# Patient Record
Sex: FEMALE | Race: BLACK OR AFRICAN AMERICAN | NOT HISPANIC OR LATINO | Employment: FULL TIME | URBAN - METROPOLITAN AREA
[De-identification: names, ages, dates, MRNs, and addresses within clinical notes are randomized per-mention and may not be internally consistent; named-entity substitution may affect disease eponyms.]

---

## 2017-02-12 DIAGNOSIS — I10 ESSENTIAL HYPERTENSION: ICD-10-CM

## 2017-02-13 RX ORDER — OLMESARTAN MEDOXOMIL-HYDROCHLOROTHIAZIDE 40; 12.5 MG/1; MG/1
TABLET, FILM COATED ORAL
Qty: 30 TABLET | Refills: 2 | Status: SHIPPED | OUTPATIENT
Start: 2017-02-13 | End: 2017-03-05

## 2017-03-03 ENCOUNTER — PATIENT MESSAGE (OUTPATIENT)
Dept: FAMILY MEDICINE | Facility: CLINIC | Age: 67
End: 2017-03-03

## 2017-03-03 DIAGNOSIS — F32.2 MAJOR DEPRESSIVE DISORDER, SINGLE EPISODE, SEVERE WITHOUT PSYCHOTIC FEATURES: ICD-10-CM

## 2017-03-03 DIAGNOSIS — I10 ESSENTIAL HYPERTENSION: ICD-10-CM

## 2017-03-03 DIAGNOSIS — R42 VERTIGO: ICD-10-CM

## 2017-03-03 DIAGNOSIS — E78.5 HYPERLIPIDEMIA, UNSPECIFIED HYPERLIPIDEMIA TYPE: Primary | ICD-10-CM

## 2017-03-03 DIAGNOSIS — I10 ESSENTIAL HYPERTENSION: Primary | ICD-10-CM

## 2017-03-05 RX ORDER — ENALAPRIL MALEATE AND HYDROCHLOROTHIAZIDE 5; 12.5 MG/1; MG/1
1 TABLET ORAL DAILY
Qty: 90 TABLET | Refills: 3 | Status: SHIPPED | OUTPATIENT
Start: 2017-03-05 | End: 2017-03-06 | Stop reason: SDUPTHER

## 2017-03-06 RX ORDER — ESCITALOPRAM OXALATE 10 MG/1
10 TABLET ORAL DAILY
Qty: 90 TABLET | Refills: 0 | Status: SHIPPED | OUTPATIENT
Start: 2017-03-06 | End: 2017-03-07 | Stop reason: SDUPTHER

## 2017-03-06 RX ORDER — METOPROLOL SUCCINATE 25 MG/1
25 TABLET, EXTENDED RELEASE ORAL DAILY
Qty: 90 TABLET | Refills: 0 | Status: SHIPPED | OUTPATIENT
Start: 2017-03-06 | End: 2017-03-07 | Stop reason: SDUPTHER

## 2017-03-06 RX ORDER — ENALAPRIL MALEATE AND HYDROCHLOROTHIAZIDE 5; 12.5 MG/1; MG/1
1 TABLET ORAL DAILY
Qty: 90 TABLET | Refills: 0 | Status: SHIPPED | OUTPATIENT
Start: 2017-03-06 | End: 2017-06-01 | Stop reason: SDUPTHER

## 2017-03-06 RX ORDER — SIMVASTATIN 20 MG/1
20 TABLET, FILM COATED ORAL NIGHTLY
Qty: 90 TABLET | Refills: 0 | Status: SHIPPED | OUTPATIENT
Start: 2017-03-06 | End: 2017-03-07 | Stop reason: SDUPTHER

## 2017-03-06 RX ORDER — MECLIZINE HYDROCHLORIDE 25 MG/1
25 TABLET ORAL 3 TIMES DAILY PRN
Qty: 30 TABLET | Refills: 0 | Status: SHIPPED | OUTPATIENT
Start: 2017-03-06 | End: 2021-03-15 | Stop reason: SDUPTHER

## 2017-03-06 RX ORDER — AMLODIPINE BESYLATE 5 MG/1
5 TABLET ORAL DAILY
Qty: 90 TABLET | Refills: 0 | Status: SHIPPED | OUTPATIENT
Start: 2017-03-06 | End: 2017-06-01 | Stop reason: SDUPTHER

## 2017-03-07 ENCOUNTER — PATIENT MESSAGE (OUTPATIENT)
Dept: FAMILY MEDICINE | Facility: CLINIC | Age: 67
End: 2017-03-07

## 2017-03-07 DIAGNOSIS — I10 ESSENTIAL HYPERTENSION: ICD-10-CM

## 2017-03-07 DIAGNOSIS — E78.5 HYPERLIPIDEMIA, UNSPECIFIED HYPERLIPIDEMIA TYPE: ICD-10-CM

## 2017-03-07 DIAGNOSIS — F32.2 MAJOR DEPRESSIVE DISORDER, SINGLE EPISODE, SEVERE WITHOUT PSYCHOTIC FEATURES: ICD-10-CM

## 2017-03-07 RX ORDER — METOPROLOL SUCCINATE 25 MG/1
25 TABLET, EXTENDED RELEASE ORAL DAILY
Qty: 90 TABLET | Refills: 3 | Status: SHIPPED | OUTPATIENT
Start: 2017-03-07 | End: 2018-06-01 | Stop reason: SDUPTHER

## 2017-03-07 RX ORDER — SIMVASTATIN 20 MG/1
20 TABLET, FILM COATED ORAL NIGHTLY
Qty: 90 TABLET | Refills: 3 | Status: SHIPPED | OUTPATIENT
Start: 2017-03-07 | End: 2018-06-01 | Stop reason: SDUPTHER

## 2017-03-07 RX ORDER — ESCITALOPRAM OXALATE 10 MG/1
10 TABLET ORAL DAILY
Qty: 90 TABLET | Refills: 3 | Status: SHIPPED | OUTPATIENT
Start: 2017-03-07 | End: 2018-06-01 | Stop reason: SDUPTHER

## 2017-03-16 ENCOUNTER — OFFICE VISIT (OUTPATIENT)
Dept: FAMILY MEDICINE | Facility: CLINIC | Age: 67
End: 2017-03-16
Payer: MEDICARE

## 2017-03-16 VITALS
HEART RATE: 59 BPM | HEIGHT: 62 IN | DIASTOLIC BLOOD PRESSURE: 70 MMHG | WEIGHT: 178.81 LBS | TEMPERATURE: 98 F | BODY MASS INDEX: 32.91 KG/M2 | OXYGEN SATURATION: 99 % | SYSTOLIC BLOOD PRESSURE: 124 MMHG

## 2017-03-16 DIAGNOSIS — Z00.00 ANNUAL PHYSICAL EXAM: Primary | ICD-10-CM

## 2017-03-16 DIAGNOSIS — I10 ESSENTIAL HYPERTENSION: ICD-10-CM

## 2017-03-16 DIAGNOSIS — Z12.31 ENCOUNTER FOR SCREENING MAMMOGRAM FOR BREAST CANCER: ICD-10-CM

## 2017-03-16 DIAGNOSIS — Z12.11 COLON CANCER SCREENING: ICD-10-CM

## 2017-03-16 DIAGNOSIS — E78.5 HYPERLIPIDEMIA, UNSPECIFIED HYPERLIPIDEMIA TYPE: ICD-10-CM

## 2017-03-16 PROCEDURE — 3074F SYST BP LT 130 MM HG: CPT | Mod: S$GLB,,, | Performed by: FAMILY MEDICINE

## 2017-03-16 PROCEDURE — 99499 UNLISTED E&M SERVICE: CPT | Mod: S$GLB,,, | Performed by: FAMILY MEDICINE

## 2017-03-16 PROCEDURE — 99397 PER PM REEVAL EST PAT 65+ YR: CPT | Mod: S$GLB,,, | Performed by: FAMILY MEDICINE

## 2017-03-16 PROCEDURE — 99999 PR PBB SHADOW E&M-EST. PATIENT-LVL III: CPT | Mod: PBBFAC,,, | Performed by: FAMILY MEDICINE

## 2017-03-16 PROCEDURE — 3078F DIAST BP <80 MM HG: CPT | Mod: S$GLB,,, | Performed by: FAMILY MEDICINE

## 2017-03-16 NOTE — MR AVS SNAPSHOT
[facility]  [address]  [place] [address]  Phone: [phone]  Fax: [phone]                  Rosa Isela Pate   3/16/2017 8:40 AM   Office Visit    Description:  Female : 1950   Provider:  Clarissa Hodgson MD   Department:  [facility]           Reason for Visit     Annual Exam     Headache           Diagnoses this Visit        Comments    Annual physical exam    -  Primary     Essential hypertension         Hyperlipidemia, unspecified hyperlipidemia type         Colon cancer screening         Encounter for screening mammogram for breast cancer                To Do List           Goals (5 Years of Data)     None      [facility]     [facility] Care [facility] -  Assistance  Registered nurses in the [facility] provide clinical advisement, health education, appointment booking, and other advisory services.  Call for this free service at [phone].             Medications           Message regarding Medications     Verify the changes and/or additions to your medication regime listed below are the same as discussed with your clinician today.  If any of these changes or additions are incorrect, please notify your healthcare provider.             Verify that the below list of medications is an accurate representation of the medications you are currently taking.  If none reported, the list may be blank. If incorrect, please contact your healthcare provider. Carry this list with you in case of emergency.           Current Medications     amlodipine (NORVASC) 5 MG tablet Take 1 tablet (5 mg total) by mouth once daily.    escitalopram oxalate (LEXAPRO) 10 MG tablet Take 1 tablet (10 mg total) by mouth once daily.    meclizine (ANTIVERT) 25 mg tablet Take 1 tablet (25 mg total) by mouth 3 (three) times daily as needed for Dizziness.    metoprolol succinate (TOPROL-XL) 25 MG 24 hr tablet Take 1 tablet (25 mg total) by mouth once daily.  "   multivitamin with minerals tablet     simvastatin (ZOCOR) 20 MG tablet Take 1 tablet (20 mg total) by mouth every evening.    enalapril-hydrochlorothiazide 5-12.5 mg per tablet Take 1 tablet by mouth once daily.           Clinical Reference Information           Your Vitals Were     BP Pulse Temp Height Weight Last Period    124/70 59 97.8 °F (36.6 °C) (Oral) 5' 2" (1.575 m) 81.1 kg (178 lb 12.7 oz) (LMP Unknown)    SpO2 BMI             99% 32.7 kg/m2         Blood Pressure          Most Recent Value    BP  124/70      Allergies as of 3/16/2017     No Known Allergies      Immunizations Administered on Date of Encounter - 3/16/2017     None      Orders Placed During Today's Visit      Normal Orders This Visit    Case request GI: COLONOSCOPY     Future Labs/Procedures Expected by Expires    CBC auto differential  3/16/2017 3/16/2018    Comprehensive metabolic panel  3/16/2017 3/16/2018    Lipid panel  3/16/2017 3/16/2018    Mammo Digital Screening Bilat with CAD  3/16/2017 5/16/2018    TSH  3/16/2017 3/16/2018      Smoking Cessation     If you would like to quit smoking:   You may be eligible for free services if you are a [place] resident and started smoking cigarettes before September 1, 1988.  Call the Smoking Cessation [employer] ([facility]) toll free at (066) 478-8188 or (538) 888-5972.   Call 5-800-QUIT-NOW if you do not meet the above criteria.            Language Assistance Services     ATTENTION: Language assistance services are available, free of charge. Please call [phone].      ATENCIÓN: Si habla nellyañol, tiene a stephen disposición servicios gratuitos de asistencia lingüística. Llame al [phone].     [facility] Ý: N?u b?n nói Ti?ng Vi?t, có các d?ch v? h? tr? ngôn ng? mi?n phí dành cho b?n. G?i s? [phone].         [facility] complies with applicable Federal civil rights laws and does not discriminate on the basis of race, color, national origin, age, disability, or sex.        "

## 2017-03-16 NOTE — PROGRESS NOTES
Office Visit    Patient Name: Rosa Isela Pate    : 1950  MRN: [id number]    Subjective:  Rosa Isela is a 66 y.o. female who presents today for:    Annual Exam and Headache      This patient has multiple medical diagnoses as noted below.  This patient is known to me and to this clinic.  Patient continues to have increased stress at work as well as at home.  We spent a significant amount of time talking about this increased stress.  We did address her health maintenance during this office evaluation.      Active Problem List  Patient Active Problem List   Diagnosis    Essential hypertension    Hyperlipidemia    Vasovagal syncope    HLD (hyperlipidemia)    Major depressive disorder, single episode, severe without psychotic features       Past Surgical History  Past Surgical History:   Procedure Laterality Date    CHOLECYSTECTOMY      removal    HYSTERECTOMY      MYOMECTOMY      removal    TUBAL LIGATION         Family History  Family History   Problem Relation Age of Onset    Hypertension Mother     Heart disease Father        Social History  Social History     Social History    Marital status:      Spouse name: N/A    Number of children: N/A    Years of education: N/A     Occupational History    Not on file.     Social History Main Topics    Smoking status: Current Every Day Smoker    Smokeless tobacco: Not on file    Alcohol use 0.0 oz/week     0 Standard drinks or equivalent per week    Drug use: No    Sexual activity: Yes     Partners: Male     Other Topics Concern    Not on file     Social History Narrative       Current Medications  Medications reviewed and updated.     Allergies   Review of patient's allergies indicates:  No Known Allergies    Review of Systems (Pertinent positives)  Review of Systems   Constitutional: Negative for activity change, appetite change, fatigue, fever and unexpected weight change.   HENT: Negative.  Negative for ear discharge, ear pain, rhinorrhea  "and sore throat.    Eyes: Negative.    Respiratory: Negative for apnea, cough, chest tightness, shortness of breath and wheezing.    Cardiovascular: Negative for chest pain, palpitations and leg swelling.   Gastrointestinal: Negative for abdominal distention, abdominal pain, constipation, diarrhea and vomiting.   Endocrine: Negative for cold intolerance, heat intolerance, polydipsia and polyuria.   Genitourinary: Negative for decreased urine volume, menstrual problem, urgency, vaginal bleeding, vaginal discharge and vaginal pain.   Musculoskeletal: Negative.    Skin: Negative for rash.   Neurological: Negative for dizziness and headaches.   Hematological: Does not bruise/bleed easily.   Psychiatric/Behavioral: Positive for dysphoric mood and sleep disturbance. Negative for agitation and suicidal ideas.       /70  Pulse (!) 59  Temp 97.8 °F (36.6 °C) (Oral)   Ht 5' 2" (1.575 m)  Wt 81.1 kg (178 lb 12.7 oz)  LMP  (LMP Unknown)  SpO2 99%  BMI 32.7 kg/m2    Physical Exam   Constitutional: She is oriented to person, place, and time. She appears well-developed and well-nourished.   HENT:   Head: Normocephalic and atraumatic.   Right Ear: External ear normal.   Left Ear: External ear normal.   Nose: Nose normal.   Mouth/Throat: Oropharynx is clear and moist.   Eyes: Conjunctivae and EOM are normal. Pupils are equal, round, and reactive to light.   Neck: Normal range of motion. No JVD present. No thyromegaly present.   Cardiovascular: Normal rate, regular rhythm and normal heart sounds.    Pulmonary/Chest: Effort normal and breath sounds normal. She has no wheezes.   Abdominal: Soft. Bowel sounds are normal. She exhibits no distension. There is no tenderness.   Musculoskeletal: Normal range of motion.   Lymphadenopathy:     She has no cervical adenopathy.   Neurological: She is alert and oriented to person, place, and time. She has normal reflexes.   Skin: Skin is warm and dry.   Psychiatric: Her behavior is " normal. Judgment and thought content normal. She exhibits a depressed mood.   Vitals reviewed.      Health Maintenance  Health Maintenance Topics with due status: Not Due       Topic Last Completion Date    Lipid Panel 03/17/2017       Assessment/Plan:  Rosa Isela Pate is a 66 y.o. female who presents today for :    Rosa Isela was seen today for annual exam and headache.    Diagnoses and all orders for this visit:    Annual physical exam  -     CBC auto differential; Future  -     Comprehensive metabolic panel; Future  -     Lipid panel; Future  -     TSH; Future    Essential hypertension  -     CBC auto differential; Future  -     Comprehensive metabolic panel; Future  -     Lipid panel; Future  -     TSH; Future  -  The patient is asked to make an attempt to improve diet and exercise patterns to aid in medical management of this problem.    Hyperlipidemia, unspecified hyperlipidemia type  -     CBC auto differential; Future  -     Comprehensive metabolic panel; Future  -     Lipid panel; Future  -     TSH; Future  -  Pt is currently stable on medication regimen.  Continue current therapy as scheduled.  Contact office with any questions about adjustments on medications.     Colon cancer screening  -     Case request GI: COLONOSCOPY    Encounter for screening mammogram for breast cancer  -     Mammo Digital Screening Bilat with CAD; Future      No Follow-up on file.

## 2017-03-17 ENCOUNTER — LAB VISIT (OUTPATIENT)
Dept: LAB | Facility: HOSPITAL | Age: 67
End: 2017-03-17
Attending: FAMILY MEDICINE
Payer: MEDICARE

## 2017-03-17 DIAGNOSIS — I10 ESSENTIAL HYPERTENSION: ICD-10-CM

## 2017-03-17 DIAGNOSIS — Z00.00 ANNUAL PHYSICAL EXAM: ICD-10-CM

## 2017-03-17 DIAGNOSIS — Z11.59 NEED FOR HEPATITIS C SCREENING TEST: ICD-10-CM

## 2017-03-17 DIAGNOSIS — E78.5 HYPERLIPIDEMIA, UNSPECIFIED HYPERLIPIDEMIA TYPE: ICD-10-CM

## 2017-03-17 LAB
ALBUMIN SERPL BCP-MCNC: 4 G/DL
ALP SERPL-CCNC: 56 U/L
ALT SERPL W/O P-5'-P-CCNC: 28 U/L
ANION GAP SERPL CALC-SCNC: 11 MMOL/L
AST SERPL-CCNC: 22 U/L
BASOPHILS # BLD AUTO: 0.02 K/UL
BASOPHILS NFR BLD: 0.4 %
BILIRUB SERPL-MCNC: 1.1 MG/DL
BUN SERPL-MCNC: 19 MG/DL
CALCIUM SERPL-MCNC: 9.7 MG/DL
CHLORIDE SERPL-SCNC: 103 MMOL/L
CHOLEST/HDLC SERPL: 3.5 {RATIO}
CO2 SERPL-SCNC: 28 MMOL/L
CREAT SERPL-MCNC: 0.8 MG/DL
DIFFERENTIAL METHOD: NORMAL
EOSINOPHIL # BLD AUTO: 0.1 K/UL
EOSINOPHIL NFR BLD: 2.3 %
ERYTHROCYTE [DISTWIDTH] IN BLOOD BY AUTOMATED COUNT: 13.9 %
EST. GFR  (AFRICAN AMERICAN): >60 ML/MIN/1.73 M^2
EST. GFR  (NON AFRICAN AMERICAN): >60 ML/MIN/1.73 M^2
GLUCOSE SERPL-MCNC: 106 MG/DL
HCT VFR BLD AUTO: 40.8 %
HDL/CHOLESTEROL RATIO: 28.4 %
HDLC SERPL-MCNC: 169 MG/DL
HDLC SERPL-MCNC: 48 MG/DL
HGB BLD-MCNC: 13.5 G/DL
LDLC SERPL CALC-MCNC: 101.8 MG/DL
LYMPHOCYTES # BLD AUTO: 2.5 K/UL
LYMPHOCYTES NFR BLD: 43.7 %
MCH RBC QN AUTO: 30.7 PG
MCHC RBC AUTO-ENTMCNC: 33.1 %
MCV RBC AUTO: 93 FL
MONOCYTES # BLD AUTO: 0.6 K/UL
MONOCYTES NFR BLD: 10.6 %
NEUTROPHILS # BLD AUTO: 2.4 K/UL
NEUTROPHILS NFR BLD: 42.8 %
NONHDLC SERPL-MCNC: 121 MG/DL
PLATELET # BLD AUTO: 254 K/UL
PMV BLD AUTO: 10.3 FL
POTASSIUM SERPL-SCNC: 3.6 MMOL/L
PROT SERPL-MCNC: 7.4 G/DL
RBC # BLD AUTO: 4.4 M/UL
SODIUM SERPL-SCNC: 142 MMOL/L
TRIGL SERPL-MCNC: 96 MG/DL
TSH SERPL DL<=0.005 MIU/L-ACNC: 1.25 UIU/ML
WBC # BLD AUTO: 5.68 K/UL

## 2017-03-17 PROCEDURE — 85025 COMPLETE CBC W/AUTO DIFF WBC: CPT

## 2017-03-17 PROCEDURE — 80061 LIPID PANEL: CPT

## 2017-03-17 PROCEDURE — 84443 ASSAY THYROID STIM HORMONE: CPT

## 2017-03-17 PROCEDURE — 86803 HEPATITIS C AB TEST: CPT

## 2017-03-17 PROCEDURE — 80053 COMPREHEN METABOLIC PANEL: CPT

## 2017-03-17 PROCEDURE — 36415 COLL VENOUS BLD VENIPUNCTURE: CPT | Mod: PO

## 2017-03-20 LAB — HCV AB SERPL QL IA: NEGATIVE

## 2017-05-31 ENCOUNTER — PATIENT MESSAGE (OUTPATIENT)
Dept: FAMILY MEDICINE | Facility: CLINIC | Age: 67
End: 2017-05-31

## 2017-05-31 DIAGNOSIS — F32.2 MAJOR DEPRESSIVE DISORDER, SINGLE EPISODE, SEVERE WITHOUT PSYCHOTIC FEATURES: ICD-10-CM

## 2017-05-31 DIAGNOSIS — I10 ESSENTIAL HYPERTENSION: ICD-10-CM

## 2017-06-01 RX ORDER — ENALAPRIL MALEATE AND HYDROCHLOROTHIAZIDE 5; 12.5 MG/1; MG/1
1 TABLET ORAL DAILY
Qty: 90 TABLET | Refills: 0 | Status: SHIPPED | OUTPATIENT
Start: 2017-06-01 | End: 2017-10-11 | Stop reason: SDUPTHER

## 2017-06-01 RX ORDER — AMLODIPINE BESYLATE 5 MG/1
5 TABLET ORAL DAILY
Qty: 90 TABLET | Refills: 0 | Status: SHIPPED | OUTPATIENT
Start: 2017-06-01 | End: 2017-10-11 | Stop reason: SDUPTHER

## 2017-10-02 ENCOUNTER — PATIENT MESSAGE (OUTPATIENT)
Dept: FAMILY MEDICINE | Facility: CLINIC | Age: 67
End: 2017-10-02

## 2017-10-02 DIAGNOSIS — Z12.11 COLON CANCER SCREENING: Primary | ICD-10-CM

## 2017-10-11 DIAGNOSIS — I10 ESSENTIAL HYPERTENSION: ICD-10-CM

## 2017-10-11 DIAGNOSIS — F32.2 MAJOR DEPRESSIVE DISORDER, SINGLE EPISODE, SEVERE WITHOUT PSYCHOTIC FEATURES: ICD-10-CM

## 2017-10-12 RX ORDER — AMLODIPINE BESYLATE 5 MG/1
TABLET ORAL
Qty: 90 TABLET | Refills: 0 | Status: SHIPPED | OUTPATIENT
Start: 2017-10-12 | End: 2018-06-01 | Stop reason: SDUPTHER

## 2017-10-12 RX ORDER — ENALAPRIL MALEATE AND HYDROCHLOROTHIAZIDE 5; 12.5 MG/1; MG/1
TABLET ORAL
Qty: 90 TABLET | Refills: 0 | Status: SHIPPED | OUTPATIENT
Start: 2017-10-12 | End: 2018-06-01 | Stop reason: SDUPTHER

## 2017-10-26 DIAGNOSIS — Z12.11 SCREEN FOR COLON CANCER: Primary | ICD-10-CM

## 2017-10-31 ENCOUNTER — ANESTHESIA EVENT (OUTPATIENT)
Dept: ENDOSCOPY | Facility: HOSPITAL | Age: 67
End: 2017-10-31
Payer: MEDICARE

## 2017-10-31 RX ORDER — LIDOCAINE HYDROCHLORIDE 10 MG/ML
1 INJECTION, SOLUTION EPIDURAL; INFILTRATION; INTRACAUDAL; PERINEURAL ONCE
Status: CANCELLED | OUTPATIENT
Start: 2017-10-31 | End: 2017-10-31

## 2017-11-01 ENCOUNTER — ANESTHESIA (OUTPATIENT)
Dept: ENDOSCOPY | Facility: HOSPITAL | Age: 67
End: 2017-11-01
Payer: MEDICARE

## 2017-11-01 ENCOUNTER — HOSPITAL ENCOUNTER (OUTPATIENT)
Facility: HOSPITAL | Age: 67
Discharge: HOME | End: 2017-11-01
Attending: INTERNAL MEDICINE | Admitting: INTERNAL MEDICINE
Payer: MEDICARE

## 2017-11-01 VITALS
BODY MASS INDEX: 32.2 KG/M2 | RESPIRATION RATE: 18 BRPM | HEIGHT: 62 IN | WEIGHT: 175 LBS | DIASTOLIC BLOOD PRESSURE: 82 MMHG | TEMPERATURE: 98 F | SYSTOLIC BLOOD PRESSURE: 159 MMHG | HEART RATE: 62 BPM | OXYGEN SATURATION: 96 %

## 2017-11-01 DIAGNOSIS — Z12.11 COLON CANCER SCREENING: ICD-10-CM

## 2017-11-01 PROCEDURE — 88305 TISSUE EXAM BY PATHOLOGIST: CPT | Performed by: PATHOLOGY

## 2017-11-01 PROCEDURE — 63600175 PHARM REV CODE 636 W HCPCS: Performed by: NURSE ANESTHETIST, CERTIFIED REGISTERED

## 2017-11-01 PROCEDURE — D9220A PRA ANESTHESIA: Mod: PT,ANES,, | Performed by: ANESTHESIOLOGY

## 2017-11-01 PROCEDURE — 27201089 HC SNARE, DISP (ANY): Performed by: INTERNAL MEDICINE

## 2017-11-01 PROCEDURE — 37000009 HC ANESTHESIA EA ADD 15 MINS: Performed by: INTERNAL MEDICINE

## 2017-11-01 PROCEDURE — D9220A PRA ANESTHESIA: Mod: PT,CRNA,, | Performed by: NURSE ANESTHETIST, CERTIFIED REGISTERED

## 2017-11-01 PROCEDURE — 88305 TISSUE EXAM BY PATHOLOGIST: CPT | Mod: 26,,, | Performed by: PATHOLOGY

## 2017-11-01 PROCEDURE — 25000003 PHARM REV CODE 250: Performed by: ANESTHESIOLOGY

## 2017-11-01 PROCEDURE — 45385 COLONOSCOPY W/LESION REMOVAL: CPT | Performed by: INTERNAL MEDICINE

## 2017-11-01 PROCEDURE — 37000008 HC ANESTHESIA 1ST 15 MINUTES: Performed by: INTERNAL MEDICINE

## 2017-11-01 RX ORDER — PROPOFOL 10 MG/ML
VIAL (ML) INTRAVENOUS
Status: DISCONTINUED | OUTPATIENT
Start: 2017-11-01 | End: 2017-11-01

## 2017-11-01 RX ORDER — PROPOFOL 10 MG/ML
VIAL (ML) INTRAVENOUS
Status: COMPLETED
Start: 2017-11-01 | End: 2017-11-01

## 2017-11-01 RX ORDER — LIDOCAINE HCL/PF 100 MG/5ML
SYRINGE (ML) INTRAVENOUS
Status: DISCONTINUED | OUTPATIENT
Start: 2017-11-01 | End: 2017-11-01

## 2017-11-01 RX ORDER — LIDOCAINE HYDROCHLORIDE 20 MG/ML
INJECTION, SOLUTION EPIDURAL; INFILTRATION; INTRACAUDAL; PERINEURAL
Status: DISCONTINUED
Start: 2017-11-01 | End: 2017-11-01 | Stop reason: HOSPADM

## 2017-11-01 RX ORDER — SODIUM CHLORIDE 9 MG/ML
INJECTION, SOLUTION INTRAVENOUS CONTINUOUS
Status: DISCONTINUED | OUTPATIENT
Start: 2017-11-01 | End: 2017-11-01 | Stop reason: HOSPADM

## 2017-11-01 RX ADMIN — SODIUM CHLORIDE: 0.9 INJECTION, SOLUTION INTRAVENOUS at 07:11

## 2017-11-01 RX ADMIN — PROPOFOL 50 MG: 10 INJECTION, EMULSION INTRAVENOUS at 08:11

## 2017-11-01 RX ADMIN — LIDOCAINE HYDROCHLORIDE 100 MG: 20 INJECTION, SOLUTION INTRAVENOUS at 08:11

## 2017-11-01 RX ADMIN — PROPOFOL 150 MG: 10 INJECTION, EMULSION INTRAVENOUS at 08:11

## 2017-11-01 NOTE — ANESTHESIA POSTPROCEDURE EVALUATION
"Anesthesia Post Evaluation    Patient: Rosa Isela Pate    Procedure(s) Performed: Procedure(s) (LRB):  COLONOSCOPY (N/A)    Final Anesthesia Type: general  Patient location during evaluation: PACU  Patient participation: Yes- Able to Participate  Level of consciousness: awake and alert, oriented and awake  Post-procedure vital signs: reviewed and stable  Pain management: adequate  Airway patency: patent  PONV status at discharge: No PONV  Anesthetic complications: no      Cardiovascular status: blood pressure returned to baseline  Respiratory status: unassisted and spontaneous ventilation  Hydration status: euvolemic  Follow-up not needed.        Visit Vitals  BP (!) 159/82 (BP Location: Left arm, Patient Position: Lying)   Pulse 62   Temp 36.8 °C (98.2 °F) (Oral)   Resp 18   Ht 5' 2" (1.575 m)   Wt 79.4 kg (175 lb)   LMP  (LMP Unknown)   SpO2 96%   Breastfeeding? No   BMI 32.01 kg/m²       Pain/Leon Score: Pain Assessment Performed: Yes (11/1/2017  9:00 AM)  Presence of Pain: denies (11/1/2017  9:00 AM)  Leon Score: 10 (11/1/2017  8:45 AM)      "

## 2017-11-01 NOTE — ANESTHESIA PREPROCEDURE EVALUATION
11/01/2017  Rosa Isela Pate is a 67 y.o., female.    Anesthesia Evaluation    I have reviewed the Patient Summary Reports.     I have reviewed the Medications.     Review of Systems  Anesthesia Hx:  No problems with previous Anesthesia   Denies Personal Hx of Anesthesia complications.   Hematology/Oncology:  Hematology Normal   Oncology Normal     EENT/Dental:EENT/Dental Normal   Cardiovascular:   Hypertension    Pulmonary:  Pulmonary Normal    Renal/:  Renal/ Normal     Hepatic/GI:  Hepatic/GI Normal    Musculoskeletal:  Musculoskeletal Normal    Neurological:  Neurology Normal    Endocrine:  Endocrine Normal    Dermatological:  Skin Normal    Psych:   Psychiatric History          Physical Exam  General:  Well nourished    Airway/Jaw/Neck:  Airway Findings: Mouth Opening: Normal Tongue: Normal  Mallampati: II      Dental:  Dental Findings: In tact   Chest/Lungs:  Chest/Lungs Clear    Heart/Vascular:  Heart Findings: Normal Heart murmur: negative       Mental Status:  Mental Status Findings:  Cooperative, Alert and Oriented         Anesthesia Plan  Type of Anesthesia, risks & benefits discussed:  Anesthesia Type:  general, MAC, regional  Patient's Preference:   Intra-op Monitoring Plan: standard ASA monitors  Intra-op Monitoring Plan Comments:   Post Op Pain Control Plan: multimodal analgesia  Post Op Pain Control Plan Comments:   Induction:   IV  Beta Blocker:  Patient is on a Beta-Blocker and has received one dose within the past 24 hours (No further documentation required).       Informed Consent: Patient understands risks and agrees with Anesthesia plan.  Questions answered. Anesthesia consent signed with patient.  ASA Score: 2     Day of Surgery Review of History & Physical:            Ready For Surgery From Anesthesia Perspective.

## 2017-11-01 NOTE — TRANSFER OF CARE
"Anesthesia Transfer of Care Note    Patient: Rosa Isela Pate    Procedure(s) Performed: Procedure(s) (LRB):  COLONOSCOPY (N/A)    Patient location: PACU    Anesthesia Type: general    Transport from OR: Transported from OR on room air with adequate spontaneous ventilation    Post pain: adequate analgesia    Post assessment: no apparent anesthetic complications and tolerated procedure well    Post vital signs: stable    Level of consciousness: alert, oriented and awake    Nausea/Vomiting: no nausea/vomiting    Complications: none    Transfer of care protocol was followed      Last vitals:   Visit Vitals  BP (!) 150/96   Pulse 86   Temp 36.4 °C (97.5 °F) (Oral)   Resp 14   Ht 5' 2" (1.575 m)   Wt 79.4 kg (175 lb)   LMP  (LMP Unknown)   SpO2 96%   Breastfeeding? No   BMI 32.01 kg/m²     "

## 2017-11-01 NOTE — PRE-PROCEDURE INSTRUCTIONS
Pre-Procedure H&P:  Reason for Procedure: screen    HPI:  Pt is a 67 y.o. female screen    Past Medical History:   Diagnosis Date    Hyperlipidemia     Hypertension        Past Surgical History:   Procedure Laterality Date    CHOLECYSTECTOMY      removal    HYSTERECTOMY      MYOMECTOMY      removal    TUBAL LIGATION         Family History   Problem Relation Age of Onset    Hypertension Mother     Heart disease Father        Social History     Social History    Marital status:      Spouse name: N/A    Number of children: N/A    Years of education: N/A     Occupational History    Not on file.     Social History Main Topics    Smoking status: Current Every Day Smoker    Smokeless tobacco: Never Used    Alcohol use 0.0 oz/week    Drug use: No    Sexual activity: Yes     Partners: Male     Other Topics Concern    Not on file     Social History Narrative    No narrative on file       Endoscopic History:      Review of patient's allergies indicates:  No Known Allergies    No current facility-administered medications on file prior to encounter.      Current Outpatient Prescriptions on File Prior to Encounter   Medication Sig Dispense Refill    escitalopram oxalate (LEXAPRO) 10 MG tablet Take 1 tablet (10 mg total) by mouth once daily. 90 tablet 3    metoprolol succinate (TOPROL-XL) 25 MG 24 hr tablet Take 1 tablet (25 mg total) by mouth once daily. 90 tablet 3    multivitamin with minerals tablet       simvastatin (ZOCOR) 20 MG tablet Take 1 tablet (20 mg total) by mouth every evening. 90 tablet 3    meclizine (ANTIVERT) 25 mg tablet Take 1 tablet (25 mg total) by mouth 3 (three) times daily as needed for Dizziness. 30 tablet 0       Current Facility-Administered Medications:     0.9%  NaCl infusion, , Intravenous, Continuous, Juan Diego Lentz MD, Last Rate: 10 mL/hr at 11/01/17 0757    lidocaine  20 mg/mL (2%) 20 mg/mL (2 %) injection, , , ,     propofol (DIPRIVAN) 10 mg/mL IVP  "injection, , , ,     ROS: Negative x 10    Patient Vitals for the past 24 hrs:   BP Temp Temp src Pulse Resp SpO2 Height Weight   11/01/17 0750 (!) 154/80 98.7 °F (37.1 °C) Oral 68 18 98 % 5' 2" (1.575 m) 79.4 kg (175 lb)       Gen: Well developed, well nourished, no apparent distress  HEENT: Anicteric, PERRLA  CV: S1, S2, no murmers/rubs, non-displaced PMI  Lungs: CTA-B, normal excursion  Abd: Soft, NT, ND, normal BS's, no HSM  Ext: No c/c/e, 1+ DP pulses to BLE's  Neuro: CN II-XII grossly intact, no asterixis.  Skin: No rashes/lesions.  Psych: AA&O x 4    Assessment:  Pt. Is a 67 y.o. female with:  1. screen    Recommendations:  1. Colonoscopy  2. F/U with PCP      I would like to take this opportunity to thank you for this consult.  If you have any questions or concerns, please do not hesitate to contact me.       "

## 2018-03-06 ENCOUNTER — PES CALL (OUTPATIENT)
Dept: ADMINISTRATIVE | Facility: CLINIC | Age: 68
End: 2018-03-06

## 2018-04-18 ENCOUNTER — OFFICE VISIT (OUTPATIENT)
Dept: FAMILY MEDICINE | Facility: CLINIC | Age: 68
End: 2018-04-18
Payer: MEDICARE

## 2018-04-18 VITALS
WEIGHT: 171.75 LBS | TEMPERATURE: 99 F | SYSTOLIC BLOOD PRESSURE: 110 MMHG | OXYGEN SATURATION: 97 % | DIASTOLIC BLOOD PRESSURE: 80 MMHG | HEART RATE: 67 BPM | HEIGHT: 62 IN | BODY MASS INDEX: 31.6 KG/M2

## 2018-04-18 DIAGNOSIS — F32.2 MAJOR DEPRESSIVE DISORDER, SINGLE EPISODE, SEVERE WITHOUT PSYCHOTIC FEATURES: ICD-10-CM

## 2018-04-18 DIAGNOSIS — E78.00 PURE HYPERCHOLESTEROLEMIA: ICD-10-CM

## 2018-04-18 DIAGNOSIS — Z12.31 ENCOUNTER FOR SCREENING MAMMOGRAM FOR BREAST CANCER: ICD-10-CM

## 2018-04-18 DIAGNOSIS — Z00.00 ANNUAL PHYSICAL EXAM: Primary | ICD-10-CM

## 2018-04-18 DIAGNOSIS — I10 ESSENTIAL HYPERTENSION: ICD-10-CM

## 2018-04-18 PROCEDURE — 99499 UNLISTED E&M SERVICE: CPT | Mod: S$GLB,,, | Performed by: FAMILY MEDICINE

## 2018-04-18 PROCEDURE — 99999 PR PBB SHADOW E&M-EST. PATIENT-LVL III: CPT | Mod: PBBFAC,,, | Performed by: FAMILY MEDICINE

## 2018-04-18 PROCEDURE — 3079F DIAST BP 80-89 MM HG: CPT | Mod: CPTII,S$GLB,, | Performed by: FAMILY MEDICINE

## 2018-04-18 PROCEDURE — 99397 PER PM REEVAL EST PAT 65+ YR: CPT | Mod: S$GLB,,, | Performed by: FAMILY MEDICINE

## 2018-04-18 PROCEDURE — 3074F SYST BP LT 130 MM HG: CPT | Mod: CPTII,S$GLB,, | Performed by: FAMILY MEDICINE

## 2018-04-18 NOTE — PROGRESS NOTES
Office Visit    Patient Name: Rosa Isela Pate    : 1950  MRN: [id number]    Subjective:  Rosa Isela is a 67 y.o. female who presents today for:    Annual Exam      This patient has multiple medical diagnoses as noted below.  This patient is known to me and to this clinic. She recently changes jobs.  She has noted lightheadedness, but this was secondary to stress. She did have issues with diarrhea and cough.  Her cough has improved.  She is sleeping better, but the cough can at times keep her from sleeping.       Patient Active Problem List   Diagnosis    Essential hypertension    Hyperlipidemia    Vasovagal syncope    Major depressive disorder, single episode, severe without psychotic features    Colon cancer screening       Past Surgical History:   Procedure Laterality Date    CHOLECYSTECTOMY      removal    COLONOSCOPY N/A 2017    Procedure: COLONOSCOPY;  Surgeon: Francisco Javier Bai MD;  Location: [facility];  Service: Endoscopy;  Laterality: N/A;    HYSTERECTOMY      MYOMECTOMY      removal    TUBAL LIGATION         Family History   Problem Relation Age of Onset    Hypertension Mother     Heart disease Father        Social History     Social History    Marital status:      Spouse name: N/A    Number of children: N/A    Years of education: N/A     Occupational History    Not on file.     Social History Main Topics    Smoking status: Current Every Day Smoker    Smokeless tobacco: Never Used    Alcohol use 0.0 oz/week    Drug use: No    Sexual activity: Yes     Partners: Male     Other Topics Concern    Not on file     Social History Narrative    No narrative on file       Current Medications  Medications reviewed and updated.     Allergies   Review of patient's allergies indicates:  No Known Allergies      Labs  No results found for: LABA1C, HGBA1C  Lab Results   Component Value Date     2017    K 3.6 2017     2017    CO2 28 2017    BUN 19  "03/17/2017    CREATININE 0.8 03/17/2017    CALCIUM 9.7 03/17/2017    ANIONGAP 11 03/17/2017    ESTGFRAFRICA >60.0 03/17/2017    EGFRNONAA >60.0 03/17/2017     Lab Results   Component Value Date    CHOL 169 03/17/2017     Lab Results   Component Value Date    HDL 48 03/17/2017     Lab Results   Component Value Date    LDLCALC 101.8 03/17/2017     Lab Results   Component Value Date    TRIG 96 03/17/2017     Lab Results   Component Value Date    CHOLHDL 28.4 03/17/2017     Last set of blood work has been reviewed as noted above.    Review of Systems   Constitutional: Negative for activity change, appetite change, fatigue, fever and unexpected weight change.   HENT: Negative.  Negative for ear discharge, ear pain, rhinorrhea and sore throat.    Eyes: Negative.    Respiratory: Negative for apnea, cough, chest tightness, shortness of breath and wheezing.    Cardiovascular: Negative for chest pain, palpitations and leg swelling.   Gastrointestinal: Positive for diarrhea. Negative for abdominal distention, abdominal pain, constipation and vomiting.   Endocrine: Negative for cold intolerance, heat intolerance, polydipsia and polyuria.   Genitourinary: Negative for decreased urine volume, menstrual problem, urgency, vaginal bleeding, vaginal discharge and vaginal pain.   Musculoskeletal: Negative.    Skin: Negative for rash.   Neurological: Positive for light-headedness. Negative for dizziness and headaches.   Hematological: Does not bruise/bleed easily.   Psychiatric/Behavioral: Negative for agitation, sleep disturbance and suicidal ideas.       /80 (BP Location: Left arm, Patient Position: Sitting, BP Method: Large (Manual))   Pulse 67   Temp 98.6 °F (37 °C) (Oral)   Ht 5' 2" (1.575 m)   Wt 77.9 kg (171 lb 11.8 oz)   LMP  (LMP Unknown)   SpO2 97%   BMI 31.41 kg/m²      Physical Exam   Constitutional: She is oriented to person, place, and time. She appears well-developed and well-nourished.   HENT:   Head: " Normocephalic and atraumatic.   Right Ear: External ear normal.   Left Ear: External ear normal.   Nose: Nose normal.   Mouth/Throat: Oropharynx is clear and moist.   Eyes: Conjunctivae and EOM are normal. Pupils are equal, round, and reactive to light.   Neck: Normal range of motion. No JVD present. No thyromegaly present.   Cardiovascular: Normal rate, regular rhythm and normal heart sounds.    Pulmonary/Chest: Effort normal and breath sounds normal. She has no wheezes.   Abdominal: Soft. Bowel sounds are normal. She exhibits no distension. There is no tenderness.   Musculoskeletal: Normal range of motion.   Lymphadenopathy:     She has no cervical adenopathy.   Neurological: She is alert and oriented to person, place, and time. She has normal reflexes.   Skin: Skin is warm and dry.   Psychiatric: She has a normal mood and affect. Her behavior is normal. Judgment and thought content normal.   Vitals reviewed.      Health Maintenance  Health Maintenance       Date Due Completion Date    TETANUS VACCINE 07/12/1968 ---    Mammogram 07/12/1990 ---    DEXA SCAN 07/12/1990 ---    Zoster Vaccine 07/12/2010 ---    Pneumococcal (65+) (1 of 2 - PCV13) 07/12/2015 ---    Influenza Vaccine 08/01/2017 ---    Lipid Panel 03/17/2022 3/17/2017    Colonoscopy 11/01/2027 11/1/2017          Assessment/Plan:  Rosa Isela Pate is a 67 y.o. female who presents today for :    1. Annual physical exam    2. Encounter for screening mammogram for breast cancer    3. Essential hypertension    4. Pure hypercholesterolemia    5. Major depressive disorder, single episode, severe without psychotic features        Problem List Items Addressed This Visit        Unprioritized    Essential hypertension    Current Assessment & Plan     Pt is currently stable on medication regimen.  Continue current therapy as scheduled.  Contact office with any questions about adjustments on medications.            Relevant Orders    CBC auto differential     Comprehensive metabolic panel    Lipid panel    TSH    Hyperlipidemia    Current Assessment & Plan     Patient is stable.  Assess and addressed all modifiable risk factors.  Continue with appropriate management to prevent complications.           Relevant Orders    CBC auto differential    Comprehensive metabolic panel    Lipid panel    TSH    Major depressive disorder, single episode, severe without psychotic features    Current Assessment & Plan     No new symptoms.  Improved since last evaluation           Other Visit Diagnoses     Annual physical exam    -  Primary    Relevant Orders    CBC auto differential    Comprehensive metabolic panel    Lipid panel    TSH    Mammo Digital Screening Bilat with CAD    Encounter for screening mammogram for breast cancer        Relevant Orders    Mammo Digital Screening Bilat with CAD        I addressed all major concerns as it related to health maintenance.  All were ordered and scheduled based on the patients wishes.  Any additional health maintenance will be readdressed at the next physical if declined or deferred by the patient.    Follow-up in about 1 year (around 4/18/2019).     This note was created by combination of typed  and Dragon dictation.  Transcription errors may be present.  If there are any questions, please contact me.

## 2018-04-19 ENCOUNTER — HOSPITAL ENCOUNTER (OUTPATIENT)
Dept: RADIOLOGY | Facility: HOSPITAL | Age: 68
Discharge: HOME | End: 2018-04-19
Attending: FAMILY MEDICINE
Payer: MEDICARE

## 2018-04-19 DIAGNOSIS — Z12.31 ENCOUNTER FOR SCREENING MAMMOGRAM FOR BREAST CANCER: ICD-10-CM

## 2018-04-19 PROCEDURE — 77067 SCR MAMMO BI INCL CAD: CPT | Mod: TC,PO

## 2018-04-19 PROCEDURE — 77067 SCR MAMMO BI INCL CAD: CPT | Mod: 26,,, | Performed by: RADIOLOGY

## 2018-04-19 PROCEDURE — 77063 BREAST TOMOSYNTHESIS BI: CPT | Mod: 26,,, | Performed by: RADIOLOGY

## 2018-06-01 DIAGNOSIS — I10 ESSENTIAL HYPERTENSION: ICD-10-CM

## 2018-06-01 DIAGNOSIS — E78.5 HYPERLIPIDEMIA, UNSPECIFIED HYPERLIPIDEMIA TYPE: ICD-10-CM

## 2018-06-01 DIAGNOSIS — F32.2 MAJOR DEPRESSIVE DISORDER, SINGLE EPISODE, SEVERE WITHOUT PSYCHOTIC FEATURES: ICD-10-CM

## 2018-06-01 RX ORDER — METOPROLOL SUCCINATE 25 MG/1
25 TABLET, EXTENDED RELEASE ORAL DAILY
Qty: 90 TABLET | Refills: 0 | Status: SHIPPED | OUTPATIENT
Start: 2018-06-01 | End: 2018-10-02 | Stop reason: SDUPTHER

## 2018-06-01 RX ORDER — AMLODIPINE BESYLATE 5 MG/1
5 TABLET ORAL DAILY
Qty: 90 TABLET | Refills: 0 | Status: SHIPPED | OUTPATIENT
Start: 2018-06-01 | End: 2018-10-02 | Stop reason: SDUPTHER

## 2018-06-01 RX ORDER — SIMVASTATIN 20 MG/1
20 TABLET, FILM COATED ORAL NIGHTLY
Qty: 90 TABLET | Refills: 0 | Status: SHIPPED | OUTPATIENT
Start: 2018-06-01 | End: 2018-10-02 | Stop reason: SDUPTHER

## 2018-06-01 RX ORDER — ENALAPRIL MALEATE AND HYDROCHLOROTHIAZIDE 5; 12.5 MG/1; MG/1
TABLET ORAL
Qty: 90 TABLET | Refills: 0 | Status: SHIPPED | OUTPATIENT
Start: 2018-06-01 | End: 2018-09-12 | Stop reason: SINTOL

## 2018-06-01 RX ORDER — ESCITALOPRAM OXALATE 10 MG/1
10 TABLET ORAL DAILY
Qty: 90 TABLET | Refills: 0 | Status: SHIPPED | OUTPATIENT
Start: 2018-06-01 | End: 2018-10-02 | Stop reason: SDUPTHER

## 2018-07-02 ENCOUNTER — PES CALL (OUTPATIENT)
Dept: ADMINISTRATIVE | Facility: CLINIC | Age: 68
End: 2018-07-02

## 2018-09-11 ENCOUNTER — PATIENT MESSAGE (OUTPATIENT)
Dept: FAMILY MEDICINE | Facility: CLINIC | Age: 68
End: 2018-09-11

## 2018-09-12 ENCOUNTER — OFFICE VISIT (OUTPATIENT)
Dept: FAMILY MEDICINE | Facility: CLINIC | Age: 68
End: 2018-09-12
Payer: MEDICARE

## 2018-09-12 ENCOUNTER — LAB VISIT (OUTPATIENT)
Dept: LAB | Facility: HOSPITAL | Age: 68
End: 2018-09-12
Payer: MEDICARE

## 2018-09-12 VITALS
BODY MASS INDEX: 31.3 KG/M2 | HEART RATE: 58 BPM | OXYGEN SATURATION: 97 % | TEMPERATURE: 98 F | SYSTOLIC BLOOD PRESSURE: 140 MMHG | WEIGHT: 170.06 LBS | HEIGHT: 62 IN | DIASTOLIC BLOOD PRESSURE: 82 MMHG

## 2018-09-12 DIAGNOSIS — Z00.00 ROUTINE MEDICAL EXAM: ICD-10-CM

## 2018-09-12 DIAGNOSIS — F41.9 ANXIETY DISORDER: ICD-10-CM

## 2018-09-12 DIAGNOSIS — R79.9 ABNORMAL FINDING OF BLOOD CHEMISTRY: ICD-10-CM

## 2018-09-12 DIAGNOSIS — I10 ESSENTIAL HYPERTENSION: Primary | ICD-10-CM

## 2018-09-12 DIAGNOSIS — M54.9 BACK PAIN, UNSPECIFIED BACK LOCATION, UNSPECIFIED BACK PAIN LATERALITY, UNSPECIFIED CHRONICITY: ICD-10-CM

## 2018-09-12 DIAGNOSIS — R05.8 ACE-INHIBITOR COUGH: ICD-10-CM

## 2018-09-12 DIAGNOSIS — T46.4X5A ACE-INHIBITOR COUGH: ICD-10-CM

## 2018-09-12 LAB
ALBUMIN SERPL BCP-MCNC: 4 G/DL
ALP SERPL-CCNC: 60 U/L
ALT SERPL W/O P-5'-P-CCNC: 28 U/L
ANION GAP SERPL CALC-SCNC: 10 MMOL/L
AST SERPL-CCNC: 26 U/L
BASOPHILS # BLD AUTO: 0.05 K/UL
BASOPHILS NFR BLD: 0.8 %
BILIRUB SERPL-MCNC: 0.9 MG/DL
BUN SERPL-MCNC: 20 MG/DL
CALCIUM SERPL-MCNC: 9.8 MG/DL
CHLORIDE SERPL-SCNC: 102 MMOL/L
CHOLEST SERPL-MCNC: 135 MG/DL
CHOLEST/HDLC SERPL: 2.8 {RATIO}
CO2 SERPL-SCNC: 27 MMOL/L
CREAT SERPL-MCNC: 0.8 MG/DL
DIFFERENTIAL METHOD: ABNORMAL
EOSINOPHIL # BLD AUTO: 0.2 K/UL
EOSINOPHIL NFR BLD: 2.4 %
ERYTHROCYTE [DISTWIDTH] IN BLOOD BY AUTOMATED COUNT: 13.7 %
EST. GFR  (AFRICAN AMERICAN): >60 ML/MIN/1.73 M^2
EST. GFR  (NON AFRICAN AMERICAN): >60 ML/MIN/1.73 M^2
GLUCOSE SERPL-MCNC: 94 MG/DL
HCT VFR BLD AUTO: 39.9 %
HDLC SERPL-MCNC: 49 MG/DL
HDLC SERPL: 36.3 %
HGB BLD-MCNC: 12.3 G/DL
IMM GRANULOCYTES # BLD AUTO: 0.03 K/UL
IMM GRANULOCYTES NFR BLD AUTO: 0.5 %
LDLC SERPL CALC-MCNC: 72 MG/DL
LYMPHOCYTES # BLD AUTO: 2.7 K/UL
LYMPHOCYTES NFR BLD: 40.5 %
MCH RBC QN AUTO: 29.9 PG
MCHC RBC AUTO-ENTMCNC: 30.8 G/DL
MCV RBC AUTO: 97 FL
MONOCYTES # BLD AUTO: 0.6 K/UL
MONOCYTES NFR BLD: 9 %
NEUTROPHILS # BLD AUTO: 3.1 K/UL
NEUTROPHILS NFR BLD: 46.8 %
NONHDLC SERPL-MCNC: 86 MG/DL
NRBC BLD-RTO: 0 /100 WBC
PLATELET # BLD AUTO: 257 K/UL
PMV BLD AUTO: 10.2 FL
POTASSIUM SERPL-SCNC: 3.7 MMOL/L
PROT SERPL-MCNC: 7.7 G/DL
RBC # BLD AUTO: 4.11 M/UL
SODIUM SERPL-SCNC: 139 MMOL/L
TRIGL SERPL-MCNC: 70 MG/DL
TSH SERPL DL<=0.005 MIU/L-ACNC: 1.38 UIU/ML
WBC # BLD AUTO: 6.54 K/UL

## 2018-09-12 PROCEDURE — 3077F SYST BP >= 140 MM HG: CPT | Mod: CPTII,,, | Performed by: NURSE PRACTITIONER

## 2018-09-12 PROCEDURE — 3079F DIAST BP 80-89 MM HG: CPT | Mod: CPTII,,, | Performed by: NURSE PRACTITIONER

## 2018-09-12 PROCEDURE — 1101F PT FALLS ASSESS-DOCD LE1/YR: CPT | Mod: CPTII,,, | Performed by: NURSE PRACTITIONER

## 2018-09-12 PROCEDURE — 99213 OFFICE O/P EST LOW 20 MIN: CPT | Mod: PBBFAC,PO | Performed by: NURSE PRACTITIONER

## 2018-09-12 PROCEDURE — 80061 LIPID PANEL: CPT

## 2018-09-12 PROCEDURE — 84443 ASSAY THYROID STIM HORMONE: CPT

## 2018-09-12 PROCEDURE — 36415 COLL VENOUS BLD VENIPUNCTURE: CPT | Mod: PO

## 2018-09-12 PROCEDURE — 80053 COMPREHEN METABOLIC PANEL: CPT

## 2018-09-12 PROCEDURE — 85025 COMPLETE CBC W/AUTO DIFF WBC: CPT

## 2018-09-12 PROCEDURE — 99999 PR PBB SHADOW E&M-EST. PATIENT-LVL III: CPT | Mod: PBBFAC,,, | Performed by: NURSE PRACTITIONER

## 2018-09-12 PROCEDURE — 99214 OFFICE O/P EST MOD 30 MIN: CPT | Mod: S$PBB,,, | Performed by: NURSE PRACTITIONER

## 2018-09-12 RX ORDER — TIZANIDINE 4 MG/1
4 TABLET ORAL EVERY 6 HOURS PRN
Qty: 60 TABLET | Refills: 0 | Status: SHIPPED | OUTPATIENT
Start: 2018-09-12 | End: 2018-09-22

## 2018-09-12 RX ORDER — IRBESARTAN AND HYDROCHLOROTHIAZIDE 150; 12.5 MG/1; MG/1
1 TABLET, FILM COATED ORAL DAILY
Qty: 30 TABLET | Refills: 3 | Status: SHIPPED | OUTPATIENT
Start: 2018-09-12 | End: 2018-10-02 | Stop reason: SDUPTHER

## 2018-09-12 NOTE — PROGRESS NOTES
This dictation has been generated using Modal Fluency Dictation some phonetic errors may occur. Please contact author for clarification if needed.     Problem List Items Addressed This Visit     Essential hypertension - Primary    Relevant Medications    irbesartan-hydrochlorothiazide (AVALIDE) 150-12.5 mg per tablet      Other Visit Diagnoses     ACE-inhibitor cough        Relevant Medications    irbesartan-hydrochlorothiazide (AVALIDE) 150-12.5 mg per tablet    Back pain, unspecified back location, unspecified back pain laterality, unspecified chronicity        Routine medical exam        Relevant Orders    CBC auto differential    Comprehensive metabolic panel    Lipid panel    TSH    Abnormal finding of blood chemistry         Relevant Orders    Lipid panel    Anxiety disorder         Relevant Orders    TSH          Orders Placed This Encounter    CBC auto differential    Comprehensive metabolic panel    Lipid panel    TSH    irbesartan-hydrochlorothiazide (AVALIDE) 150-12.5 mg per tablet    tiZANidine (ZANAFLEX) 4 MG tablet       Hypertension she has an Ace cough with enalapril.  Discontinue and start on ARB.  Routine medical examination needs to update labs as ordered per primary care physician.  Labs as above.  Back spasm treat with tizanidine.  Discussed risk of somnolence.  Anxiety disorder controlled continue current therapies check TSH.    Patient Instructions   Delsym for the cough.      Follow-up if symptoms worsen or fail to improve.    ________________________________________________________________  ________________________________________________________________      Chief Complaint   Patient presents with    Cough    Back Pain     History of present illness  This 68 y.o. presents today for complaint of cough, back pain, needs to complete labs.  Patient established care with 1 of my partners but is new to me.  She reports cough for the past few months.  Patient notes cough throughout the day  and at night time.  It does interrupt her sleep.  She has new blood pressure in allopurinol and symptoms started after that.  Cough is nonproductive.  Activities in deep breath can exacerbate the cough.   No meds.   Patient does complain of back pain. She notes that she does a lot of standing on her job.  This does exacerbate the back pain. She denies history of lifting injury.  Denies other injury.  Patient states she needs to complete her labs.  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  The patient was unable to complete the labs she works at night time and had trouble with the fasting periods.  She is fasting at today's visit.  Review of systems  No fever or chills  No runny nose cold symptoms  No chest pain or shortness of breath  No nausea vomiting or diarrhea.  No reflux symptoms.  No urinary urgency frequency or dysuria  Past Medical History:   Diagnosis Date    Hyperlipidemia     Hypertension        Past Surgical History:   Procedure Laterality Date    CHOLECYSTECTOMY      removal    COLONOSCOPY N/A 11/1/2017    Procedure: COLONOSCOPY;  Surgeon: Francisco Javier Bai MD;  Location: [facility];  Service: Endoscopy;  Laterality: N/A;    COLONOSCOPY N/A 11/1/2017    Performed by Francisco J[facility]ai MD at [facility]    HYSTERECTOMY      MYOMECTOMY      removal    OOPHORECTOMY      TUBAL LIGATION         Family History   Problem Relation Age of Onset    Hypertension Mother     Heart disease Father        Social History     Socioeconomic History    Marital status:      Spouse name: None    Number of children: None    Years of education: None    Highest education level: None   Social Needs    Financial resource strain: None    Food insecurity - worry: None    Food insecurity - inability: None    Transportation needs - medical: None    Transportation needs - non-medical: None   Occupational History    None   Tobacco Use    Smoking status: Current Every Day  Smoker    Smokeless tobacco: Never Used   Substance and Sexual Activity    Alcohol use: Yes     Alcohol/week: 0.0 oz    Drug use: No    Sexual activity: Yes     Partners: Male   Other Topics Concern    None   Social History Narrative    None       Current Outpatient Medications   Medication Sig Dispense Refill    amLODIPine (NORVASC) 5 MG tablet Take 1 tablet (5 mg total) by mouth once daily. 90 tablet 0    escitalopram oxalate (LEXAPRO) 10 MG tablet Take 1 tablet (10 mg total) by mouth once daily. 90 tablet 0    meclizine (ANTIVERT) 25 mg tablet Take 1 tablet (25 mg total) by mouth 3 (three) times daily as needed for Dizziness. 30 tablet 0    metoprolol succinate (TOPROL-XL) 25 MG 24 hr tablet Take 1 tablet (25 mg total) by mouth once daily. 90 tablet 0    multivitamin with minerals tablet       simvastatin (ZOCOR) 20 MG tablet Take 1 tablet (20 mg total) by mouth every evening. 90 tablet 0    irbesartan-hydrochlorothiazide (AVALIDE) 150-12.5 mg per tablet Take 1 tablet by mouth once daily. 30 tablet 3    tiZANidine (ZANAFLEX) 4 MG tablet Take 1 tablet (4 mg total) by mouth every 6 (six) hours as needed (for back spasm). 60 tablet 0     No current facility-administered medications for this visit.        Review of patient's allergies indicates:   Allergen Reactions    Enalapril Other (See Comments)     Ace cough       Physical examination  Vitals Reviewed  Gen. Well-dressed well-nourished   Skin warm dry and intact.  No rashes noted.  HEENT.  TM intact bilateral with normal light reflex.  No mastoid tenderness during percussion.  Nares patent bilateral.  Pharynx is unremarkable.  No maxillary or frontal sinus tenderness when percussed.    Neck is supple without adenopathy  Chest.  Respirations are even unlabored.  Lungs are clear to auscultation.  Cardiac regular rate and rhythm.  No chest wall adenopathy noted.  Neuro. Awake alert oriented x4.  Normal judgment and cognition noted.  Extremities no  clubbing cyanosis or edema noted.     Call or return to clinic prn if these symptoms worsen or fail to improve as anticipated.

## 2018-09-20 ENCOUNTER — PES CALL (OUTPATIENT)
Dept: ADMINISTRATIVE | Facility: CLINIC | Age: 68
End: 2018-09-20

## 2018-09-30 ENCOUNTER — PATIENT MESSAGE (OUTPATIENT)
Dept: FAMILY MEDICINE | Facility: CLINIC | Age: 68
End: 2018-09-30

## 2018-10-02 ENCOUNTER — PATIENT MESSAGE (OUTPATIENT)
Dept: FAMILY MEDICINE | Facility: CLINIC | Age: 68
End: 2018-10-02

## 2018-10-02 DIAGNOSIS — T46.4X5A ACE-INHIBITOR COUGH: ICD-10-CM

## 2018-10-02 DIAGNOSIS — I10 ESSENTIAL HYPERTENSION: ICD-10-CM

## 2018-10-02 DIAGNOSIS — E78.5 HYPERLIPIDEMIA, UNSPECIFIED HYPERLIPIDEMIA TYPE: ICD-10-CM

## 2018-10-02 DIAGNOSIS — R05.8 ACE-INHIBITOR COUGH: ICD-10-CM

## 2018-10-02 DIAGNOSIS — F32.2 MAJOR DEPRESSIVE DISORDER, SINGLE EPISODE, SEVERE WITHOUT PSYCHOTIC FEATURES: ICD-10-CM

## 2018-10-02 RX ORDER — ESCITALOPRAM OXALATE 10 MG/1
TABLET ORAL
Qty: 90 TABLET | Refills: 0 | Status: SHIPPED | OUTPATIENT
Start: 2018-10-02 | End: 2019-02-18 | Stop reason: SDUPTHER

## 2018-10-02 RX ORDER — SIMVASTATIN 20 MG/1
TABLET, FILM COATED ORAL
Qty: 90 TABLET | Refills: 0 | Status: SHIPPED | OUTPATIENT
Start: 2018-10-02 | End: 2019-02-18 | Stop reason: SDUPTHER

## 2018-10-02 RX ORDER — METOPROLOL SUCCINATE 25 MG/1
TABLET, EXTENDED RELEASE ORAL
Qty: 90 TABLET | Refills: 0 | Status: SHIPPED | OUTPATIENT
Start: 2018-10-02 | End: 2019-02-18 | Stop reason: SDUPTHER

## 2018-10-02 RX ORDER — IRBESARTAN AND HYDROCHLOROTHIAZIDE 150; 12.5 MG/1; MG/1
1 TABLET, FILM COATED ORAL DAILY
Qty: 90 TABLET | Refills: 3 | Status: SHIPPED | OUTPATIENT
Start: 2018-10-02 | End: 2019-10-02 | Stop reason: SDUPTHER

## 2018-10-02 RX ORDER — AMLODIPINE BESYLATE 5 MG/1
TABLET ORAL
Qty: 90 TABLET | Refills: 0 | Status: SHIPPED | OUTPATIENT
Start: 2018-10-02 | End: 2019-02-18 | Stop reason: SDUPTHER

## 2019-02-16 ENCOUNTER — PATIENT MESSAGE (OUTPATIENT)
Dept: FAMILY MEDICINE | Facility: CLINIC | Age: 69
End: 2019-02-16

## 2019-02-16 DIAGNOSIS — E78.5 HYPERLIPIDEMIA, UNSPECIFIED HYPERLIPIDEMIA TYPE: ICD-10-CM

## 2019-02-16 DIAGNOSIS — I10 ESSENTIAL HYPERTENSION: ICD-10-CM

## 2019-02-16 DIAGNOSIS — F32.2 MAJOR DEPRESSIVE DISORDER, SINGLE EPISODE, SEVERE WITHOUT PSYCHOTIC FEATURES: ICD-10-CM

## 2019-02-18 RX ORDER — AMLODIPINE BESYLATE 5 MG/1
5 TABLET ORAL DAILY
Qty: 90 TABLET | Refills: 0 | Status: SHIPPED | OUTPATIENT
Start: 2019-02-18 | End: 2019-06-10 | Stop reason: SDUPTHER

## 2019-02-18 RX ORDER — ESCITALOPRAM OXALATE 10 MG/1
TABLET ORAL
Qty: 90 TABLET | Refills: 0 | Status: SHIPPED | OUTPATIENT
Start: 2019-02-18 | End: 2019-06-10 | Stop reason: SDUPTHER

## 2019-02-18 RX ORDER — METOPROLOL SUCCINATE 25 MG/1
TABLET, EXTENDED RELEASE ORAL
Qty: 90 TABLET | Refills: 0 | Status: SHIPPED | OUTPATIENT
Start: 2019-02-18 | End: 2019-06-10 | Stop reason: SDUPTHER

## 2019-02-18 RX ORDER — SIMVASTATIN 20 MG/1
20 TABLET, FILM COATED ORAL NIGHTLY
Qty: 90 TABLET | Refills: 0 | Status: SHIPPED | OUTPATIENT
Start: 2019-02-18 | End: 2019-06-10 | Stop reason: SDUPTHER

## 2019-02-23 ENCOUNTER — NURSE TRIAGE (OUTPATIENT)
Dept: ADMINISTRATIVE | Facility: CLINIC | Age: 69
End: 2019-02-23

## 2019-02-23 NOTE — TELEPHONE ENCOUNTER
"She has a bad cold, started last week, but got worse last night.  She is having chest pain, coughing, body aches, wheezing, and headaches.    Reason for Disposition   Cold with no complications  (all triage questions negative)    Answer Assessment - Initial Assessment Questions  1. ONSET: "When did the nasal discharge start?"       Monday  2. AMOUNT: "How much discharge is there?"       none  3. COUGH: "Do you have a cough?" If yes, ask: "Describe the color of your sputum" (clear, white, yellow, green)      Yes, mild cough with white mucous  4. RESPIRATORY DISTRESS: "Describe your breathing."       wheezing  5. FEVER: "Do you have a fever?" If so, ask: "What is your temperature, how was it measured, and when did it start?"      subjective  6. SEVERITY: "Overall, how bad are you feeling right now?" (e.g., doesn't interfere with normal activities, staying home from school/work, staying in bed)       Staying in bed, missed work last night  7. OTHER SYMPTOMS: "Do you have any other symptoms?" (e.g., sore throat, earache, wheezing, vomiting)      Chest and back pain when she coughs, headache, sore throat, body aches  8. PREGNANCY: "Is there any chance you are pregnant?" "When was your last menstrual period?"      na    Protocols used: ST COLDS-A-      "

## 2019-02-26 ENCOUNTER — HOSPITAL ENCOUNTER (OUTPATIENT)
Dept: RADIOLOGY | Facility: HOSPITAL | Age: 69
Discharge: HOME | End: 2019-02-26
Attending: NURSE PRACTITIONER
Payer: MEDICARE

## 2019-02-26 ENCOUNTER — OFFICE VISIT (OUTPATIENT)
Dept: FAMILY MEDICINE | Facility: CLINIC | Age: 69
End: 2019-02-26
Payer: MEDICARE

## 2019-02-26 ENCOUNTER — TELEPHONE (OUTPATIENT)
Dept: FAMILY MEDICINE | Facility: CLINIC | Age: 69
End: 2019-02-26

## 2019-02-26 VITALS
BODY MASS INDEX: 32.11 KG/M2 | SYSTOLIC BLOOD PRESSURE: 128 MMHG | HEART RATE: 65 BPM | OXYGEN SATURATION: 96 % | DIASTOLIC BLOOD PRESSURE: 76 MMHG | TEMPERATURE: 98 F | HEIGHT: 62 IN | WEIGHT: 174.5 LBS

## 2019-02-26 DIAGNOSIS — F17.200 TOBACCO USE DISORDER: ICD-10-CM

## 2019-02-26 DIAGNOSIS — Z78.0 POSTMENOPAUSAL: ICD-10-CM

## 2019-02-26 DIAGNOSIS — Z23 NEED FOR INFLUENZA VACCINATION: ICD-10-CM

## 2019-02-26 DIAGNOSIS — Z23 NEED FOR PNEUMOCOCCAL VACCINATION: ICD-10-CM

## 2019-02-26 DIAGNOSIS — R05.9 COUGH: ICD-10-CM

## 2019-02-26 DIAGNOSIS — F32.2 MAJOR DEPRESSIVE DISORDER, SINGLE EPISODE, SEVERE WITHOUT PSYCHOTIC FEATURES: ICD-10-CM

## 2019-02-26 DIAGNOSIS — J20.9 ACUTE BRONCHITIS, UNSPECIFIED ORGANISM: ICD-10-CM

## 2019-02-26 DIAGNOSIS — J20.9 ACUTE BRONCHITIS, UNSPECIFIED ORGANISM: Primary | ICD-10-CM

## 2019-02-26 PROCEDURE — 3078F PR MOST RECENT DIASTOLIC BLOOD PRESSURE < 80 MM HG: ICD-10-PCS | Mod: HCNC,CPTII,S$GLB, | Performed by: NURSE PRACTITIONER

## 2019-02-26 PROCEDURE — 71046 XR CHEST PA AND LATERAL: ICD-10-PCS | Mod: 26,HCNC,, | Performed by: RADIOLOGY

## 2019-02-26 PROCEDURE — 71046 X-RAY EXAM CHEST 2 VIEWS: CPT | Mod: TC,HCNC,FY,PO

## 2019-02-26 PROCEDURE — 3074F SYST BP LT 130 MM HG: CPT | Mod: HCNC,CPTII,S$GLB, | Performed by: NURSE PRACTITIONER

## 2019-02-26 PROCEDURE — 99999 PR PBB SHADOW E&M-EST. PATIENT-LVL IV: CPT | Mod: PBBFAC,HCNC,, | Performed by: NURSE PRACTITIONER

## 2019-02-26 PROCEDURE — 90670 PCV13 VACCINE IM: CPT | Mod: HCNC,S$GLB,, | Performed by: NURSE PRACTITIONER

## 2019-02-26 PROCEDURE — 90662 FLU VACCINE - HIGH DOSE (65+) PRESERVATIVE FREE IM: ICD-10-PCS | Mod: HCNC,S$GLB,, | Performed by: NURSE PRACTITIONER

## 2019-02-26 PROCEDURE — 3074F PR MOST RECENT SYSTOLIC BLOOD PRESSURE < 130 MM HG: ICD-10-PCS | Mod: HCNC,CPTII,S$GLB, | Performed by: NURSE PRACTITIONER

## 2019-02-26 PROCEDURE — 90662 IIV NO PRSV INCREASED AG IM: CPT | Mod: HCNC,S$GLB,, | Performed by: NURSE PRACTITIONER

## 2019-02-26 PROCEDURE — 71046 X-RAY EXAM CHEST 2 VIEWS: CPT | Mod: 26,HCNC,, | Performed by: RADIOLOGY

## 2019-02-26 PROCEDURE — 1101F PR PT FALLS ASSESS DOC 0-1 FALLS W/OUT INJ PAST YR: ICD-10-PCS | Mod: HCNC,CPTII,S$GLB, | Performed by: NURSE PRACTITIONER

## 2019-02-26 PROCEDURE — 99999 PR PBB SHADOW E&M-EST. PATIENT-LVL IV: ICD-10-PCS | Mod: PBBFAC,HCNC,, | Performed by: NURSE PRACTITIONER

## 2019-02-26 PROCEDURE — 90670 PNEUMOCOCCAL CONJUGATE VACCINE 13-VALENT LESS THAN 5YO & GREATER THAN: ICD-10-PCS | Mod: HCNC,S$GLB,, | Performed by: NURSE PRACTITIONER

## 2019-02-26 PROCEDURE — G0009 PNEUMOCOCCAL CONJUGATE VACCINE 13-VALENT LESS THAN 5YO & GREATER THAN: ICD-10-PCS | Mod: 59,HCNC,S$GLB, | Performed by: NURSE PRACTITIONER

## 2019-02-26 PROCEDURE — G0009 ADMIN PNEUMOCOCCAL VACCINE: HCPCS | Mod: 59,HCNC,S$GLB, | Performed by: NURSE PRACTITIONER

## 2019-02-26 PROCEDURE — 99214 OFFICE O/P EST MOD 30 MIN: CPT | Mod: 25,HCNC,S$GLB, | Performed by: NURSE PRACTITIONER

## 2019-02-26 PROCEDURE — 1101F PT FALLS ASSESS-DOCD LE1/YR: CPT | Mod: HCNC,CPTII,S$GLB, | Performed by: NURSE PRACTITIONER

## 2019-02-26 PROCEDURE — 3078F DIAST BP <80 MM HG: CPT | Mod: HCNC,CPTII,S$GLB, | Performed by: NURSE PRACTITIONER

## 2019-02-26 PROCEDURE — 99214 PR OFFICE/OUTPT VISIT, EST, LEVL IV, 30-39 MIN: ICD-10-PCS | Mod: 25,HCNC,S$GLB, | Performed by: NURSE PRACTITIONER

## 2019-02-26 PROCEDURE — G0008 ADMIN INFLUENZA VIRUS VAC: HCPCS | Mod: HCNC,S$GLB,, | Performed by: NURSE PRACTITIONER

## 2019-02-26 PROCEDURE — G0008 FLU VACCINE - HIGH DOSE (65+) PRESERVATIVE FREE IM: ICD-10-PCS | Mod: HCNC,S$GLB,, | Performed by: NURSE PRACTITIONER

## 2019-02-26 RX ORDER — AZITHROMYCIN 250 MG/1
TABLET, FILM COATED ORAL
Qty: 6 TABLET | Refills: 0 | Status: SHIPPED | OUTPATIENT
Start: 2019-02-26 | End: 2019-12-12

## 2019-02-26 RX ORDER — BENZONATATE 200 MG/1
200 CAPSULE ORAL 3 TIMES DAILY PRN
Qty: 30 CAPSULE | Refills: 0 | Status: SHIPPED | OUTPATIENT
Start: 2019-02-26 | End: 2019-03-08

## 2019-02-26 NOTE — PATIENT INSTRUCTIONS
Acute Bronchitis  Your healthcare provider has told you that you have acute bronchitis. Bronchitis is infection or inflammation of the bronchial tubes (airways in the lungs). Normally, air moves easily in and out of the airways. Bronchitis narrows the airways, making it harder for air to flow in and out of the lungs. This causes symptoms such as shortness of breath, coughing up yellow or green mucus, and wheezing. Bronchitis can be acute or chronic. Acute means the condition comes on quickly and goes away in a short time, usually within 3 to 10 days. Chronic means a condition lasts a long time and often comes back.    What causes acute bronchitis?  Acute bronchitis almost always starts as a viral respiratory infection, such as a cold or the flu. Certain factors make it more likely for a cold or flu to turn into bronchitis. These include being very young, being elderly, having a heart or lung problem, or having a weak immune system. Cigarette smoking also makes bronchitis more likely.  When bronchitis develops, the airways become swollen. The airways may also become infected with bacteria. This is known as a secondary infection.  Diagnosing acute bronchitis  Your healthcare provider will examine you and ask about your symptoms and health history. You may also have a sputum culture to test the fluid in your lungs. Chest X-rays may be done to look for infection in the lungs.  Treating acute bronchitis  Bronchitis usually clears up as the cold or flu goes away. You can help feel better faster by doing the following:  · Take medicine as directed. You may be told to take ibuprofen or other over-the-counter medicines. These help relieve inflammation in your bronchial tubes. Your healthcare provider may prescribe an inhaler to help open up the bronchial tubes. Most of the time, acute bronchitis is caused by a viral infection. Antibiotics are usually not prescribed for viral infections.  · Drink plenty of fluids, such as  water, juice, or warm soup. Fluids loosen mucus so that you can cough it up. This helps you breathe more easily. Fluids also prevent dehydration.  · Make sure you get plenty of rest.  · Do not smoke. Do not allow anyone else to smoke in your home.  Recovery and follow-up  Follow up with your doctor as you are told. You will likely feel better in a week or two. But a dry cough can linger beyond that time. Let your doctor know if you still have symptoms (other than a dry cough) after 2 weeks, or if youre prone to getting bronchial infections. Take steps to protect yourself from future infections. These steps include stopping smoking and avoiding tobacco smoke, washing your hands often, and getting a yearly flu shot.  When to call your healthcare provider  Call the healthcare provider if you have any of the following:  · Fever of 100.4°F (38.0°C) or higher, or as advised  · Symptoms that get worse, or new symptoms  · Trouble breathing  · Symptoms that dont start to improve within a week, or within 3 days of taking antibiotics   Date Last Reviewed: 12/1/2016  © 3563-6454 [employer]. [address] [place] [address]. All rights reserved. This information is not intended as a substitute for professional medical care. Always follow your healthcare professional's instructions.        Bronchitis, Viral (Adult)    You have a viral bronchitis. Bronchitis is inflammation and swelling of the lining of the lungs. This is often caused by an infection. Symptoms include a dry, hacking cough that is worse at night. The cough may bring up yellow-green mucus. You may also feel short of breath or wheeze. Other symptoms may include tiredness, chest discomfort, and chills.  Bronchitis that is caused by a virus is not treated with antibiotics. Instead, medicines may be given to help relieve symptoms. Symptoms can last up to 2 weeks, although the cough may last much longer.  This illness is contagious during the  first few days and is spread through the air by coughing and sneezing, or by direct contact (touching the sick person and then touching your own eyes, nose, or mouth).  Most viral illnesses resolve within 10 to 14 days with rest and simple home remedies, although they may sometimes last for several weeks.  Home care  · If symptoms are severe, rest at home for the first 2 to 3 days. When you go back to your usual activities, don't let yourself get too tired.  · Do not smoke. Also avoid being exposed to secondhand smoke.  · You may use over-the-counter medicine to control fever or pain, unless another pain medicine was prescribed. (Note: If you have chronic liver or kidney disease or have ever had a stomach ulcer or gastrointestinal bleeding, talk with your healthcare provider before using these medicines. Also talk to your provider if you are taking medicine to prevent blood clots.) Aspirin should never be given to anyone younger than 18 years of age who is ill with a viral infection or fever. It may cause severe liver or brain damage.  · Your appetite may be poor, so a light diet is fine. Avoid dehydration by drinking 6 to 8 glasses of fluids per day (such as water, soft drinks, sports drinks, juices, tea, or soup). Extra fluids will help loosen secretions in the nose and lungs.  · Over-the-counter cough, cold, and sore-throat medicines will not shorten the length of the illness, but they may help to reduce symptoms. (Note: Do not use decongestants if you have high blood pressure.)  Follow-up care  Follow up with your healthcare provider, or as advised. If you had an X-ray or ECG (electrocardiogram), a specialist will review it. You will be notified of any new findings that may affect your care.  Note: If you are age 65 or older, or if you have a chronic lung disease or condition that affects your immune system, or you smoke, talk to your healthcare provider about having pneumococcal vaccinations and a yearly  influenza vaccination (flu shot).  When to seek medical advice  Call your healthcare provider right away if any of these occur:  · Fever of 100.4°F (38°C) or higher  · Coughing up increased amounts of colored sputum  · Weakness, drowsiness, headache, facial pain, ear pain, or a stiff neck  Call 911, or get immediate medical care  Contact emergency services right away if any of these occur:  · Coughing up blood  · Worsening weakness, drowsiness, headache, or stiff neck  · Trouble breathing, wheezing, or pain with breathing  Date Last Reviewed: 9/13/2015  © 1952-3332 [employer]. [address] [place] [address]. All rights reserved. This information is not intended as a substitute for professional medical care. Always follow your healthcare professional's instructions.

## 2019-02-26 NOTE — TELEPHONE ENCOUNTER
----- Message from CHAD Ochoa sent at 2/26/2019 11:05 AM CST -----  Please inform patient due to xray results antibiotics has been sent to the pharmacy. Possible early stage of pneumonia.

## 2019-02-26 NOTE — PROGRESS NOTES
Subjective:       Patient ID: Rosa Isela Pate is a 68 y.o. female.    Chief Complaint: Cough    Cough   This is a new problem. The current episode started in the past 7 days. The problem has been rapidly worsening. The problem occurs constantly. The cough is productive of sputum. Associated symptoms include chills, headaches, myalgias, a sore throat and wheezing. Pertinent negatives include no ear congestion, ear pain, fever, heartburn, hemoptysis, nasal congestion, postnasal drip, rash, rhinorrhea, shortness of breath, sweats or weight loss. The symptoms are aggravated by cold air and lying down. Risk factors for lung disease include smoking/tobacco exposure. She has tried a beta-agonist inhaler for the symptoms. The treatment provided no relief. There is no history of asthma, bronchiectasis, bronchitis, COPD, emphysema, environmental allergies or pneumonia.       Past Medical History:   Diagnosis Date    Hyperlipidemia     Hypertension        Social History     Socioeconomic History    Marital status:      Spouse name: Not on file    Number of children: Not on file    Years of education: Not on file    Highest education level: Not on file   Social Needs    Financial resource strain: Not on file    Food insecurity - worry: Not on file    Food insecurity - inability: Not on file    Transportation needs - medical: Not on file    Transportation needs - non-medical: Not on file   Occupational History    Not on file   Tobacco Use    Smoking status: Current Every Day Smoker    Smokeless tobacco: Never Used   Substance and Sexual Activity    Alcohol use: Yes     Alcohol/week: 0.0 oz    Drug use: No    Sexual activity: Yes     Partners: Male   Other Topics Concern    Not on file   Social History Narrative    Not on file       Past Surgical History:   Procedure Laterality Date    CHOLECYSTECTOMY      removal    COLONOSCOPY N/A 11/1/2017    Performed by [facility]ai [facility] at [facility]     "HYSTERECTOMY      MYOMECTOMY      removal    OOPHORECTOMY      TUBAL LIGATION         Review of Systems   Constitutional: Positive for chills. Negative for fever and weight loss.   HENT: Positive for sore throat. Negative for ear pain, postnasal drip and rhinorrhea.    Respiratory: Positive for cough and wheezing. Negative for hemoptysis and shortness of breath.    Gastrointestinal: Negative for heartburn.   Musculoskeletal: Positive for myalgias.   Skin: Negative for rash.   Allergic/Immunologic: Negative for environmental allergies.   Neurological: Positive for headaches.   All other systems reviewed and are negative.      Objective:   /76 (BP Location: Right arm, Patient Position: Sitting, BP Method: Medium (Manual))   Pulse 65   Temp 97.8 °F (36.6 °C) (Oral)   Ht 5' 2" (1.575 m)   Wt 79.2 kg (174 lb 7.9 oz)   LMP  (LMP Unknown)   SpO2 96%   BMI 31.92 kg/m²      Physical Exam   Constitutional: She is oriented to person, place, and time. She appears well-developed and well-nourished. She is cooperative.   HENT:   Head: Normocephalic and atraumatic.   Right Ear: Hearing, tympanic membrane, external ear and ear canal normal.   Left Ear: Hearing, tympanic membrane, external ear and ear canal normal.   Nose: No rhinorrhea. Right sinus exhibits no maxillary sinus tenderness and no frontal sinus tenderness. Left sinus exhibits no maxillary sinus tenderness and no frontal sinus tenderness.   Mouth/Throat: No oropharyngeal exudate, posterior oropharyngeal edema or posterior oropharyngeal erythema.   Cardiovascular: Normal rate and regular rhythm.   Pulmonary/Chest: Effort normal and breath sounds normal. No respiratory distress. She has no decreased breath sounds. She has no wheezes. She has no rhonchi. She has no rales.   Lymphadenopathy:     She has no cervical adenopathy.   Neurological: She is alert and oriented to person, place, and time.   Skin: Skin is warm, dry and intact. She is not diaphoretic. " No pallor.   Psychiatric: She has a normal mood and affect. Her speech is normal and behavior is normal.   Vitals reviewed.      Assessment:       1. Acute bronchitis, unspecified organism    2. Cough    3. Postmenopausal    4. Tobacco use disorder    5. Major depressive disorder, single episode, severe without psychotic features    6. Need for pneumococcal vaccination    7. Need for influenza vaccination        Plan:       Rosa Isela was seen today for cough.    Diagnoses and all orders for this visit:    Acute bronchitis, unspecified organism  -     X-Ray Chest PA And Lateral; Future    Cough        -     benzonatate (TESSALON) 200 MG capsule; Take 1 capsule (200 mg total) by mouth 3 (three) times daily as needed for Cough.    Postmenopausal  -     DXA Bone Density Spine And Hip; Future    Tobacco use disorder    Major depressive disorder, single episode, severe without psychotic features    Need for pneumococcal vaccination  -     (In Office Administered) Pneumococcal Conjugate Vaccine (13 Valent) (IM)    Need for influenza vaccination  -     Influenza - High Dose (65+) (PF) (IM)      Will follow up once xray results available. She will continue with OTC medication.    Follow-up if symptoms worsen or fail to improve.

## 2019-02-26 NOTE — TELEPHONE ENCOUNTER
Patient informed regarding Xray results and medication sent to her pharmacy.   Patient states understanding.

## 2019-02-26 NOTE — PROGRESS NOTES
Injections  given, tolerated well, verbalized understanding to wait 15 min after, notify staff with any concerns.

## 2019-02-27 ENCOUNTER — TELEPHONE (OUTPATIENT)
Dept: FAMILY MEDICINE | Facility: CLINIC | Age: 69
End: 2019-02-27

## 2019-02-27 NOTE — TELEPHONE ENCOUNTER
----- Message from Donovan Mckeon sent at 2/27/2019  3:15 PM CST -----  Contact: SHAUN DEL RIO [[id number]]  Name of Who is Calling:SHAUN DEL RIO [[id number]]        What is the request in detail: Pt called stating she did not take her azithromycin (Z-AMINAH) 250 MG tablet as directed. She is feeling not feeling.  Contact at your earliest convenience. Thanks-        Can the clinic reply by [facility]: N    What Number to Call Back if not in [facility]: [phone]

## 2019-05-29 ENCOUNTER — PES CALL (OUTPATIENT)
Dept: ADMINISTRATIVE | Facility: CLINIC | Age: 69
End: 2019-05-29

## 2019-06-10 DIAGNOSIS — I10 ESSENTIAL HYPERTENSION: ICD-10-CM

## 2019-06-10 DIAGNOSIS — E78.5 HYPERLIPIDEMIA, UNSPECIFIED HYPERLIPIDEMIA TYPE: ICD-10-CM

## 2019-06-10 DIAGNOSIS — F32.2 MAJOR DEPRESSIVE DISORDER, SINGLE EPISODE, SEVERE WITHOUT PSYCHOTIC FEATURES: ICD-10-CM

## 2019-06-11 RX ORDER — ESCITALOPRAM OXALATE 10 MG/1
TABLET ORAL
Qty: 90 TABLET | Refills: 0 | Status: SHIPPED | OUTPATIENT
Start: 2019-06-11 | End: 2019-10-02 | Stop reason: SDUPTHER

## 2019-06-11 RX ORDER — METOPROLOL SUCCINATE 25 MG/1
TABLET, EXTENDED RELEASE ORAL
Qty: 90 TABLET | Refills: 0 | Status: SHIPPED | OUTPATIENT
Start: 2019-06-11 | End: 2019-10-02 | Stop reason: SDUPTHER

## 2019-06-11 RX ORDER — SIMVASTATIN 20 MG/1
TABLET, FILM COATED ORAL
Qty: 90 TABLET | Refills: 0 | Status: SHIPPED | OUTPATIENT
Start: 2019-06-11 | End: 2019-10-02 | Stop reason: SDUPTHER

## 2019-06-11 RX ORDER — AMLODIPINE BESYLATE 5 MG/1
TABLET ORAL
Qty: 90 TABLET | Refills: 0 | Status: SHIPPED | OUTPATIENT
Start: 2019-06-11 | End: 2019-10-02 | Stop reason: SDUPTHER

## 2019-07-05 ENCOUNTER — OFFICE VISIT (OUTPATIENT)
Dept: FAMILY MEDICINE | Facility: CLINIC | Age: 69
End: 2019-07-05
Payer: MEDICARE

## 2019-07-05 VITALS
TEMPERATURE: 98 F | WEIGHT: 175.25 LBS | HEIGHT: 62 IN | HEART RATE: 60 BPM | OXYGEN SATURATION: 99 % | SYSTOLIC BLOOD PRESSURE: 110 MMHG | DIASTOLIC BLOOD PRESSURE: 80 MMHG | BODY MASS INDEX: 32.25 KG/M2

## 2019-07-05 DIAGNOSIS — Z00.00 ENCOUNTER FOR PREVENTIVE HEALTH EXAMINATION: Primary | ICD-10-CM

## 2019-07-05 DIAGNOSIS — E78.00 PURE HYPERCHOLESTEROLEMIA: ICD-10-CM

## 2019-07-05 DIAGNOSIS — E66.09 CLASS 1 OBESITY DUE TO EXCESS CALORIES WITHOUT SERIOUS COMORBIDITY WITH BODY MASS INDEX (BMI) OF 32.0 TO 32.9 IN ADULT: ICD-10-CM

## 2019-07-05 DIAGNOSIS — F32.2 MAJOR DEPRESSIVE DISORDER, SINGLE EPISODE, SEVERE WITHOUT PSYCHOTIC FEATURES: ICD-10-CM

## 2019-07-05 DIAGNOSIS — I10 ESSENTIAL HYPERTENSION: ICD-10-CM

## 2019-07-05 PROBLEM — E66.811 CLASS 1 OBESITY DUE TO EXCESS CALORIES IN ADULT: Status: ACTIVE | Noted: 2019-07-05

## 2019-07-05 PROCEDURE — 3074F SYST BP LT 130 MM HG: CPT | Mod: HCNC,CPTII,S$GLB, | Performed by: PHYSICIAN ASSISTANT

## 2019-07-05 PROCEDURE — 99999 PR PBB SHADOW E&M-EST. PATIENT-LVL III: ICD-10-PCS | Mod: PBBFAC,HCNC,, | Performed by: PHYSICIAN ASSISTANT

## 2019-07-05 PROCEDURE — 3074F PR MOST RECENT SYSTOLIC BLOOD PRESSURE < 130 MM HG: ICD-10-PCS | Mod: HCNC,CPTII,S$GLB, | Performed by: PHYSICIAN ASSISTANT

## 2019-07-05 PROCEDURE — 99999 PR PBB SHADOW E&M-EST. PATIENT-LVL III: CPT | Mod: PBBFAC,HCNC,, | Performed by: PHYSICIAN ASSISTANT

## 2019-07-05 PROCEDURE — 3079F PR MOST RECENT DIASTOLIC BLOOD PRESSURE 80-89 MM HG: ICD-10-PCS | Mod: HCNC,CPTII,S$GLB, | Performed by: PHYSICIAN ASSISTANT

## 2019-07-05 PROCEDURE — G0439 PPPS, SUBSEQ VISIT: HCPCS | Mod: HCNC,S$GLB,, | Performed by: PHYSICIAN ASSISTANT

## 2019-07-05 PROCEDURE — G0439 PR MEDICARE ANNUAL WELLNESS SUBSEQUENT VISIT: ICD-10-PCS | Mod: HCNC,S$GLB,, | Performed by: PHYSICIAN ASSISTANT

## 2019-07-05 PROCEDURE — 3079F DIAST BP 80-89 MM HG: CPT | Mod: HCNC,CPTII,S$GLB, | Performed by: PHYSICIAN ASSISTANT

## 2019-07-05 NOTE — ASSESSMENT & PLAN NOTE
The patient is asked to make an attempt to improve diet and exercise patterns to aid in medical management of this problem. We specifically discussed cutting calorie intake by 500-1000 calories per day for a goal of a 1-2 pound weight loss and recommendations for a mostly plant based diet with limited red meats/refined grains/processed foods/added sugars.

## 2019-07-05 NOTE — PROGRESS NOTES
"Rosa Isela Pate  presented for Comprehensive Health Risk Assessment and Medicare AWV today. The following components were reviewed and updated:    · Medical history  · Family History  · Social history  · Allergies and Current Medications  · Health Risk Assessment  · Health Maintenance  · Care Team    **See Completed Assessments for Annual Wellness visit with in the encounter summary    The following assessments were completed:  · Living situation  · Cage  · Depression Screening  · Cognitive function Screening  · Timed Get Up Test  · Whisper Test  · Nutrition screning  · ADL screening  · PAQ screening      /80   Pulse 60   Temp 98.1 °F (36.7 °C) (Oral)   Ht 5' 2" (1.575 m)   Wt 79.5 kg (175 lb 4.3 oz)   LMP  (LMP Unknown)   SpO2 99%   BMI 32.06 kg/m²      Body mass index is 32.06 kg/m².       General: Well developed, well nourished. No acute distress.  Eyes: Clear conjunctiva. EOM are intact. PERRL.   Neck: Supple, symmetrical neck; trachea midline. Thyroid gland is normal without masses. Carotid pulse 2+ bilaterally.   Lungs: Clear to auscultation bilaterally and normal respiratory effort. No rales, rhonchi, or wheezing.  Cardiovascular: Regular rate and rhythm. No murmurs, gallops or rubs auscultated. Normal S1 and S2.  Extremities: No LE edema. Pulses 2+ and symmetric.   Abdomen: Abdomen is soft, non-tender non-distended with normal bowel sounds.  Skin: Skin color, texture, turgor normal. No rashes.  Lymph Nodes: No cervical or supraclavicular adenopathy.  Psychiatric: Appropriate mood and affect. Good judgment and insight.        Diagnoses and health risks identified today and associated recommendations/orders:    Rosa Isela was seen today for medicare awv.    Diagnoses and all orders for this visit:    Encounter for preventive health examination  Education provided about preventive health examinations and procedures; addressed and discussed patient's health concerns. Additionally, reviewed medical " record for risk factors and documented the results during this encounter.      Problem list issues addressed during this visit    Class 1 obesity due to excess calories in adult  The patient is asked to make an attempt to improve diet and exercise patterns to aid in medical management of this problem. We specifically discussed cutting calorie intake by 500-1000 calories per day for a goal of a 1-2 pound weight loss and recommendations for a mostly plant based diet with limited red meats/refined grains/processed foods/added sugars.      Essential hypertension  Stable, chronic as reviewed in record, controlled with medication   followed by PCP  Continue current treatment plan      Major depressive disorder, single episode, severe without psychotic features  Stable, chronic as reviewed in record, controlled with medication   followed by PCP  Continue current treatment plan      Hyperlipidemia  Stable, chronic as reviewed in record, controlled with medication   followed by PCP  Continue current treatment plan         Reviewed health maintenance with patient, educated about recommended examinations, procedures (labs & images), and immunization.  Patient aware of need for for vaccines.      Provided Sandeep with a 5-10 year written screening schedule and personal prevention plan. Recommendations were developed using the USPSTF age appropriate recommendations. Education, counseling, and referrals were provided as needed.  After Visit Summary printed and given to patient which includes a list of additional screenings\tests needed.    Follow up in about 1 year (around 7/5/2020).     Ciara Bagley PA-C

## 2019-07-05 NOTE — PATIENT INSTRUCTIONS
Counseling and Referral of Other Preventative  (Italic type indicates deductible and co-insurance are waived)    Patient Name: Rosa Isela Pate  Today's Date: 7/5/2019    Health Maintenance       Date Due Completion Date    TETANUS VACCINE 07/12/1968 ---    DEXA SCAN 07/12/1990 ---    Shingles Vaccine (1 of 2) 07/12/2000 ---    Influenza Vaccine 08/01/2019 2/26/2019    Pneumococcal Vaccine (65+ Low/Medium Risk) (2 of 2 - PPSV23) 02/26/2020 2/26/2019    Mammogram 04/19/2020 4/19/2018    Lipid Panel 09/12/2023 9/12/2018    Colonoscopy 11/01/2027 11/1/2017        No orders of the defined types were placed in this encounter.    The following information is provided to all patients.  This information is to help you find resources for any of the problems found today that may be affecting your health:                Living healthy guide: www.[facility].[place].[facility]      Understanding Diabetes: www.diabetes.org      Eating healthy: www.cdc.gov/healthyweight      CDC home safety checklist: www.cdc.gov/steadi/patient.html      Agency on Aging: www.goea.[place].[facility]      Alcoholics anonymous (AA): www.aa.org      Physical Activity: www.efren.nih.gov/vb1yyhz      Tobacco use: www.quitwithusla.org

## 2019-07-08 NOTE — PROGRESS NOTES
Spoke with patient on 7/8/19 to update section of Health Risk Assessment- Symptoms and Findings.     This addendum was created on 07/08/2019 at 8:48 AM    Ciara Bagley PA-C  [facility]/ [facility] Med

## 2019-10-02 ENCOUNTER — PATIENT MESSAGE (OUTPATIENT)
Dept: FAMILY MEDICINE | Facility: CLINIC | Age: 69
End: 2019-10-02

## 2019-10-02 DIAGNOSIS — R05.8 ACE-INHIBITOR COUGH: ICD-10-CM

## 2019-10-02 DIAGNOSIS — T46.4X5A ACE-INHIBITOR COUGH: ICD-10-CM

## 2019-10-02 DIAGNOSIS — F32.2 MAJOR DEPRESSIVE DISORDER, SINGLE EPISODE, SEVERE WITHOUT PSYCHOTIC FEATURES: ICD-10-CM

## 2019-10-02 DIAGNOSIS — I10 ESSENTIAL HYPERTENSION: ICD-10-CM

## 2019-10-02 DIAGNOSIS — E78.5 HYPERLIPIDEMIA, UNSPECIFIED HYPERLIPIDEMIA TYPE: ICD-10-CM

## 2019-10-03 RX ORDER — ESCITALOPRAM OXALATE 10 MG/1
TABLET ORAL
Qty: 90 TABLET | Refills: 0 | Status: SHIPPED | OUTPATIENT
Start: 2019-10-03 | End: 2020-06-17 | Stop reason: SDUPTHER

## 2019-10-03 RX ORDER — AMLODIPINE BESYLATE 5 MG/1
5 TABLET ORAL DAILY
Qty: 90 TABLET | Refills: 0 | Status: SHIPPED | OUTPATIENT
Start: 2019-10-03 | End: 2019-12-02 | Stop reason: SDUPTHER

## 2019-10-03 RX ORDER — METOPROLOL SUCCINATE 25 MG/1
TABLET, EXTENDED RELEASE ORAL
Qty: 30 TABLET | Refills: 0 | Status: SHIPPED | OUTPATIENT
Start: 2019-10-03 | End: 2019-12-02 | Stop reason: SDUPTHER

## 2019-10-03 RX ORDER — SIMVASTATIN 20 MG/1
20 TABLET, FILM COATED ORAL NIGHTLY
Qty: 30 TABLET | Refills: 0 | Status: SHIPPED | OUTPATIENT
Start: 2019-10-03 | End: 2019-12-12 | Stop reason: SDUPTHER

## 2019-10-03 RX ORDER — IRBESARTAN AND HYDROCHLOROTHIAZIDE 150; 12.5 MG/1; MG/1
TABLET, FILM COATED ORAL
Qty: 90 TABLET | Refills: 3 | Status: SHIPPED | OUTPATIENT
Start: 2019-10-03 | End: 2019-12-02 | Stop reason: SDUPTHER

## 2019-10-03 NOTE — TELEPHONE ENCOUNTER
Called patient to inform her that her medication was already sent through yesterday by a different provider.

## 2019-12-01 ENCOUNTER — PATIENT MESSAGE (OUTPATIENT)
Dept: FAMILY MEDICINE | Facility: CLINIC | Age: 69
End: 2019-12-01

## 2019-12-01 DIAGNOSIS — T46.4X5A ACE-INHIBITOR COUGH: ICD-10-CM

## 2019-12-01 DIAGNOSIS — I10 ESSENTIAL HYPERTENSION: ICD-10-CM

## 2019-12-01 DIAGNOSIS — F32.2 MAJOR DEPRESSIVE DISORDER, SINGLE EPISODE, SEVERE WITHOUT PSYCHOTIC FEATURES: ICD-10-CM

## 2019-12-01 DIAGNOSIS — R05.8 ACE-INHIBITOR COUGH: ICD-10-CM

## 2019-12-02 RX ORDER — METOPROLOL SUCCINATE 25 MG/1
TABLET, EXTENDED RELEASE ORAL
Qty: 30 TABLET | Refills: 0 | Status: SHIPPED | OUTPATIENT
Start: 2019-12-02 | End: 2019-12-12 | Stop reason: SDUPTHER

## 2019-12-02 RX ORDER — IRBESARTAN AND HYDROCHLOROTHIAZIDE 150; 12.5 MG/1; MG/1
1 TABLET, FILM COATED ORAL DAILY
Qty: 30 TABLET | Refills: 0 | Status: SHIPPED | OUTPATIENT
Start: 2019-12-02 | End: 2020-06-17 | Stop reason: SDUPTHER

## 2019-12-02 RX ORDER — AMLODIPINE BESYLATE 5 MG/1
5 TABLET ORAL DAILY
Qty: 30 TABLET | Refills: 0 | Status: SHIPPED | OUTPATIENT
Start: 2019-12-02 | End: 2020-03-16 | Stop reason: SDUPTHER

## 2019-12-12 ENCOUNTER — OFFICE VISIT (OUTPATIENT)
Dept: FAMILY MEDICINE | Facility: CLINIC | Age: 69
End: 2019-12-12
Payer: MEDICARE

## 2019-12-12 VITALS
TEMPERATURE: 98 F | HEIGHT: 62 IN | SYSTOLIC BLOOD PRESSURE: 120 MMHG | HEART RATE: 56 BPM | BODY MASS INDEX: 31.89 KG/M2 | OXYGEN SATURATION: 99 % | DIASTOLIC BLOOD PRESSURE: 70 MMHG | WEIGHT: 173.31 LBS

## 2019-12-12 DIAGNOSIS — I10 ESSENTIAL HYPERTENSION: ICD-10-CM

## 2019-12-12 DIAGNOSIS — E78.5 HYPERLIPIDEMIA, UNSPECIFIED HYPERLIPIDEMIA TYPE: ICD-10-CM

## 2019-12-12 DIAGNOSIS — G56.03 BILATERAL CARPAL TUNNEL SYNDROME: Primary | ICD-10-CM

## 2019-12-12 PROCEDURE — 99999 PR PBB SHADOW E&M-EST. PATIENT-LVL III: CPT | Mod: PBBFAC,HCNC,, | Performed by: FAMILY MEDICINE

## 2019-12-12 PROCEDURE — 99214 PR OFFICE/OUTPT VISIT, EST, LEVL IV, 30-39 MIN: ICD-10-PCS | Mod: HCNC,S$GLB,, | Performed by: FAMILY MEDICINE

## 2019-12-12 PROCEDURE — 99214 OFFICE O/P EST MOD 30 MIN: CPT | Mod: HCNC,S$GLB,, | Performed by: FAMILY MEDICINE

## 2019-12-12 PROCEDURE — 99499 RISK ADDL DX/OHS AUDIT: ICD-10-PCS | Mod: S$GLB,,, | Performed by: FAMILY MEDICINE

## 2019-12-12 PROCEDURE — 99499 UNLISTED E&M SERVICE: CPT | Mod: S$GLB,,, | Performed by: FAMILY MEDICINE

## 2019-12-12 PROCEDURE — 99999 PR PBB SHADOW E&M-EST. PATIENT-LVL III: ICD-10-PCS | Mod: PBBFAC,HCNC,, | Performed by: FAMILY MEDICINE

## 2019-12-12 RX ORDER — SIMVASTATIN 20 MG/1
20 TABLET, FILM COATED ORAL NIGHTLY
Qty: 90 TABLET | Refills: 3 | Status: SHIPPED | OUTPATIENT
Start: 2019-12-12 | End: 2020-03-16 | Stop reason: SDUPTHER

## 2019-12-12 RX ORDER — METOPROLOL SUCCINATE 25 MG/1
TABLET, EXTENDED RELEASE ORAL
Qty: 90 TABLET | Refills: 3 | Status: SHIPPED | OUTPATIENT
Start: 2019-12-12 | End: 2020-03-16 | Stop reason: SDUPTHER

## 2019-12-12 RX ORDER — CELECOXIB 100 MG/1
100 CAPSULE ORAL DAILY
Qty: 90 CAPSULE | Refills: 0 | Status: SHIPPED | OUTPATIENT
Start: 2019-12-12 | End: 2020-06-17 | Stop reason: SDUPTHER

## 2019-12-12 NOTE — PROGRESS NOTES
Assessment & Plan  Problem List Items Addressed This Visit        Cardiac/Vascular    Hyperlipidemia    Relevant Medications    simvastatin (ZOCOR) 20 MG tablet    Essential hypertension    Relevant Medications    metoprolol succinate (TOPROL-XL) 25 MG 24 hr tablet      Other Visit Diagnoses     Bilateral carpal tunnel syndrome    -  Primary    Relevant Medications    celecoxib (CELEBREX) 100 MG capsule            Health Maintenance reviewed    Follow-up: Follow up in about 6 months (around 6/12/2020).    ______________________________________________________________________    Chief Complaint  Chief Complaint   Patient presents with    Medication Refill    numbness and arms and hands       HPI  Rosa Isela Pate is a 69 y.o. female with multiple medical diagnoses as listed in the medical history and problem list that presents for medication refill.  Pt is known to me with last appointment 4/18/2018.    She has noted increased numbness in hands and in arms.    Hypertension: The patient reports that they check their blood pressures regularly and blood pressure is generally well controlled (<= 139/89).  The patient  is not enrolled in the digital hypertension program. The patient denies  cardiac chest pain, shortness of breath, lower extremity edema, headaches and side effects of medication. The patient reports problems with  none. The patient  has been compliant with the current medication regimen.  The patient : tries to follow a low salt diet.  Hyperlipidemia: Patient reports that they have been compliant with low fat diet and regular exercise. Patient reports that they have been compliant with their medication regimen and deny any problems/side effects from the medication.      PAST MEDICAL HISTORY:  Past Medical History:   Diagnosis Date    Depression     Hyperlipidemia     Hypertension     Obesity     Tobacco dependence        PAST SURGICAL HISTORY:  Past Surgical History:   Procedure Laterality Date     CHOLECYSTECTOMY      removal    COLONOSCOPY N/A 11/1/2017    Procedure: COLONOSCOPY;  Surgeon: Francisco Javier Bai MD;  Location: [facility];  Service: Endoscopy;  Laterality: N/A;    HYSTERECTOMY      MYOMECTOMY      removal    OOPHORECTOMY      TUBAL LIGATION         SOCIAL HISTORY:  Social History     Socioeconomic History    Marital status:      Spouse name: Not on file    Number of children: Not on file    Years of education: Not on file    Highest education level: Not on file   Occupational History    Not on file   Social Needs    Financial resource strain: Not on file    Food insecurity:     Worry: Not on file     Inability: Not on file    Transportation needs:     Medical: Not on file     Non-medical: Not on file   Tobacco Use    Smoking status: Current Every Day Smoker    Smokeless tobacco: Never Used   Substance and Sexual Activity    Alcohol use: Yes     Alcohol/week: 0.0 standard drinks    Drug use: No    Sexual activity: Yes     Partners: Male   Lifestyle    Physical activity:     Days per week: Not on file     Minutes per session: Not on file    Stress: Not on file   Relationships    Social connections:     Talks on phone: Not on file     Gets together: Not on file     Attends Anglican service: Not on file     Active member of club or organization: Not on file     Attends meetings of clubs or organizations: Not on file     Relationship status: Not on file   Other Topics Concern    Not on file   Social History Narrative    Not on file       FAMILY HISTORY:  Family History   Problem Relation Age of Onset    Hypertension Mother     Heart disease Father        ALLERGIES AND MEDICATIONS: updated and reviewed.  Review of patient's allergies indicates:   Allergen Reactions    Enalapril Other (See Comments)     Ace cough     Current Outpatient Medications   Medication Sig Dispense Refill    amLODIPine (NORVASC) 5 MG tablet Take 1 tablet (5 mg total) by mouth once daily.  Appt needed with Dr. Hodgson for future refills 30 tablet 0    escitalopram oxalate (LEXAPRO) 10 MG tablet TAKE 1 TABLET EVERY DAY. Appt needed with Dr. Hodgson for future refills 90 tablet 0    irbesartan-hydrochlorothiazide (AVALIDE) 150-12.5 mg per tablet Take 1 tablet by mouth once daily. 30 tablet 0    meclizine (ANTIVERT) 25 mg tablet Take 1 tablet (25 mg total) by mouth 3 (three) times daily as needed for Dizziness. 30 tablet 0    metoprolol succinate (TOPROL-XL) 25 MG 24 hr tablet TAKE 1 TABLET EVERY DAY. 90 tablet 3    simvastatin (ZOCOR) 20 MG tablet Take 1 tablet (20 mg total) by mouth every evening. 90 tablet 3    celecoxib (CELEBREX) 100 MG capsule Take 1 capsule (100 mg total) by mouth once daily. 90 capsule 0    multivitamin with minerals tablet        No current facility-administered medications for this visit.          ROS  Review of Systems   Constitutional: Negative for activity change, appetite change, fatigue, fever and unexpected weight change.   HENT: Negative.  Negative for ear discharge, ear pain, rhinorrhea and sore throat.    Eyes: Negative.    Respiratory: Negative for apnea, cough, chest tightness, shortness of breath and wheezing.    Cardiovascular: Negative for chest pain, palpitations and leg swelling.   Gastrointestinal: Negative for abdominal distention, abdominal pain, constipation, diarrhea and vomiting.   Endocrine: Negative for cold intolerance, heat intolerance, polydipsia and polyuria.   Genitourinary: Negative for decreased urine volume and urgency.   Musculoskeletal: Negative.    Skin: Negative for rash.   Neurological: Negative for dizziness and headaches.   Hematological: Does not bruise/bleed easily.   Psychiatric/Behavioral: Negative for agitation, sleep disturbance and suicidal ideas.           Physical Exam  Vitals:    12/12/19 0937   BP: 120/70   BP Location: Left arm   Patient Position: Sitting   BP Method: Large (Manual)   Pulse: (!) 56   Temp: 98.4 °F  "(36.9 °C)   TempSrc: Oral   SpO2: 99%   Weight: 78.6 kg (173 lb 4.5 oz)   Height: 5' 2" (1.575 m)    Body mass index is 31.69 kg/m².  Weight: 78.6 kg (173 lb 4.5 oz)   Height: 5' 2" (157.5 cm)   Physical Exam   Constitutional: She is oriented to person, place, and time. She appears well-developed and well-nourished.   HENT:   Head: Normocephalic and atraumatic.   Right Ear: External ear normal.   Left Ear: External ear normal.   Nose: Nose normal.   Mouth/Throat: Oropharynx is clear and moist.   Eyes: Pupils are equal, round, and reactive to light. Conjunctivae and EOM are normal.   Cardiovascular: Normal rate, regular rhythm and normal heart sounds.   Pulmonary/Chest: Effort normal and breath sounds normal.   Neurological: She is alert and oriented to person, place, and time.   Skin: Skin is warm and dry.   Vitals reviewed.        Health Maintenance       Date Due Completion Date    TETANUS VACCINE 07/12/1968 ---    DEXA SCAN 07/12/1990 ---    Shingles Vaccine (1 of 2) 07/12/2000 ---    Influenza Vaccine (1) 09/01/2019 2/26/2019    Pneumococcal Vaccine (65+ Low/Medium Risk) (2 of 2 - PPSV23) 02/26/2020 2/26/2019    Mammogram 04/19/2020 4/19/2018    Lipid Panel 09/12/2023 9/12/2018    Colonoscopy 11/01/2027 11/1/2017              Patient note was created using [employer].  Any errors in syntax or even information may not have been identified and edited on initial review prior to signing this note.  "

## 2020-01-06 DIAGNOSIS — I10 ESSENTIAL HYPERTENSION: ICD-10-CM

## 2020-03-16 ENCOUNTER — PATIENT MESSAGE (OUTPATIENT)
Dept: FAMILY MEDICINE | Facility: CLINIC | Age: 70
End: 2020-03-16

## 2020-03-16 DIAGNOSIS — I10 ESSENTIAL HYPERTENSION: ICD-10-CM

## 2020-03-16 DIAGNOSIS — E78.5 HYPERLIPIDEMIA, UNSPECIFIED HYPERLIPIDEMIA TYPE: ICD-10-CM

## 2020-03-16 DIAGNOSIS — F32.2 MAJOR DEPRESSIVE DISORDER, SINGLE EPISODE, SEVERE WITHOUT PSYCHOTIC FEATURES: ICD-10-CM

## 2020-03-16 RX ORDER — AMLODIPINE BESYLATE 5 MG/1
5 TABLET ORAL DAILY
Qty: 30 TABLET | Refills: 0 | Status: SHIPPED | OUTPATIENT
Start: 2020-03-16 | End: 2020-04-03 | Stop reason: SDUPTHER

## 2020-03-16 RX ORDER — SIMVASTATIN 20 MG/1
20 TABLET, FILM COATED ORAL NIGHTLY
Qty: 90 TABLET | Refills: 3 | Status: SHIPPED | OUTPATIENT
Start: 2020-03-16 | End: 2020-04-03 | Stop reason: SDUPTHER

## 2020-03-16 RX ORDER — METOPROLOL SUCCINATE 25 MG/1
TABLET, EXTENDED RELEASE ORAL
Qty: 90 TABLET | Refills: 3 | Status: SHIPPED | OUTPATIENT
Start: 2020-03-16 | End: 2020-04-03 | Stop reason: SDUPTHER

## 2020-03-19 ENCOUNTER — PATIENT MESSAGE (OUTPATIENT)
Dept: FAMILY MEDICINE | Facility: CLINIC | Age: 70
End: 2020-03-19

## 2020-03-24 ENCOUNTER — PATIENT MESSAGE (OUTPATIENT)
Dept: FAMILY MEDICINE | Facility: CLINIC | Age: 70
End: 2020-03-24

## 2020-04-02 ENCOUNTER — PATIENT MESSAGE (OUTPATIENT)
Dept: FAMILY MEDICINE | Facility: CLINIC | Age: 70
End: 2020-04-02

## 2020-04-02 DIAGNOSIS — F32.2 MAJOR DEPRESSIVE DISORDER, SINGLE EPISODE, SEVERE WITHOUT PSYCHOTIC FEATURES: ICD-10-CM

## 2020-04-02 DIAGNOSIS — E78.5 HYPERLIPIDEMIA, UNSPECIFIED HYPERLIPIDEMIA TYPE: ICD-10-CM

## 2020-04-02 DIAGNOSIS — I10 ESSENTIAL HYPERTENSION: ICD-10-CM

## 2020-04-03 ENCOUNTER — PATIENT MESSAGE (OUTPATIENT)
Dept: FAMILY MEDICINE | Facility: CLINIC | Age: 70
End: 2020-04-03

## 2020-04-03 DIAGNOSIS — I10 ESSENTIAL HYPERTENSION: ICD-10-CM

## 2020-04-03 DIAGNOSIS — F32.2 MAJOR DEPRESSIVE DISORDER, SINGLE EPISODE, SEVERE WITHOUT PSYCHOTIC FEATURES: ICD-10-CM

## 2020-04-03 DIAGNOSIS — E78.5 HYPERLIPIDEMIA, UNSPECIFIED HYPERLIPIDEMIA TYPE: ICD-10-CM

## 2020-04-03 RX ORDER — METOPROLOL SUCCINATE 25 MG/1
TABLET, EXTENDED RELEASE ORAL
Qty: 90 TABLET | Refills: 3 | Status: CANCELLED | OUTPATIENT
Start: 2020-04-03

## 2020-04-03 RX ORDER — SIMVASTATIN 20 MG/1
20 TABLET, FILM COATED ORAL NIGHTLY
Qty: 90 TABLET | Refills: 3 | Status: CANCELLED | OUTPATIENT
Start: 2020-04-03

## 2020-04-03 NOTE — TELEPHONE ENCOUNTER
----- Message from Flako Perry sent at 4/3/2020 12:36 PM CDT -----  Contact: Self  Type: RX Refill Request    Who Called: Self    Have you contacted your pharmacy: no  Refill or New Rx: refill     RX Name and Strength: metoprolol succinate (TOPROL-XL) 25 MG 24 hr tablet                                                  amLODIPine (NORVASC) 5 MG tablet                                                                simvastatin (ZOCOR) 20 MG tablet      Preferred Pharmacy with phone number: [facility] - [facility] [address] [phone] (Phone)  [phone] (Fax)        Local or Mail Order: local     Ordering Provider: justina    Would the patient rather a call back or a response via [facility]? Call     Best Call Back Number [phone]    Additional Information:  [facility] never received the scripts. I updated the preferred pharmacy based on the number the patient provided. Please resubmit      Can you please send partial scripts of all three meds to   [id number]  [place] - [address] [facility] AT [facility] [phone] (Phone)  [phone] (Fax)

## 2020-04-06 ENCOUNTER — PATIENT MESSAGE (OUTPATIENT)
Dept: FAMILY MEDICINE | Facility: CLINIC | Age: 70
End: 2020-04-06

## 2020-04-06 RX ORDER — AMLODIPINE BESYLATE 5 MG/1
5 TABLET ORAL DAILY
Qty: 30 TABLET | Refills: 0 | Status: SHIPPED | OUTPATIENT
Start: 2020-04-06 | End: 2020-10-09 | Stop reason: SDUPTHER

## 2020-04-06 RX ORDER — METOPROLOL SUCCINATE 25 MG/1
TABLET, EXTENDED RELEASE ORAL
Qty: 90 TABLET | Refills: 3 | Status: SHIPPED | OUTPATIENT
Start: 2020-04-06 | End: 2020-11-19 | Stop reason: SDUPTHER

## 2020-04-06 RX ORDER — AMLODIPINE BESYLATE 5 MG/1
5 TABLET ORAL DAILY
Qty: 90 TABLET | Refills: 0 | Status: SHIPPED | OUTPATIENT
Start: 2020-04-06 | End: 2020-06-16

## 2020-04-06 RX ORDER — SIMVASTATIN 20 MG/1
20 TABLET, FILM COATED ORAL NIGHTLY
Qty: 90 TABLET | Refills: 3 | Status: SHIPPED | OUTPATIENT
Start: 2020-04-06 | End: 2020-08-05 | Stop reason: SDUPTHER

## 2020-04-06 RX ORDER — AMLODIPINE BESYLATE 5 MG/1
5 TABLET ORAL DAILY
Qty: 30 TABLET | Refills: 0 | Status: SHIPPED | OUTPATIENT
Start: 2020-04-06 | End: 2020-08-07 | Stop reason: SDUPTHER

## 2020-04-14 ENCOUNTER — PATIENT MESSAGE (OUTPATIENT)
Dept: FAMILY MEDICINE | Facility: CLINIC | Age: 70
End: 2020-04-14

## 2020-06-18 ENCOUNTER — PATIENT MESSAGE (OUTPATIENT)
Dept: FAMILY MEDICINE | Facility: CLINIC | Age: 70
End: 2020-06-18

## 2020-06-18 NOTE — TELEPHONE ENCOUNTER
Spoke with patient and she was notified that the medications was sent the pharmacy that she previously requested which was the [facility] and the [facility].

## 2020-08-07 DIAGNOSIS — F32.2 MAJOR DEPRESSIVE DISORDER, SINGLE EPISODE, SEVERE WITHOUT PSYCHOTIC FEATURES: ICD-10-CM

## 2020-08-07 RX ORDER — AMLODIPINE BESYLATE 5 MG/1
5 TABLET ORAL DAILY
Qty: 30 TABLET | Refills: 0 | Status: SHIPPED | OUTPATIENT
Start: 2020-08-07 | End: 2020-10-20

## 2020-10-08 ENCOUNTER — PATIENT MESSAGE (OUTPATIENT)
Dept: FAMILY MEDICINE | Facility: CLINIC | Age: 70
End: 2020-10-08

## 2020-10-08 DIAGNOSIS — G56.03 BILATERAL CARPAL TUNNEL SYNDROME: ICD-10-CM

## 2020-10-08 DIAGNOSIS — F32.2 MAJOR DEPRESSIVE DISORDER, SINGLE EPISODE, SEVERE WITHOUT PSYCHOTIC FEATURES: ICD-10-CM

## 2020-10-13 ENCOUNTER — PES CALL (OUTPATIENT)
Dept: ADMINISTRATIVE | Facility: CLINIC | Age: 70
End: 2020-10-13

## 2020-10-15 RX ORDER — CELECOXIB 100 MG/1
100 CAPSULE ORAL DAILY
Qty: 90 CAPSULE | Refills: 0 | Status: SHIPPED | OUTPATIENT
Start: 2020-10-15 | End: 2020-11-19 | Stop reason: SDUPTHER

## 2020-10-15 RX ORDER — ESCITALOPRAM OXALATE 10 MG/1
TABLET ORAL
Qty: 90 TABLET | Refills: 0 | Status: SHIPPED | OUTPATIENT
Start: 2020-10-15 | End: 2020-11-19 | Stop reason: SDUPTHER

## 2020-10-15 RX ORDER — AMLODIPINE BESYLATE 5 MG/1
5 TABLET ORAL DAILY
Qty: 90 TABLET | Refills: 0 | Status: SHIPPED | OUTPATIENT
Start: 2020-10-15 | End: 2020-10-20

## 2020-10-15 NOTE — TELEPHONE ENCOUNTER
Please contact patient. Patient will need to schedule an appointment for future refills.     justina Patient

## 2020-10-19 ENCOUNTER — PES CALL (OUTPATIENT)
Dept: ADMINISTRATIVE | Facility: CLINIC | Age: 70
End: 2020-10-19

## 2020-10-20 ENCOUNTER — PATIENT MESSAGE (OUTPATIENT)
Dept: FAMILY MEDICINE | Facility: CLINIC | Age: 70
End: 2020-10-20

## 2020-10-23 ENCOUNTER — TELEPHONE (OUTPATIENT)
Dept: FAMILY MEDICINE | Facility: CLINIC | Age: 70
End: 2020-10-23

## 2020-10-23 NOTE — TELEPHONE ENCOUNTER
Spoke to patient and she stated she have been experiencing  Chest pain right in the center along with left arm pain going up the to the shoulder. Rating pain as a 10 on a scale of 0-10. Per Dr. Paz I explained patient need to go the ED. Patient stated good understanding and is on her way.

## 2020-10-23 NOTE — TELEPHONE ENCOUNTER
----- Message from Flako Vicky sent at 10/23/2020  9:01 AM CDT -----  Regarding: self  Type: Patient Call Back    Who called: self    What is the request in detail:please call the patient about her refills and clarify what medications are being replaced.    Can the clinic reply by [facility]? no  Would the patient rather a call back or a response via [facility]? call    Best call back number: [phone]    Additional Information: pt has been going through this since 10/08/20 please call to get this taken care of in a timely manner.          Initial anticoag note and most recent pcp and heme notes reviewed.  Pending further recs, we will continue with indefinite anticoag with warfarin for antiphospholipiid ab syndrome with h/o dvt and tia/cva as recommended by pcp and heme.  Will defer all other care, aside from management of warfarin dosing and following inr to pcp and heme.    Michael Bloch, MD  Antico[facility]    Cc:  PAUL Herbert

## 2020-10-26 ENCOUNTER — PATIENT MESSAGE (OUTPATIENT)
Dept: FAMILY MEDICINE | Facility: CLINIC | Age: 70
End: 2020-10-26

## 2020-10-27 ENCOUNTER — PATIENT MESSAGE (OUTPATIENT)
Dept: FAMILY MEDICINE | Facility: CLINIC | Age: 70
End: 2020-10-27

## 2020-11-19 ENCOUNTER — OFFICE VISIT (OUTPATIENT)
Dept: FAMILY MEDICINE | Facility: CLINIC | Age: 70
End: 2020-11-19
Payer: MEDICARE

## 2020-11-19 VITALS
DIASTOLIC BLOOD PRESSURE: 72 MMHG | HEART RATE: 68 BPM | TEMPERATURE: 98 F | OXYGEN SATURATION: 99 % | BODY MASS INDEX: 32.91 KG/M2 | SYSTOLIC BLOOD PRESSURE: 130 MMHG | HEIGHT: 62 IN | WEIGHT: 178.81 LBS

## 2020-11-19 DIAGNOSIS — I10 ESSENTIAL HYPERTENSION: ICD-10-CM

## 2020-11-19 DIAGNOSIS — R05.8 ACE-INHIBITOR COUGH: ICD-10-CM

## 2020-11-19 DIAGNOSIS — Z00.00 ANNUAL PHYSICAL EXAM: Primary | ICD-10-CM

## 2020-11-19 DIAGNOSIS — T46.4X5A ACE-INHIBITOR COUGH: ICD-10-CM

## 2020-11-19 DIAGNOSIS — G56.03 BILATERAL CARPAL TUNNEL SYNDROME: ICD-10-CM

## 2020-11-19 DIAGNOSIS — F32.2 MAJOR DEPRESSIVE DISORDER, SINGLE EPISODE, SEVERE WITHOUT PSYCHOTIC FEATURES: ICD-10-CM

## 2020-11-19 DIAGNOSIS — E78.5 HYPERLIPIDEMIA, UNSPECIFIED HYPERLIPIDEMIA TYPE: ICD-10-CM

## 2020-11-19 PROCEDURE — 99999 PR PBB SHADOW E&M-EST. PATIENT-LVL IV: CPT | Mod: PBBFAC,,, | Performed by: FAMILY MEDICINE

## 2020-11-19 PROCEDURE — 99999 PR PBB SHADOW E&M-EST. PATIENT-LVL IV: ICD-10-PCS | Mod: PBBFAC,,, | Performed by: FAMILY MEDICINE

## 2020-11-19 PROCEDURE — 3078F DIAST BP <80 MM HG: CPT | Mod: CPTII,S$GLB,, | Performed by: FAMILY MEDICINE

## 2020-11-19 PROCEDURE — 1126F PR PAIN SEVERITY QUANTIFIED, NO PAIN PRESENT: ICD-10-PCS | Mod: S$GLB,,, | Performed by: FAMILY MEDICINE

## 2020-11-19 PROCEDURE — 3078F PR MOST RECENT DIASTOLIC BLOOD PRESSURE < 80 MM HG: ICD-10-PCS | Mod: CPTII,S$GLB,, | Performed by: FAMILY MEDICINE

## 2020-11-19 PROCEDURE — 99499 UNLISTED E&M SERVICE: CPT | Mod: S$GLB,,, | Performed by: FAMILY MEDICINE

## 2020-11-19 PROCEDURE — 3008F BODY MASS INDEX DOCD: CPT | Mod: CPTII,S$GLB,, | Performed by: FAMILY MEDICINE

## 2020-11-19 PROCEDURE — 3288F FALL RISK ASSESSMENT DOCD: CPT | Mod: CPTII,S$GLB,, | Performed by: FAMILY MEDICINE

## 2020-11-19 PROCEDURE — 1101F PR PT FALLS ASSESS DOC 0-1 FALLS W/OUT INJ PAST YR: ICD-10-PCS | Mod: CPTII,S$GLB,, | Performed by: FAMILY MEDICINE

## 2020-11-19 PROCEDURE — 90694 FLU VACCINE - QUADRIVALENT - ADJUVANTED: ICD-10-PCS | Mod: S$GLB,,, | Performed by: FAMILY MEDICINE

## 2020-11-19 PROCEDURE — 1126F AMNT PAIN NOTED NONE PRSNT: CPT | Mod: S$GLB,,, | Performed by: FAMILY MEDICINE

## 2020-11-19 PROCEDURE — 90694 VACC AIIV4 NO PRSRV 0.5ML IM: CPT | Mod: S$GLB,,, | Performed by: FAMILY MEDICINE

## 2020-11-19 PROCEDURE — 99214 PR OFFICE/OUTPT VISIT, EST, LEVL IV, 30-39 MIN: ICD-10-PCS | Mod: 25,S$GLB,, | Performed by: FAMILY MEDICINE

## 2020-11-19 PROCEDURE — G0008 ADMIN INFLUENZA VIRUS VAC: HCPCS | Mod: S$GLB,,, | Performed by: FAMILY MEDICINE

## 2020-11-19 PROCEDURE — 3075F SYST BP GE 130 - 139MM HG: CPT | Mod: CPTII,S$GLB,, | Performed by: FAMILY MEDICINE

## 2020-11-19 PROCEDURE — 99214 OFFICE O/P EST MOD 30 MIN: CPT | Mod: 25,S$GLB,, | Performed by: FAMILY MEDICINE

## 2020-11-19 PROCEDURE — 3008F PR BODY MASS INDEX (BMI) DOCUMENTED: ICD-10-PCS | Mod: CPTII,S$GLB,, | Performed by: FAMILY MEDICINE

## 2020-11-19 PROCEDURE — 3288F PR FALLS RISK ASSESSMENT DOCUMENTED: ICD-10-PCS | Mod: CPTII,S$GLB,, | Performed by: FAMILY MEDICINE

## 2020-11-19 PROCEDURE — 3075F PR MOST RECENT SYSTOLIC BLOOD PRESS GE 130-139MM HG: ICD-10-PCS | Mod: CPTII,S$GLB,, | Performed by: FAMILY MEDICINE

## 2020-11-19 PROCEDURE — G0008 FLU VACCINE - QUADRIVALENT - ADJUVANTED: ICD-10-PCS | Mod: S$GLB,,, | Performed by: FAMILY MEDICINE

## 2020-11-19 PROCEDURE — 99499 RISK ADDL DX/OHS AUDIT: ICD-10-PCS | Mod: S$GLB,,, | Performed by: FAMILY MEDICINE

## 2020-11-19 PROCEDURE — 1101F PT FALLS ASSESS-DOCD LE1/YR: CPT | Mod: CPTII,S$GLB,, | Performed by: FAMILY MEDICINE

## 2020-11-19 RX ORDER — IRBESARTAN AND HYDROCHLOROTHIAZIDE 150; 12.5 MG/1; MG/1
1 TABLET, FILM COATED ORAL DAILY
Qty: 90 TABLET | Refills: 3 | Status: SHIPPED | OUTPATIENT
Start: 2020-11-19 | End: 2021-10-19

## 2020-11-19 RX ORDER — ESCITALOPRAM OXALATE 10 MG/1
TABLET ORAL
Qty: 90 TABLET | Refills: 3 | Status: SHIPPED | OUTPATIENT
Start: 2020-11-19 | End: 2021-03-03 | Stop reason: DRUGHIGH

## 2020-11-19 RX ORDER — METOPROLOL SUCCINATE 25 MG/1
TABLET, EXTENDED RELEASE ORAL
Qty: 90 TABLET | Refills: 3 | Status: SHIPPED | OUTPATIENT
Start: 2020-11-19 | End: 2021-11-16

## 2020-11-19 RX ORDER — AMLODIPINE BESYLATE 5 MG/1
TABLET ORAL
Qty: 90 TABLET | Refills: 3 | Status: SHIPPED | OUTPATIENT
Start: 2020-11-19 | End: 2021-11-29 | Stop reason: SDUPTHER

## 2020-11-19 RX ORDER — CELECOXIB 100 MG/1
100 CAPSULE ORAL DAILY
Qty: 90 CAPSULE | Refills: 3 | Status: SHIPPED | OUTPATIENT
Start: 2020-11-19 | End: 2021-05-04 | Stop reason: SDUPTHER

## 2020-11-19 RX ORDER — SIMVASTATIN 20 MG/1
20 TABLET, FILM COATED ORAL NIGHTLY
Qty: 90 TABLET | Refills: 3 | Status: SHIPPED | OUTPATIENT
Start: 2020-11-19 | End: 2021-01-12

## 2020-11-19 NOTE — PROGRESS NOTES
Assessment & Plan  Problem List Items Addressed This Visit        Psychiatric    Major depressive disorder, single episode, severe without psychotic features    Relevant Medications    amLODIPine (NORVASC) 5 MG tablet    escitalopram oxalate (LEXAPRO) 10 MG tablet       Cardiac/Vascular    Hyperlipidemia    Relevant Medications    simvastatin (ZOCOR) 20 MG tablet    Essential hypertension    Relevant Medications    irbesartan-hydrochlorothiazide (AVALIDE) 150-12.5 mg per tablet    metoprolol succinate (TOPROL-XL) 25 MG 24 hr tablet      Other Visit Diagnoses     Annual physical exam    -  Primary    Bilateral carpal tunnel syndrome        Relevant Medications    celecoxib (CELEBREX) 100 MG capsule    ACE-inhibitor cough        Relevant Medications    irbesartan-hydrochlorothiazide (AVALIDE) 150-12.5 mg per tablet            Health Maintenance reviewed.    Follow-up: No follow-ups on file.    ______________________________________________________________________    Chief Complaint  Chief Complaint   Patient presents with    Annual Exam       HPI  Rosa Isela Pate is a 70 y.o. female with multiple medical diagnoses as listed in the medical history and problem list that presents for annual exam.  Pt is known to me with last appointment 12/12/2019.    Patient denies any new symptoms including chest pain, SOB, blurry vision, N/V, diarrhea.        PAST MEDICAL HISTORY:  Past Medical History:   Diagnosis Date    Depression     Hyperlipidemia     Hypertension     Obesity     Tobacco dependence        PAST SURGICAL HISTORY:  Past Surgical History:   Procedure Laterality Date    CHOLECYSTECTOMY      removal    COLONOSCOPY N/A 11/1/2017    Procedure: COLONOSCOPY;  Surgeon: Francisco Javier Bai MD;  Location: [facility];  Service: Endoscopy;  Laterality: N/A;    HYSTERECTOMY      MYOMECTOMY      removal    OOPHORECTOMY      TUBAL LIGATION         SOCIAL HISTORY:  Social History     Socioeconomic History    Marital  status:      Spouse name: Not on file    Number of children: Not on file    Years of education: Not on file    Highest education level: Not on file   Occupational History    Not on file   Social Needs    Financial resource strain: Somewhat hard    Food insecurity     Worry: Sometimes true     Inability: Never true    Transportation needs     Medical: No     Non-medical: No   Tobacco Use    Smoking status: Current Every Day Smoker    Smokeless tobacco: Never Used   Substance and Sexual Activity    Alcohol use: Yes     Alcohol/week: 0.0 standard drinks     Frequency: Monthly or less     Drinks per session: Patient refused     Binge frequency: Never    Drug use: No    Sexual activity: Yes     Partners: Male   Lifestyle    Physical activity     Days per week: 0 days     Minutes per session: 0 min    Stress: To some extent   Relationships    Social connections     Talks on phone: Not on file     Gets together: Not on file     Attends Confucianist service: Not on file     Active member of club or organization: Not on file     Attends meetings of clubs or organizations: Not on file     Relationship status: Not on file   Other Topics Concern    Not on file   Social History Narrative    Not on file       FAMILY HISTORY:  Family History   Problem Relation Age of Onset    Hypertension Mother     Heart disease Father        ALLERGIES AND MEDICATIONS: updated and reviewed.  Review of patient's allergies indicates:   Allergen Reactions    Enalapril Other (See Comments)     Ace cough     Current Outpatient Medications   Medication Sig Dispense Refill    amLODIPine (NORVASC) 5 MG tablet TAKE 1 TABLET BY MOUTH ONCE DAILY APPOINTMENT NEEDED  WITH DR. CORREA FOR  FUTURE REFILLS 90 tablet 3    celecoxib (CELEBREX) 100 MG capsule Take 1 capsule (100 mg total) by mouth once daily. 90 capsule 3    escitalopram oxalate (LEXAPRO) 10 MG tablet TAKE 1 TABLET EVERY DAY. 90 tablet 3     "irbesartan-hydrochlorothiazide (AVALIDE) 150-12.5 mg per tablet Take 1 tablet by mouth once daily. 90 tablet 3    meclizine (ANTIVERT) 25 mg tablet Take 1 tablet (25 mg total) by mouth 3 (three) times daily as needed for Dizziness. 30 tablet 0    metoprolol succinate (TOPROL-XL) 25 MG 24 hr tablet TAKE 1 TABLET EVERY DAY. 90 tablet 3    multivitamin with minerals tablet       simvastatin (ZOCOR) 20 MG tablet Take 1 tablet (20 mg total) by mouth every evening. 90 tablet 3     No current facility-administered medications for this visit.          ROS  Review of Systems   Constitutional: Negative for activity change and unexpected weight change.   HENT: Negative for hearing loss, rhinorrhea and trouble swallowing.    Eyes: Negative for discharge and visual disturbance.   Respiratory: Positive for chest tightness and wheezing.    Cardiovascular: Positive for chest pain. Negative for palpitations.   Gastrointestinal: Negative for blood in stool, constipation, diarrhea and vomiting.   Endocrine: Negative for polydipsia and polyuria.   Genitourinary: Negative for difficulty urinating, dysuria, hematuria and menstrual problem.   Musculoskeletal: Negative for arthralgias, joint swelling and neck pain.   Neurological: Negative for weakness and headaches.   Psychiatric/Behavioral: Positive for dysphoric mood. Negative for confusion.         Physical Exam  Vitals:    11/19/20 0906   BP: (!) 140/80   BP Location: Left arm   Patient Position: Sitting   BP Method: Large (Manual)   Pulse: 68   Temp: 98.2 °F (36.8 °C)   TempSrc: Oral   SpO2: 99%   Weight: 81.1 kg (178 lb 12.7 oz)   Height: 5' 2" (1.575 m)    Body mass index is 32.7 kg/m².  Weight: 81.1 kg (178 lb 12.7 oz)   Height: 5' 2" (157.5 cm)   Physical Exam  Vitals signs reviewed.   Constitutional:       Appearance: She is well-developed.   HENT:      Head: Normocephalic and atraumatic.      Right Ear: External ear normal.      Left Ear: External ear normal.      Nose: " Nose normal.   Eyes:      Conjunctiva/sclera: Conjunctivae normal.      Pupils: Pupils are equal, round, and reactive to light.   Cardiovascular:      Rate and Rhythm: Normal rate and regular rhythm.      Heart sounds: Normal heart sounds.   Pulmonary:      Effort: Pulmonary effort is normal.      Breath sounds: Normal breath sounds.   Skin:     General: Skin is warm and dry.   Neurological:      Mental Status: She is alert and oriented to person, place, and time.         Health Maintenance       Date Due Completion Date    TETANUS VACCINE 07/12/1968 ---    DEXA SCAN 07/12/1990 ---    Shingles Vaccine (1 of 2) 07/12/2000 ---    Colorectal Cancer Screening 11/01/2018 11/1/2017    Pneumococcal Vaccine (65+ Low/Medium Risk) (2 of 2 - PPSV23) 02/26/2020 2/26/2019    Mammogram 04/19/2020 4/19/2018    Influenza Vaccine (1) 08/01/2020 2/26/2019    Lipid Panel 09/12/2023 9/12/2018              Patient note was created using [employer].  Any errors in syntax or even information may not have been identified and edited on initial review prior to signing this note.

## 2020-12-23 ENCOUNTER — TELEPHONE (OUTPATIENT)
Dept: FAMILY MEDICINE | Facility: CLINIC | Age: 70
End: 2020-12-23

## 2020-12-23 NOTE — TELEPHONE ENCOUNTER
----- Message from Nafisa Hoskins sent at 12/23/2020 12:21 PM CST -----  Regarding: Advice  Contact: Jas  Type: Patient Call Back    Who called:Jas    What is the request in detail:Patient is admitted to [facility] and wants to speak with nurse. Please call to advise    Can the clinic reply by [facility]?    Would the patient rather a call back or a response via [facility]? Call    Best call back number:[phone]

## 2020-12-23 NOTE — TELEPHONE ENCOUNTER
Spoke with daughter and she said that it's a possible that her mother might have had a stroke. The patient was limping and was having trouble remembering things. She was admitted to the hospital. Left message for patient to call back.to discuss her health

## 2020-12-24 ENCOUNTER — PATIENT OUTREACH (OUTPATIENT)
Dept: ADMINISTRATIVE | Facility: CLINIC | Age: 70
End: 2020-12-24

## 2020-12-24 ENCOUNTER — EXTERNAL HOSPITAL ADMISSION (OUTPATIENT)
Dept: ADMINISTRATIVE | Facility: CLINIC | Age: 70
End: 2020-12-24

## 2020-12-24 DIAGNOSIS — I63.81 LACUNAR INFARCTION: Primary | ICD-10-CM

## 2020-12-24 NOTE — PATIENT INSTRUCTIONS
Symptoms of a Stroke     A sudden feeling of weakness on one side of your body may be a sign that you are having a stroke.   During a stroke, blood stops flowing to part of the brain. This can damage areas in the brain that control the rest of the body. Get help right away if any of these symptoms come on suddenly, even if the symptoms dont last.  Know the symptoms of a stroke  · Weakness. You may feel a sudden weakness, tingling, or a loss of feeling on 1 side of your face or body including your arm or leg.   · Vision problems. You may have sudden double vision or trouble seeing in 1 or both eyes.  · Speech problems. You may have sudden trouble talking, slurred speech, or problems understanding others.  · Headache. You may have a sudden, severe headache.  · Movement problems. You may have sudden trouble walking, dizziness, a feeling of spinning, a loss of balance, a feeling of falling, or blackouts.  · Seizure. You may also have a seizure with a large or hemorrhagic stroke.   Remember: If you have any of these symptoms, call 911 and your doctor as soon as possible.  F.A.S.T. is an easy way to remember the signs of a stroke. When you see these signs, you will know that you need to call 911 fast.   F.A.S.T. stands for:  · F is for face drooping - One side of the face is drooping or numb. When the person smiles, the smile is uneven.  · A is for arm weakness - One arm is weak or numb. When the person lifts both arms at the same time, one arm may drift downward.  · S is for speech difficulty - You may notice slurred speech or difficulty speaking. The person can't repeat a simple sentence correctly when asked.  · T is for time to dial 911 - If someone shows any of these symptoms, even if they go away, call 911 immediately. Make note of the time the symptoms first appeared.   Date Last Reviewed: 8/26/2015 © 2000-2020 "[employer]. [address] [place] [address], [place] [address]. All rights reserved. This  information is not intended as a substitute for professional medical care. Always follow your healthcare professional's instructions.

## 2020-12-30 DIAGNOSIS — I63.81 LACUNAR INFARCTION: Primary | ICD-10-CM

## 2021-01-04 PROBLEM — Z78.9 DEFICIT IN ACTIVITIES OF DAILY LIVING (ADL): Status: ACTIVE | Noted: 2021-01-04

## 2021-01-04 PROBLEM — R53.1 RIGHT SIDED WEAKNESS: Status: ACTIVE | Noted: 2021-01-04

## 2021-01-12 ENCOUNTER — OFFICE VISIT (OUTPATIENT)
Dept: FAMILY MEDICINE | Facility: CLINIC | Age: 71
End: 2021-01-12
Payer: MEDICARE

## 2021-01-12 ENCOUNTER — IMMUNIZATION (OUTPATIENT)
Dept: PHARMACY | Facility: CLINIC | Age: 71
End: 2021-01-12

## 2021-01-12 VITALS
HEIGHT: 62 IN | TEMPERATURE: 99 F | DIASTOLIC BLOOD PRESSURE: 70 MMHG | SYSTOLIC BLOOD PRESSURE: 126 MMHG | BODY MASS INDEX: 32.91 KG/M2 | OXYGEN SATURATION: 95 % | HEART RATE: 60 BPM | WEIGHT: 178.81 LBS

## 2021-01-12 DIAGNOSIS — R39.81 FUNCTIONAL URINARY INCONTINENCE: ICD-10-CM

## 2021-01-12 DIAGNOSIS — I63.81 LACUNAR INFARCTION: Primary | ICD-10-CM

## 2021-01-12 DIAGNOSIS — F43.22 ADJUSTMENT DISORDER WITH ANXIOUS MOOD: ICD-10-CM

## 2021-01-12 DIAGNOSIS — Z23 NEED FOR VACCINATION: ICD-10-CM

## 2021-01-12 DIAGNOSIS — F32.2 MAJOR DEPRESSIVE DISORDER, SINGLE EPISODE, SEVERE WITHOUT PSYCHOTIC FEATURES: ICD-10-CM

## 2021-01-12 PROCEDURE — 99215 OFFICE O/P EST HI 40 MIN: CPT | Mod: S$GLB,,, | Performed by: FAMILY MEDICINE

## 2021-01-12 PROCEDURE — 3008F BODY MASS INDEX DOCD: CPT | Mod: CPTII,S$GLB,, | Performed by: FAMILY MEDICINE

## 2021-01-12 PROCEDURE — 1126F PR PAIN SEVERITY QUANTIFIED, NO PAIN PRESENT: ICD-10-PCS | Mod: S$GLB,,, | Performed by: FAMILY MEDICINE

## 2021-01-12 PROCEDURE — 99215 PR OFFICE/OUTPT VISIT, EST, LEVL V, 40-54 MIN: ICD-10-PCS | Mod: S$GLB,,, | Performed by: FAMILY MEDICINE

## 2021-01-12 PROCEDURE — 3078F DIAST BP <80 MM HG: CPT | Mod: CPTII,S$GLB,, | Performed by: FAMILY MEDICINE

## 2021-01-12 PROCEDURE — 1159F MED LIST DOCD IN RCRD: CPT | Mod: S$GLB,,, | Performed by: FAMILY MEDICINE

## 2021-01-12 PROCEDURE — 1159F PR MEDICATION LIST DOCUMENTED IN MEDICAL RECORD: ICD-10-PCS | Mod: S$GLB,,, | Performed by: FAMILY MEDICINE

## 2021-01-12 PROCEDURE — 3074F PR MOST RECENT SYSTOLIC BLOOD PRESSURE < 130 MM HG: ICD-10-PCS | Mod: CPTII,S$GLB,, | Performed by: FAMILY MEDICINE

## 2021-01-12 PROCEDURE — 3008F PR BODY MASS INDEX (BMI) DOCUMENTED: ICD-10-PCS | Mod: CPTII,S$GLB,, | Performed by: FAMILY MEDICINE

## 2021-01-12 PROCEDURE — 1126F AMNT PAIN NOTED NONE PRSNT: CPT | Mod: S$GLB,,, | Performed by: FAMILY MEDICINE

## 2021-01-12 PROCEDURE — 3078F PR MOST RECENT DIASTOLIC BLOOD PRESSURE < 80 MM HG: ICD-10-PCS | Mod: CPTII,S$GLB,, | Performed by: FAMILY MEDICINE

## 2021-01-12 PROCEDURE — 1101F PT FALLS ASSESS-DOCD LE1/YR: CPT | Mod: CPTII,S$GLB,, | Performed by: FAMILY MEDICINE

## 2021-01-12 PROCEDURE — 1101F PR PT FALLS ASSESS DOC 0-1 FALLS W/OUT INJ PAST YR: ICD-10-PCS | Mod: CPTII,S$GLB,, | Performed by: FAMILY MEDICINE

## 2021-01-12 PROCEDURE — 99999 PR PBB SHADOW E&M-EST. PATIENT-LVL V: ICD-10-PCS | Mod: PBBFAC,,, | Performed by: FAMILY MEDICINE

## 2021-01-12 PROCEDURE — 99999 PR PBB SHADOW E&M-EST. PATIENT-LVL V: CPT | Mod: PBBFAC,,, | Performed by: FAMILY MEDICINE

## 2021-01-12 PROCEDURE — 3288F PR FALLS RISK ASSESSMENT DOCUMENTED: ICD-10-PCS | Mod: CPTII,S$GLB,, | Performed by: FAMILY MEDICINE

## 2021-01-12 PROCEDURE — 3074F SYST BP LT 130 MM HG: CPT | Mod: CPTII,S$GLB,, | Performed by: FAMILY MEDICINE

## 2021-01-12 PROCEDURE — 3288F FALL RISK ASSESSMENT DOCD: CPT | Mod: CPTII,S$GLB,, | Performed by: FAMILY MEDICINE

## 2021-01-14 ENCOUNTER — CLINICAL SUPPORT (OUTPATIENT)
Dept: REHABILITATION | Facility: HOSPITAL | Age: 71
End: 2021-01-14
Attending: FAMILY MEDICINE
Payer: MEDICARE

## 2021-01-14 DIAGNOSIS — R41.841 COGNITIVE COMMUNICATION DEFICIT: Primary | ICD-10-CM

## 2021-01-14 DIAGNOSIS — I63.81 LACUNAR INFARCTION: ICD-10-CM

## 2021-01-14 PROCEDURE — 92523 SPEECH SOUND LANG COMPREHEN: CPT | Mod: PN

## 2021-01-15 PROBLEM — R41.841 COGNITIVE COMMUNICATION DISORDER: Status: ACTIVE | Noted: 2021-01-15

## 2021-01-20 ENCOUNTER — CLINICAL SUPPORT (OUTPATIENT)
Dept: REHABILITATION | Facility: HOSPITAL | Age: 71
End: 2021-01-20
Attending: FAMILY MEDICINE
Payer: MEDICARE

## 2021-01-20 DIAGNOSIS — R41.841 COGNITIVE COMMUNICATION DISORDER: ICD-10-CM

## 2021-01-20 PROCEDURE — 97130 THER IVNTJ EA ADDL 15 MIN: CPT | Mod: PN | Performed by: SPEECH-LANGUAGE PATHOLOGIST

## 2021-01-20 PROCEDURE — 97129 THER IVNTJ 1ST 15 MIN: CPT | Mod: PN | Performed by: SPEECH-LANGUAGE PATHOLOGIST

## 2021-01-21 ENCOUNTER — DOCUMENTATION ONLY (OUTPATIENT)
Dept: REHABILITATION | Facility: HOSPITAL | Age: 71
End: 2021-01-21

## 2021-01-21 ENCOUNTER — CLINICAL SUPPORT (OUTPATIENT)
Dept: REHABILITATION | Facility: HOSPITAL | Age: 71
End: 2021-01-21
Attending: FAMILY MEDICINE
Payer: MEDICARE

## 2021-01-21 DIAGNOSIS — R53.1 RIGHT SIDED WEAKNESS: ICD-10-CM

## 2021-01-21 DIAGNOSIS — R29.898 DECREASED STRENGTH OF LOWER EXTREMITY: ICD-10-CM

## 2021-01-21 DIAGNOSIS — Z78.9 DEFICIT IN ACTIVITIES OF DAILY LIVING (ADL): ICD-10-CM

## 2021-01-21 DIAGNOSIS — I63.81 LACUNAR INFARCTION: ICD-10-CM

## 2021-01-21 DIAGNOSIS — R68.89 DECREASED ACTIVITY TOLERANCE: ICD-10-CM

## 2021-01-21 PROCEDURE — 97161 PT EVAL LOW COMPLEX 20 MIN: CPT | Mod: PN

## 2021-01-21 PROCEDURE — 97110 THERAPEUTIC EXERCISES: CPT | Mod: PN

## 2021-01-26 PROBLEM — R68.89 DECREASED ACTIVITY TOLERANCE: Status: ACTIVE | Noted: 2021-01-26

## 2021-01-26 PROBLEM — R29.898 DECREASED STRENGTH OF LOWER EXTREMITY: Status: ACTIVE | Noted: 2021-01-26

## 2021-01-27 ENCOUNTER — CLINICAL SUPPORT (OUTPATIENT)
Dept: REHABILITATION | Facility: HOSPITAL | Age: 71
End: 2021-01-27
Attending: FAMILY MEDICINE
Payer: MEDICARE

## 2021-01-27 DIAGNOSIS — R41.841 COGNITIVE COMMUNICATION DISORDER: Primary | ICD-10-CM

## 2021-01-27 PROCEDURE — 97129 THER IVNTJ 1ST 15 MIN: CPT | Mod: PN | Performed by: SPEECH-LANGUAGE PATHOLOGIST

## 2021-01-27 PROCEDURE — 97130 THER IVNTJ EA ADDL 15 MIN: CPT | Mod: PN | Performed by: SPEECH-LANGUAGE PATHOLOGIST

## 2021-01-29 ENCOUNTER — CLINICAL SUPPORT (OUTPATIENT)
Dept: REHABILITATION | Facility: HOSPITAL | Age: 71
End: 2021-01-29
Attending: FAMILY MEDICINE
Payer: MEDICARE

## 2021-01-29 DIAGNOSIS — R68.89 DECREASED ACTIVITY TOLERANCE: ICD-10-CM

## 2021-01-29 DIAGNOSIS — R29.898 DECREASED STRENGTH OF LOWER EXTREMITY: ICD-10-CM

## 2021-01-29 PROCEDURE — 97110 THERAPEUTIC EXERCISES: CPT | Mod: PN

## 2021-02-01 ENCOUNTER — PATIENT MESSAGE (OUTPATIENT)
Dept: FAMILY MEDICINE | Facility: CLINIC | Age: 71
End: 2021-02-01

## 2021-02-03 ENCOUNTER — CLINICAL SUPPORT (OUTPATIENT)
Dept: REHABILITATION | Facility: HOSPITAL | Age: 71
End: 2021-02-03
Attending: FAMILY MEDICINE
Payer: MEDICARE

## 2021-02-03 DIAGNOSIS — R41.841 COGNITIVE COMMUNICATION DISORDER: Primary | ICD-10-CM

## 2021-02-03 PROCEDURE — 97130 THER IVNTJ EA ADDL 15 MIN: CPT | Mod: PN | Performed by: SPEECH-LANGUAGE PATHOLOGIST

## 2021-02-03 PROCEDURE — 97129 THER IVNTJ 1ST 15 MIN: CPT | Mod: PN | Performed by: SPEECH-LANGUAGE PATHOLOGIST

## 2021-02-04 ENCOUNTER — PATIENT MESSAGE (OUTPATIENT)
Dept: FAMILY MEDICINE | Facility: CLINIC | Age: 71
End: 2021-02-04

## 2021-02-04 ENCOUNTER — PATIENT OUTREACH (OUTPATIENT)
Dept: ADMINISTRATIVE | Facility: HOSPITAL | Age: 71
End: 2021-02-04

## 2021-02-04 DIAGNOSIS — Z12.31 ENCOUNTER FOR SCREENING MAMMOGRAM FOR MALIGNANT NEOPLASM OF BREAST: Primary | ICD-10-CM

## 2021-02-04 DIAGNOSIS — N95.8 OTHER SPECIFIED MENOPAUSAL AND PERIMENOPAUSAL DISORDERS: ICD-10-CM

## 2021-02-04 RX ORDER — ATORVASTATIN CALCIUM 80 MG/1
80 TABLET, FILM COATED ORAL NIGHTLY
COMMUNITY
Start: 2021-01-19 | End: 2022-01-31 | Stop reason: SDUPTHER

## 2021-02-04 RX ORDER — NAPROXEN SODIUM 220 MG/1
81 TABLET, FILM COATED ORAL DAILY
COMMUNITY
Start: 2021-01-19 | End: 2022-01-31 | Stop reason: SDUPTHER

## 2021-02-05 ENCOUNTER — CLINICAL SUPPORT (OUTPATIENT)
Dept: REHABILITATION | Facility: HOSPITAL | Age: 71
End: 2021-02-05
Attending: FAMILY MEDICINE
Payer: MEDICARE

## 2021-02-05 DIAGNOSIS — R68.89 DECREASED ACTIVITY TOLERANCE: ICD-10-CM

## 2021-02-05 DIAGNOSIS — R29.898 DECREASED STRENGTH OF LOWER EXTREMITY: ICD-10-CM

## 2021-02-05 PROCEDURE — 97110 THERAPEUTIC EXERCISES: CPT | Mod: PN

## 2021-02-09 ENCOUNTER — IMMUNIZATION (OUTPATIENT)
Dept: PHARMACY | Facility: CLINIC | Age: 71
End: 2021-02-09

## 2021-02-09 DIAGNOSIS — Z23 NEED FOR VACCINATION: Primary | ICD-10-CM

## 2021-02-10 ENCOUNTER — CLINICAL SUPPORT (OUTPATIENT)
Dept: REHABILITATION | Facility: HOSPITAL | Age: 71
End: 2021-02-10
Attending: FAMILY MEDICINE
Payer: MEDICARE

## 2021-02-10 DIAGNOSIS — R68.89 DECREASED ACTIVITY TOLERANCE: ICD-10-CM

## 2021-02-10 DIAGNOSIS — R29.898 DECREASED STRENGTH OF LOWER EXTREMITY: ICD-10-CM

## 2021-02-10 DIAGNOSIS — R41.841 COGNITIVE COMMUNICATION DISORDER: Primary | ICD-10-CM

## 2021-02-10 PROCEDURE — 97110 THERAPEUTIC EXERCISES: CPT | Mod: PN

## 2021-02-10 PROCEDURE — 97130 THER IVNTJ EA ADDL 15 MIN: CPT | Mod: PN | Performed by: SPEECH-LANGUAGE PATHOLOGIST

## 2021-02-10 PROCEDURE — 97129 THER IVNTJ 1ST 15 MIN: CPT | Mod: PN | Performed by: SPEECH-LANGUAGE PATHOLOGIST

## 2021-02-18 ENCOUNTER — OFFICE VISIT (OUTPATIENT)
Dept: FAMILY MEDICINE | Facility: CLINIC | Age: 71
End: 2021-02-18
Payer: MEDICARE

## 2021-02-18 VITALS
BODY MASS INDEX: 32.14 KG/M2 | TEMPERATURE: 98 F | OXYGEN SATURATION: 98 % | WEIGHT: 174.63 LBS | HEIGHT: 62 IN | DIASTOLIC BLOOD PRESSURE: 60 MMHG | SYSTOLIC BLOOD PRESSURE: 130 MMHG | HEART RATE: 58 BPM

## 2021-02-18 DIAGNOSIS — F43.22 ADJUSTMENT DISORDER WITH ANXIOUS MOOD: ICD-10-CM

## 2021-02-18 DIAGNOSIS — I63.81 LACUNAR INFARCTION: ICD-10-CM

## 2021-02-18 DIAGNOSIS — F32.2 MAJOR DEPRESSIVE DISORDER, SINGLE EPISODE, SEVERE WITHOUT PSYCHOTIC FEATURES: Primary | ICD-10-CM

## 2021-02-18 PROCEDURE — 1159F MED LIST DOCD IN RCRD: CPT | Mod: S$GLB,,, | Performed by: FAMILY MEDICINE

## 2021-02-18 PROCEDURE — 99999 PR PBB SHADOW E&M-EST. PATIENT-LVL IV: CPT | Mod: PBBFAC,,, | Performed by: FAMILY MEDICINE

## 2021-02-18 PROCEDURE — 1126F PR PAIN SEVERITY QUANTIFIED, NO PAIN PRESENT: ICD-10-PCS | Mod: S$GLB,,, | Performed by: FAMILY MEDICINE

## 2021-02-18 PROCEDURE — 3288F FALL RISK ASSESSMENT DOCD: CPT | Mod: CPTII,S$GLB,, | Performed by: FAMILY MEDICINE

## 2021-02-18 PROCEDURE — 3078F DIAST BP <80 MM HG: CPT | Mod: CPTII,S$GLB,, | Performed by: FAMILY MEDICINE

## 2021-02-18 PROCEDURE — 3288F PR FALLS RISK ASSESSMENT DOCUMENTED: ICD-10-PCS | Mod: CPTII,S$GLB,, | Performed by: FAMILY MEDICINE

## 2021-02-18 PROCEDURE — 99215 OFFICE O/P EST HI 40 MIN: CPT | Mod: S$GLB,,, | Performed by: FAMILY MEDICINE

## 2021-02-18 PROCEDURE — 3075F SYST BP GE 130 - 139MM HG: CPT | Mod: CPTII,S$GLB,, | Performed by: FAMILY MEDICINE

## 2021-02-18 PROCEDURE — 1159F PR MEDICATION LIST DOCUMENTED IN MEDICAL RECORD: ICD-10-PCS | Mod: S$GLB,,, | Performed by: FAMILY MEDICINE

## 2021-02-18 PROCEDURE — 3008F PR BODY MASS INDEX (BMI) DOCUMENTED: ICD-10-PCS | Mod: CPTII,S$GLB,, | Performed by: FAMILY MEDICINE

## 2021-02-18 PROCEDURE — 99999 PR PBB SHADOW E&M-EST. PATIENT-LVL IV: ICD-10-PCS | Mod: PBBFAC,,, | Performed by: FAMILY MEDICINE

## 2021-02-18 PROCEDURE — 1101F PT FALLS ASSESS-DOCD LE1/YR: CPT | Mod: CPTII,S$GLB,, | Performed by: FAMILY MEDICINE

## 2021-02-18 PROCEDURE — 3078F PR MOST RECENT DIASTOLIC BLOOD PRESSURE < 80 MM HG: ICD-10-PCS | Mod: CPTII,S$GLB,, | Performed by: FAMILY MEDICINE

## 2021-02-18 PROCEDURE — 99215 PR OFFICE/OUTPT VISIT, EST, LEVL V, 40-54 MIN: ICD-10-PCS | Mod: S$GLB,,, | Performed by: FAMILY MEDICINE

## 2021-02-18 PROCEDURE — 1126F AMNT PAIN NOTED NONE PRSNT: CPT | Mod: S$GLB,,, | Performed by: FAMILY MEDICINE

## 2021-02-18 PROCEDURE — 3008F BODY MASS INDEX DOCD: CPT | Mod: CPTII,S$GLB,, | Performed by: FAMILY MEDICINE

## 2021-02-18 PROCEDURE — 3075F PR MOST RECENT SYSTOLIC BLOOD PRESS GE 130-139MM HG: ICD-10-PCS | Mod: CPTII,S$GLB,, | Performed by: FAMILY MEDICINE

## 2021-02-18 PROCEDURE — 1101F PR PT FALLS ASSESS DOC 0-1 FALLS W/OUT INJ PAST YR: ICD-10-PCS | Mod: CPTII,S$GLB,, | Performed by: FAMILY MEDICINE

## 2021-02-18 RX ORDER — HYDROXYZINE HYDROCHLORIDE 50 MG/1
50 TABLET, FILM COATED ORAL 3 TIMES DAILY PRN
Qty: 30 TABLET | Refills: 2 | Status: SHIPPED | OUTPATIENT
Start: 2021-02-18 | End: 2021-04-14 | Stop reason: SDUPTHER

## 2021-02-19 ENCOUNTER — HOSPITAL ENCOUNTER (OUTPATIENT)
Dept: RADIOLOGY | Facility: HOSPITAL | Age: 71
Discharge: HOME | End: 2021-02-19
Attending: FAMILY MEDICINE
Payer: MEDICARE

## 2021-02-19 DIAGNOSIS — Z12.31 ENCOUNTER FOR SCREENING MAMMOGRAM FOR MALIGNANT NEOPLASM OF BREAST: ICD-10-CM

## 2021-02-19 PROCEDURE — 77063 MAMMO DIGITAL SCREENING BILAT WITH TOMO: ICD-10-PCS | Mod: 26,,, | Performed by: RADIOLOGY

## 2021-02-19 PROCEDURE — 77067 SCR MAMMO BI INCL CAD: CPT | Mod: TC,PO

## 2021-02-19 PROCEDURE — 77067 MAMMO DIGITAL SCREENING BILAT WITH TOMO: ICD-10-PCS | Mod: 26,,, | Performed by: RADIOLOGY

## 2021-02-19 PROCEDURE — 77063 BREAST TOMOSYNTHESIS BI: CPT | Mod: 26,,, | Performed by: RADIOLOGY

## 2021-02-19 PROCEDURE — 77067 SCR MAMMO BI INCL CAD: CPT | Mod: 26,,, | Performed by: RADIOLOGY

## 2021-02-24 ENCOUNTER — CLINICAL SUPPORT (OUTPATIENT)
Dept: REHABILITATION | Facility: HOSPITAL | Age: 71
End: 2021-02-24
Attending: FAMILY MEDICINE
Payer: MEDICARE

## 2021-02-24 DIAGNOSIS — R41.841 COGNITIVE COMMUNICATION DISORDER: Primary | ICD-10-CM

## 2021-02-24 PROCEDURE — 97129 THER IVNTJ 1ST 15 MIN: CPT | Mod: PN | Performed by: SPEECH-LANGUAGE PATHOLOGIST

## 2021-02-24 PROCEDURE — 97130 THER IVNTJ EA ADDL 15 MIN: CPT | Mod: PN | Performed by: SPEECH-LANGUAGE PATHOLOGIST

## 2021-03-03 ENCOUNTER — CLINICAL SUPPORT (OUTPATIENT)
Dept: REHABILITATION | Facility: HOSPITAL | Age: 71
End: 2021-03-03
Attending: FAMILY MEDICINE
Payer: MEDICARE

## 2021-03-03 ENCOUNTER — OFFICE VISIT (OUTPATIENT)
Dept: PSYCHIATRY | Facility: CLINIC | Age: 71
End: 2021-03-03
Payer: COMMERCIAL

## 2021-03-03 DIAGNOSIS — F43.22 ADJUSTMENT DISORDER WITH ANXIOUS MOOD: ICD-10-CM

## 2021-03-03 DIAGNOSIS — R68.89 DECREASED ACTIVITY TOLERANCE: ICD-10-CM

## 2021-03-03 DIAGNOSIS — R29.898 DECREASED STRENGTH OF LOWER EXTREMITY: ICD-10-CM

## 2021-03-03 DIAGNOSIS — R41.841 COGNITIVE COMMUNICATION DISORDER: Primary | ICD-10-CM

## 2021-03-03 DIAGNOSIS — F32.2 MAJOR DEPRESSIVE DISORDER, SINGLE EPISODE, SEVERE WITHOUT PSYCHOTIC FEATURES: ICD-10-CM

## 2021-03-03 PROCEDURE — 90792 PSYCH DIAG EVAL W/MED SRVCS: CPT | Mod: 95,,, | Performed by: PSYCHIATRY & NEUROLOGY

## 2021-03-03 PROCEDURE — 90792 PR PSYCHIATRIC DIAGNOSTIC EVALUATION W/MEDICAL SERVICES: ICD-10-PCS | Mod: 95,,, | Performed by: PSYCHIATRY & NEUROLOGY

## 2021-03-03 PROCEDURE — 97110 THERAPEUTIC EXERCISES: CPT | Mod: PN

## 2021-03-03 PROCEDURE — 97130 THER IVNTJ EA ADDL 15 MIN: CPT | Mod: PN | Performed by: SPEECH-LANGUAGE PATHOLOGIST

## 2021-03-03 PROCEDURE — 97129 THER IVNTJ 1ST 15 MIN: CPT | Mod: PN | Performed by: SPEECH-LANGUAGE PATHOLOGIST

## 2021-03-03 RX ORDER — ESCITALOPRAM OXALATE 10 MG/1
15 TABLET ORAL DAILY
Qty: 45 TABLET | Refills: 5 | Status: SHIPPED | OUTPATIENT
Start: 2021-03-03 | End: 2021-09-14

## 2021-03-08 ENCOUNTER — CLINICAL SUPPORT (OUTPATIENT)
Dept: REHABILITATION | Facility: HOSPITAL | Age: 71
End: 2021-03-08
Attending: FAMILY MEDICINE
Payer: MEDICARE

## 2021-03-08 DIAGNOSIS — R41.841 COGNITIVE COMMUNICATION DISORDER: Primary | ICD-10-CM

## 2021-03-08 PROCEDURE — 97130 THER IVNTJ EA ADDL 15 MIN: CPT | Mod: PN | Performed by: SPEECH-LANGUAGE PATHOLOGIST

## 2021-03-08 PROCEDURE — 97129 THER IVNTJ 1ST 15 MIN: CPT | Mod: PN | Performed by: SPEECH-LANGUAGE PATHOLOGIST

## 2021-03-10 DIAGNOSIS — I10 ESSENTIAL HYPERTENSION: ICD-10-CM

## 2021-03-14 ENCOUNTER — PATIENT MESSAGE (OUTPATIENT)
Dept: FAMILY MEDICINE | Facility: CLINIC | Age: 71
End: 2021-03-14

## 2021-03-14 DIAGNOSIS — R42 VERTIGO: ICD-10-CM

## 2021-03-15 ENCOUNTER — PATIENT MESSAGE (OUTPATIENT)
Dept: FAMILY MEDICINE | Facility: CLINIC | Age: 71
End: 2021-03-15

## 2021-03-15 RX ORDER — MECLIZINE HYDROCHLORIDE 25 MG/1
25 TABLET ORAL 3 TIMES DAILY PRN
Qty: 30 TABLET | Refills: 0 | Status: SHIPPED | OUTPATIENT
Start: 2021-03-15 | End: 2021-04-14 | Stop reason: SDUPTHER

## 2021-03-19 ENCOUNTER — PATIENT OUTREACH (OUTPATIENT)
Dept: ADMINISTRATIVE | Facility: HOSPITAL | Age: 71
End: 2021-03-19

## 2021-03-24 DIAGNOSIS — I10 ESSENTIAL HYPERTENSION: ICD-10-CM

## 2021-03-29 ENCOUNTER — HOSPITAL ENCOUNTER (OUTPATIENT)
Dept: RADIOLOGY | Facility: CLINIC | Age: 71
Discharge: HOME | End: 2021-03-29
Attending: FAMILY MEDICINE
Payer: MEDICARE

## 2021-03-29 DIAGNOSIS — N95.8 OTHER SPECIFIED MENOPAUSAL AND PERIMENOPAUSAL DISORDERS: ICD-10-CM

## 2021-03-29 PROCEDURE — 77080 DXA BONE DENSITY AXIAL: CPT | Mod: 26,,, | Performed by: INTERNAL MEDICINE

## 2021-03-29 PROCEDURE — 77080 DXA BONE DENSITY AXIAL: CPT | Mod: TC,PO

## 2021-03-29 PROCEDURE — 77080 DEXA BONE DENSITY SPINE HIP: ICD-10-PCS | Mod: 26,,, | Performed by: INTERNAL MEDICINE

## 2021-03-31 DIAGNOSIS — I10 ESSENTIAL HYPERTENSION: ICD-10-CM

## 2021-04-01 ENCOUNTER — OFFICE VISIT (OUTPATIENT)
Dept: FAMILY MEDICINE | Facility: CLINIC | Age: 71
End: 2021-04-01
Payer: MEDICARE

## 2021-04-01 VITALS
OXYGEN SATURATION: 97 % | SYSTOLIC BLOOD PRESSURE: 120 MMHG | BODY MASS INDEX: 32.57 KG/M2 | DIASTOLIC BLOOD PRESSURE: 80 MMHG | HEIGHT: 62 IN | TEMPERATURE: 99 F | HEART RATE: 64 BPM | WEIGHT: 177 LBS

## 2021-04-01 DIAGNOSIS — H53.8 BLURRY VISION: ICD-10-CM

## 2021-04-01 DIAGNOSIS — F51.01 PRIMARY INSOMNIA: Primary | ICD-10-CM

## 2021-04-01 DIAGNOSIS — H91.90 HEARING LOSS, UNSPECIFIED HEARING LOSS TYPE, UNSPECIFIED LATERALITY: ICD-10-CM

## 2021-04-01 PROCEDURE — 1159F MED LIST DOCD IN RCRD: CPT | Mod: S$GLB,,, | Performed by: FAMILY MEDICINE

## 2021-04-01 PROCEDURE — 1159F PR MEDICATION LIST DOCUMENTED IN MEDICAL RECORD: ICD-10-PCS | Mod: S$GLB,,, | Performed by: FAMILY MEDICINE

## 2021-04-01 PROCEDURE — 99999 PR PBB SHADOW E&M-EST. PATIENT-LVL V: CPT | Mod: PBBFAC,,, | Performed by: FAMILY MEDICINE

## 2021-04-01 PROCEDURE — 3074F PR MOST RECENT SYSTOLIC BLOOD PRESSURE < 130 MM HG: ICD-10-PCS | Mod: CPTII,S$GLB,, | Performed by: FAMILY MEDICINE

## 2021-04-01 PROCEDURE — 3079F PR MOST RECENT DIASTOLIC BLOOD PRESSURE 80-89 MM HG: ICD-10-PCS | Mod: CPTII,S$GLB,, | Performed by: FAMILY MEDICINE

## 2021-04-01 PROCEDURE — 3288F FALL RISK ASSESSMENT DOCD: CPT | Mod: CPTII,S$GLB,, | Performed by: FAMILY MEDICINE

## 2021-04-01 PROCEDURE — 3074F SYST BP LT 130 MM HG: CPT | Mod: CPTII,S$GLB,, | Performed by: FAMILY MEDICINE

## 2021-04-01 PROCEDURE — 1101F PR PT FALLS ASSESS DOC 0-1 FALLS W/OUT INJ PAST YR: ICD-10-PCS | Mod: CPTII,S$GLB,, | Performed by: FAMILY MEDICINE

## 2021-04-01 PROCEDURE — 1126F AMNT PAIN NOTED NONE PRSNT: CPT | Mod: S$GLB,,, | Performed by: FAMILY MEDICINE

## 2021-04-01 PROCEDURE — 3008F PR BODY MASS INDEX (BMI) DOCUMENTED: ICD-10-PCS | Mod: CPTII,S$GLB,, | Performed by: FAMILY MEDICINE

## 2021-04-01 PROCEDURE — 3008F BODY MASS INDEX DOCD: CPT | Mod: CPTII,S$GLB,, | Performed by: FAMILY MEDICINE

## 2021-04-01 PROCEDURE — 99999 PR PBB SHADOW E&M-EST. PATIENT-LVL V: ICD-10-PCS | Mod: PBBFAC,,, | Performed by: FAMILY MEDICINE

## 2021-04-01 PROCEDURE — 3079F DIAST BP 80-89 MM HG: CPT | Mod: CPTII,S$GLB,, | Performed by: FAMILY MEDICINE

## 2021-04-01 PROCEDURE — 99214 PR OFFICE/OUTPT VISIT, EST, LEVL IV, 30-39 MIN: ICD-10-PCS | Mod: S$GLB,,, | Performed by: FAMILY MEDICINE

## 2021-04-01 PROCEDURE — 1101F PT FALLS ASSESS-DOCD LE1/YR: CPT | Mod: CPTII,S$GLB,, | Performed by: FAMILY MEDICINE

## 2021-04-01 PROCEDURE — 99214 OFFICE O/P EST MOD 30 MIN: CPT | Mod: S$GLB,,, | Performed by: FAMILY MEDICINE

## 2021-04-01 PROCEDURE — 3288F PR FALLS RISK ASSESSMENT DOCUMENTED: ICD-10-PCS | Mod: CPTII,S$GLB,, | Performed by: FAMILY MEDICINE

## 2021-04-01 PROCEDURE — 1126F PR PAIN SEVERITY QUANTIFIED, NO PAIN PRESENT: ICD-10-PCS | Mod: S$GLB,,, | Performed by: FAMILY MEDICINE

## 2021-04-07 DIAGNOSIS — I10 ESSENTIAL HYPERTENSION: ICD-10-CM

## 2021-04-14 ENCOUNTER — PATIENT MESSAGE (OUTPATIENT)
Dept: FAMILY MEDICINE | Facility: CLINIC | Age: 71
End: 2021-04-14

## 2021-04-14 DIAGNOSIS — I10 ESSENTIAL HYPERTENSION: ICD-10-CM

## 2021-04-19 ENCOUNTER — OFFICE VISIT (OUTPATIENT)
Dept: NEUROLOGY | Facility: CLINIC | Age: 71
End: 2021-04-19
Payer: MEDICARE

## 2021-04-19 VITALS
SYSTOLIC BLOOD PRESSURE: 129 MMHG | BODY MASS INDEX: 32.17 KG/M2 | WEIGHT: 174.81 LBS | HEIGHT: 62 IN | DIASTOLIC BLOOD PRESSURE: 60 MMHG | HEART RATE: 55 BPM

## 2021-04-19 DIAGNOSIS — G47.00 INSOMNIA, UNSPECIFIED TYPE: ICD-10-CM

## 2021-04-19 DIAGNOSIS — I63.81 LACUNAR INFARCTION: Primary | ICD-10-CM

## 2021-04-19 DIAGNOSIS — E78.00 PURE HYPERCHOLESTEROLEMIA: ICD-10-CM

## 2021-04-19 DIAGNOSIS — R41.841 COGNITIVE COMMUNICATION DISORDER: ICD-10-CM

## 2021-04-19 DIAGNOSIS — R41.3 MEMORY LOSS: ICD-10-CM

## 2021-04-19 DIAGNOSIS — I10 ESSENTIAL HYPERTENSION: ICD-10-CM

## 2021-04-19 PROCEDURE — 3008F BODY MASS INDEX DOCD: CPT | Mod: CPTII,S$GLB,, | Performed by: NEUROLOGICAL SURGERY

## 2021-04-19 PROCEDURE — 3008F PR BODY MASS INDEX (BMI) DOCUMENTED: ICD-10-PCS | Mod: CPTII,S$GLB,, | Performed by: NEUROLOGICAL SURGERY

## 2021-04-19 PROCEDURE — 99999 PR PBB SHADOW E&M-EST. PATIENT-LVL IV: CPT | Mod: PBBFAC,,, | Performed by: NEUROLOGICAL SURGERY

## 2021-04-19 PROCEDURE — 3288F PR FALLS RISK ASSESSMENT DOCUMENTED: ICD-10-PCS | Mod: CPTII,S$GLB,, | Performed by: NEUROLOGICAL SURGERY

## 2021-04-19 PROCEDURE — 99205 OFFICE O/P NEW HI 60 MIN: CPT | Mod: S$GLB,,, | Performed by: NEUROLOGICAL SURGERY

## 2021-04-19 PROCEDURE — 1159F MED LIST DOCD IN RCRD: CPT | Mod: S$GLB,,, | Performed by: NEUROLOGICAL SURGERY

## 2021-04-19 PROCEDURE — 1101F PR PT FALLS ASSESS DOC 0-1 FALLS W/OUT INJ PAST YR: ICD-10-PCS | Mod: CPTII,S$GLB,, | Performed by: NEUROLOGICAL SURGERY

## 2021-04-19 PROCEDURE — 1159F PR MEDICATION LIST DOCUMENTED IN MEDICAL RECORD: ICD-10-PCS | Mod: S$GLB,,, | Performed by: NEUROLOGICAL SURGERY

## 2021-04-19 PROCEDURE — 1101F PT FALLS ASSESS-DOCD LE1/YR: CPT | Mod: CPTII,S$GLB,, | Performed by: NEUROLOGICAL SURGERY

## 2021-04-19 PROCEDURE — 3288F FALL RISK ASSESSMENT DOCD: CPT | Mod: CPTII,S$GLB,, | Performed by: NEUROLOGICAL SURGERY

## 2021-04-19 PROCEDURE — 1126F PR PAIN SEVERITY QUANTIFIED, NO PAIN PRESENT: ICD-10-PCS | Mod: S$GLB,,, | Performed by: NEUROLOGICAL SURGERY

## 2021-04-19 PROCEDURE — 1126F AMNT PAIN NOTED NONE PRSNT: CPT | Mod: S$GLB,,, | Performed by: NEUROLOGICAL SURGERY

## 2021-04-19 PROCEDURE — 99999 PR PBB SHADOW E&M-EST. PATIENT-LVL IV: ICD-10-PCS | Mod: PBBFAC,,, | Performed by: NEUROLOGICAL SURGERY

## 2021-04-19 PROCEDURE — 99205 PR OFFICE/OUTPT VISIT, NEW, LEVL V, 60-74 MIN: ICD-10-PCS | Mod: S$GLB,,, | Performed by: NEUROLOGICAL SURGERY

## 2021-04-19 RX ORDER — TRAZODONE HYDROCHLORIDE 50 MG/1
100 TABLET ORAL NIGHTLY
Qty: 60 TABLET | Refills: 11 | Status: SHIPPED | OUTPATIENT
Start: 2021-04-19 | End: 2022-01-19

## 2021-04-21 DIAGNOSIS — I10 ESSENTIAL HYPERTENSION: ICD-10-CM

## 2021-04-23 ENCOUNTER — HOSPITAL ENCOUNTER (OUTPATIENT)
Dept: RADIOLOGY | Facility: HOSPITAL | Age: 71
Discharge: HOME | End: 2021-04-23
Attending: NEUROLOGICAL SURGERY
Payer: MEDICARE

## 2021-04-23 ENCOUNTER — CLINICAL SUPPORT (OUTPATIENT)
Dept: AUDIOLOGY | Facility: CLINIC | Age: 71
End: 2021-04-23
Payer: MEDICARE

## 2021-04-23 DIAGNOSIS — R41.3 MEMORY LOSS: ICD-10-CM

## 2021-04-23 DIAGNOSIS — R42 DIZZINESS AND GIDDINESS: ICD-10-CM

## 2021-04-23 DIAGNOSIS — H90.3 SENSORINEURAL HEARING LOSS, BILATERAL: Primary | ICD-10-CM

## 2021-04-23 PROCEDURE — 70551 MRI BRAIN WITHOUT CONTRAST: ICD-10-PCS | Mod: 26,,, | Performed by: RADIOLOGY

## 2021-04-23 PROCEDURE — 92550 PR TYMPANOMETRY AND REFLEX THRESHOLD MEASUREMENTS: ICD-10-PCS | Mod: S$GLB,,, | Performed by: AUDIOLOGIST

## 2021-04-23 PROCEDURE — 92557 COMPREHENSIVE HEARING TEST: CPT | Mod: S$GLB,,, | Performed by: AUDIOLOGIST

## 2021-04-23 PROCEDURE — 70551 MRI BRAIN STEM W/O DYE: CPT | Mod: TC,PO

## 2021-04-23 PROCEDURE — 92550 TYMPANOMETRY & REFLEX THRESH: CPT | Mod: S$GLB,,, | Performed by: AUDIOLOGIST

## 2021-04-23 PROCEDURE — 92557 PR COMPREHENSIVE HEARING TEST: ICD-10-PCS | Mod: S$GLB,,, | Performed by: AUDIOLOGIST

## 2021-04-23 PROCEDURE — 70551 MRI BRAIN STEM W/O DYE: CPT | Mod: 26,,, | Performed by: RADIOLOGY

## 2021-04-28 DIAGNOSIS — I10 ESSENTIAL HYPERTENSION: ICD-10-CM

## 2021-04-30 ENCOUNTER — OFFICE VISIT (OUTPATIENT)
Dept: NEUROLOGY | Facility: CLINIC | Age: 71
End: 2021-04-30
Payer: MEDICARE

## 2021-04-30 DIAGNOSIS — I63.81 LACUNAR INFARCTION: Primary | ICD-10-CM

## 2021-04-30 PROCEDURE — 1159F MED LIST DOCD IN RCRD: CPT | Mod: 95,,, | Performed by: NEUROLOGICAL SURGERY

## 2021-04-30 PROCEDURE — 1159F PR MEDICATION LIST DOCUMENTED IN MEDICAL RECORD: ICD-10-PCS | Mod: 95,,, | Performed by: NEUROLOGICAL SURGERY

## 2021-04-30 PROCEDURE — 99214 PR OFFICE/OUTPT VISIT, EST, LEVL IV, 30-39 MIN: ICD-10-PCS | Mod: 95,,, | Performed by: NEUROLOGICAL SURGERY

## 2021-04-30 PROCEDURE — 99214 OFFICE O/P EST MOD 30 MIN: CPT | Mod: 95,,, | Performed by: NEUROLOGICAL SURGERY

## 2021-05-04 DIAGNOSIS — G56.03 BILATERAL CARPAL TUNNEL SYNDROME: ICD-10-CM

## 2021-05-04 RX ORDER — CELECOXIB 100 MG/1
100 CAPSULE ORAL DAILY
Qty: 90 CAPSULE | Refills: 3 | Status: SHIPPED | OUTPATIENT
Start: 2021-05-04 | End: 2023-04-20

## 2021-05-05 DIAGNOSIS — I10 ESSENTIAL HYPERTENSION: ICD-10-CM

## 2021-05-10 ENCOUNTER — OFFICE VISIT (OUTPATIENT)
Dept: FAMILY MEDICINE | Facility: CLINIC | Age: 71
End: 2021-05-10
Payer: MEDICARE

## 2021-05-10 VITALS
DIASTOLIC BLOOD PRESSURE: 74 MMHG | HEART RATE: 55 BPM | WEIGHT: 177.94 LBS | BODY MASS INDEX: 32.54 KG/M2 | SYSTOLIC BLOOD PRESSURE: 120 MMHG | OXYGEN SATURATION: 96 % | TEMPERATURE: 99 F

## 2021-05-10 DIAGNOSIS — F32.2 MAJOR DEPRESSIVE DISORDER, SINGLE EPISODE, SEVERE WITHOUT PSYCHOTIC FEATURES: ICD-10-CM

## 2021-05-10 DIAGNOSIS — I63.81 LACUNAR INFARCTION: Primary | ICD-10-CM

## 2021-05-10 DIAGNOSIS — R41.841 COGNITIVE COMMUNICATION DISORDER: ICD-10-CM

## 2021-05-10 PROCEDURE — 90732 PPSV23 VACC 2 YRS+ SUBQ/IM: CPT | Mod: S$GLB,,, | Performed by: FAMILY MEDICINE

## 2021-05-10 PROCEDURE — 1126F PR PAIN SEVERITY QUANTIFIED, NO PAIN PRESENT: ICD-10-PCS | Mod: S$GLB,,, | Performed by: FAMILY MEDICINE

## 2021-05-10 PROCEDURE — 3008F BODY MASS INDEX DOCD: CPT | Mod: CPTII,S$GLB,, | Performed by: FAMILY MEDICINE

## 2021-05-10 PROCEDURE — 3288F PR FALLS RISK ASSESSMENT DOCUMENTED: ICD-10-PCS | Mod: CPTII,S$GLB,, | Performed by: FAMILY MEDICINE

## 2021-05-10 PROCEDURE — 1101F PR PT FALLS ASSESS DOC 0-1 FALLS W/OUT INJ PAST YR: ICD-10-PCS | Mod: CPTII,S$GLB,, | Performed by: FAMILY MEDICINE

## 2021-05-10 PROCEDURE — 1101F PT FALLS ASSESS-DOCD LE1/YR: CPT | Mod: CPTII,S$GLB,, | Performed by: FAMILY MEDICINE

## 2021-05-10 PROCEDURE — 99999 PR PBB SHADOW E&M-EST. PATIENT-LVL IV: CPT | Mod: PBBFAC,,, | Performed by: FAMILY MEDICINE

## 2021-05-10 PROCEDURE — 99215 PR OFFICE/OUTPT VISIT, EST, LEVL V, 40-54 MIN: ICD-10-PCS | Mod: 25,S$GLB,, | Performed by: FAMILY MEDICINE

## 2021-05-10 PROCEDURE — 99499 UNLISTED E&M SERVICE: CPT | Mod: S$GLB,,, | Performed by: FAMILY MEDICINE

## 2021-05-10 PROCEDURE — 1159F PR MEDICATION LIST DOCUMENTED IN MEDICAL RECORD: ICD-10-PCS | Mod: S$GLB,,, | Performed by: FAMILY MEDICINE

## 2021-05-10 PROCEDURE — G0009 PNEUMOCOCCAL POLYSACCHARIDE VACCINE 23-VALENT =>2YO SQ IM: ICD-10-PCS | Mod: S$GLB,,, | Performed by: FAMILY MEDICINE

## 2021-05-10 PROCEDURE — 90732 PNEUMOCOCCAL POLYSACCHARIDE VACCINE 23-VALENT =>2YO SQ IM: ICD-10-PCS | Mod: S$GLB,,, | Performed by: FAMILY MEDICINE

## 2021-05-10 PROCEDURE — 90715 TDAP VACCINE 7 YRS/> IM: CPT | Mod: S$GLB,,, | Performed by: FAMILY MEDICINE

## 2021-05-10 PROCEDURE — 3288F FALL RISK ASSESSMENT DOCD: CPT | Mod: CPTII,S$GLB,, | Performed by: FAMILY MEDICINE

## 2021-05-10 PROCEDURE — 3008F PR BODY MASS INDEX (BMI) DOCUMENTED: ICD-10-PCS | Mod: CPTII,S$GLB,, | Performed by: FAMILY MEDICINE

## 2021-05-10 PROCEDURE — 90472 IMMUNIZATION ADMIN EACH ADD: CPT | Mod: S$GLB,,, | Performed by: FAMILY MEDICINE

## 2021-05-10 PROCEDURE — 1159F MED LIST DOCD IN RCRD: CPT | Mod: S$GLB,,, | Performed by: FAMILY MEDICINE

## 2021-05-10 PROCEDURE — 99499 RISK ADDL DX/OHS AUDIT: ICD-10-PCS | Mod: S$GLB,,, | Performed by: FAMILY MEDICINE

## 2021-05-10 PROCEDURE — G0009 ADMIN PNEUMOCOCCAL VACCINE: HCPCS | Mod: S$GLB,,, | Performed by: FAMILY MEDICINE

## 2021-05-10 PROCEDURE — 90472 TDAP VACCINE GREATER THAN OR EQUAL TO 7YO IM: ICD-10-PCS | Mod: S$GLB,,, | Performed by: FAMILY MEDICINE

## 2021-05-10 PROCEDURE — 1126F AMNT PAIN NOTED NONE PRSNT: CPT | Mod: S$GLB,,, | Performed by: FAMILY MEDICINE

## 2021-05-10 PROCEDURE — 90715 TDAP VACCINE GREATER THAN OR EQUAL TO 7YO IM: ICD-10-PCS | Mod: S$GLB,,, | Performed by: FAMILY MEDICINE

## 2021-05-10 PROCEDURE — 99215 OFFICE O/P EST HI 40 MIN: CPT | Mod: 25,S$GLB,, | Performed by: FAMILY MEDICINE

## 2021-05-10 PROCEDURE — 99999 PR PBB SHADOW E&M-EST. PATIENT-LVL IV: ICD-10-PCS | Mod: PBBFAC,,, | Performed by: FAMILY MEDICINE

## 2021-05-12 DIAGNOSIS — I10 ESSENTIAL HYPERTENSION: ICD-10-CM

## 2021-05-18 ENCOUNTER — PATIENT MESSAGE (OUTPATIENT)
Dept: NEUROLOGY | Facility: CLINIC | Age: 71
End: 2021-05-18

## 2021-05-18 ENCOUNTER — PATIENT MESSAGE (OUTPATIENT)
Dept: PSYCHIATRY | Facility: CLINIC | Age: 71
End: 2021-05-18

## 2021-05-19 DIAGNOSIS — I10 ESSENTIAL HYPERTENSION: ICD-10-CM

## 2021-05-24 ENCOUNTER — TELEPHONE (OUTPATIENT)
Dept: FAMILY MEDICINE | Facility: CLINIC | Age: 71
End: 2021-05-24

## 2021-05-24 ENCOUNTER — PATIENT MESSAGE (OUTPATIENT)
Dept: FAMILY MEDICINE | Facility: CLINIC | Age: 71
End: 2021-05-24

## 2021-05-24 ENCOUNTER — PATIENT MESSAGE (OUTPATIENT)
Dept: NEUROLOGY | Facility: CLINIC | Age: 71
End: 2021-05-24

## 2021-05-24 ENCOUNTER — PATIENT MESSAGE (OUTPATIENT)
Dept: PSYCHIATRY | Facility: CLINIC | Age: 71
End: 2021-05-24

## 2021-05-25 RX ORDER — BUSPIRONE HYDROCHLORIDE 5 MG/1
5 TABLET ORAL 2 TIMES DAILY
Qty: 60 TABLET | Refills: 5 | Status: SHIPPED | OUTPATIENT
Start: 2021-05-25 | End: 2021-10-12 | Stop reason: SDUPTHER

## 2021-05-26 DIAGNOSIS — I10 ESSENTIAL HYPERTENSION: ICD-10-CM

## 2021-05-27 DIAGNOSIS — R41.3 MEMORY LOSS: Primary | ICD-10-CM

## 2021-05-27 DIAGNOSIS — I63.81 LACUNAR INFARCTION: ICD-10-CM

## 2021-07-03 ENCOUNTER — NURSE TRIAGE (OUTPATIENT)
Dept: ADMINISTRATIVE | Facility: CLINIC | Age: 71
End: 2021-07-03

## 2021-07-31 ENCOUNTER — NURSE TRIAGE (OUTPATIENT)
Dept: ADMINISTRATIVE | Facility: CLINIC | Age: 71
End: 2021-07-31

## 2021-08-01 ENCOUNTER — PATIENT MESSAGE (OUTPATIENT)
Dept: FAMILY MEDICINE | Facility: CLINIC | Age: 71
End: 2021-08-01

## 2021-08-10 ENCOUNTER — OFFICE VISIT (OUTPATIENT)
Dept: FAMILY MEDICINE | Facility: CLINIC | Age: 71
End: 2021-08-10
Payer: MEDICARE

## 2021-08-10 VITALS
SYSTOLIC BLOOD PRESSURE: 110 MMHG | HEART RATE: 63 BPM | BODY MASS INDEX: 31.28 KG/M2 | DIASTOLIC BLOOD PRESSURE: 60 MMHG | OXYGEN SATURATION: 95 % | TEMPERATURE: 99 F | HEIGHT: 62 IN | WEIGHT: 170 LBS

## 2021-08-10 DIAGNOSIS — I63.81 LACUNAR INFARCTION: ICD-10-CM

## 2021-08-10 DIAGNOSIS — F43.22 ADJUSTMENT DISORDER WITH ANXIOUS MOOD: Primary | ICD-10-CM

## 2021-08-10 PROCEDURE — 3288F PR FALLS RISK ASSESSMENT DOCUMENTED: ICD-10-PCS | Mod: CPTII,S$GLB,, | Performed by: FAMILY MEDICINE

## 2021-08-10 PROCEDURE — 90750 HZV VACC RECOMBINANT IM: CPT | Mod: S$GLB,,, | Performed by: FAMILY MEDICINE

## 2021-08-10 PROCEDURE — 99999 PR PBB SHADOW E&M-EST. PATIENT-LVL III: ICD-10-PCS | Mod: PBBFAC,,, | Performed by: FAMILY MEDICINE

## 2021-08-10 PROCEDURE — 1126F AMNT PAIN NOTED NONE PRSNT: CPT | Mod: CPTII,S$GLB,, | Performed by: FAMILY MEDICINE

## 2021-08-10 PROCEDURE — 1126F PR PAIN SEVERITY QUANTIFIED, NO PAIN PRESENT: ICD-10-PCS | Mod: CPTII,S$GLB,, | Performed by: FAMILY MEDICINE

## 2021-08-10 PROCEDURE — 1101F PT FALLS ASSESS-DOCD LE1/YR: CPT | Mod: CPTII,S$GLB,, | Performed by: FAMILY MEDICINE

## 2021-08-10 PROCEDURE — 3078F DIAST BP <80 MM HG: CPT | Mod: CPTII,S$GLB,, | Performed by: FAMILY MEDICINE

## 2021-08-10 PROCEDURE — 3008F PR BODY MASS INDEX (BMI) DOCUMENTED: ICD-10-PCS | Mod: CPTII,S$GLB,, | Performed by: FAMILY MEDICINE

## 2021-08-10 PROCEDURE — 99214 PR OFFICE/OUTPT VISIT, EST, LEVL IV, 30-39 MIN: ICD-10-PCS | Mod: 25,S$GLB,, | Performed by: FAMILY MEDICINE

## 2021-08-10 PROCEDURE — 3288F FALL RISK ASSESSMENT DOCD: CPT | Mod: CPTII,S$GLB,, | Performed by: FAMILY MEDICINE

## 2021-08-10 PROCEDURE — 1101F PR PT FALLS ASSESS DOC 0-1 FALLS W/OUT INJ PAST YR: ICD-10-PCS | Mod: CPTII,S$GLB,, | Performed by: FAMILY MEDICINE

## 2021-08-10 PROCEDURE — 3008F BODY MASS INDEX DOCD: CPT | Mod: CPTII,S$GLB,, | Performed by: FAMILY MEDICINE

## 2021-08-10 PROCEDURE — 90471 IMMUNIZATION ADMIN: CPT | Mod: S$GLB,,, | Performed by: FAMILY MEDICINE

## 2021-08-10 PROCEDURE — 99999 PR PBB SHADOW E&M-EST. PATIENT-LVL III: CPT | Mod: PBBFAC,,, | Performed by: FAMILY MEDICINE

## 2021-08-10 PROCEDURE — 3074F SYST BP LT 130 MM HG: CPT | Mod: CPTII,S$GLB,, | Performed by: FAMILY MEDICINE

## 2021-08-10 PROCEDURE — 90471 ZOSTER RECOMBINANT VACCINE: ICD-10-PCS | Mod: S$GLB,,, | Performed by: FAMILY MEDICINE

## 2021-08-10 PROCEDURE — 99214 OFFICE O/P EST MOD 30 MIN: CPT | Mod: 25,S$GLB,, | Performed by: FAMILY MEDICINE

## 2021-08-10 PROCEDURE — 3074F PR MOST RECENT SYSTOLIC BLOOD PRESSURE < 130 MM HG: ICD-10-PCS | Mod: CPTII,S$GLB,, | Performed by: FAMILY MEDICINE

## 2021-08-10 PROCEDURE — 3078F PR MOST RECENT DIASTOLIC BLOOD PRESSURE < 80 MM HG: ICD-10-PCS | Mod: CPTII,S$GLB,, | Performed by: FAMILY MEDICINE

## 2021-08-10 PROCEDURE — 90750 ZOSTER RECOMBINANT VACCINE: ICD-10-PCS | Mod: S$GLB,,, | Performed by: FAMILY MEDICINE

## 2021-08-11 ENCOUNTER — OFFICE VISIT (OUTPATIENT)
Dept: PODIATRY | Facility: CLINIC | Age: 71
End: 2021-08-11
Payer: MEDICARE

## 2021-08-11 VITALS — HEIGHT: 62 IN | BODY MASS INDEX: 31.28 KG/M2 | WEIGHT: 170 LBS

## 2021-08-11 DIAGNOSIS — B35.1 ONYCHOMYCOSIS DUE TO DERMATOPHYTE: Primary | ICD-10-CM

## 2021-08-11 PROCEDURE — 3008F PR BODY MASS INDEX (BMI) DOCUMENTED: ICD-10-PCS | Mod: CPTII,S$GLB,, | Performed by: PODIATRIST

## 2021-08-11 PROCEDURE — 99999 PR PBB SHADOW E&M-EST. PATIENT-LVL III: ICD-10-PCS | Mod: PBBFAC,,, | Performed by: PODIATRIST

## 2021-08-11 PROCEDURE — 3008F BODY MASS INDEX DOCD: CPT | Mod: CPTII,S$GLB,, | Performed by: PODIATRIST

## 2021-08-11 PROCEDURE — 99203 PR OFFICE/OUTPT VISIT, NEW, LEVL III, 30-44 MIN: ICD-10-PCS | Mod: S$GLB,,, | Performed by: PODIATRIST

## 2021-08-11 PROCEDURE — 99999 PR PBB SHADOW E&M-EST. PATIENT-LVL III: CPT | Mod: PBBFAC,,, | Performed by: PODIATRIST

## 2021-08-11 PROCEDURE — 1101F PR PT FALLS ASSESS DOC 0-1 FALLS W/OUT INJ PAST YR: ICD-10-PCS | Mod: CPTII,S$GLB,, | Performed by: PODIATRIST

## 2021-08-11 PROCEDURE — 99203 OFFICE O/P NEW LOW 30 MIN: CPT | Mod: S$GLB,,, | Performed by: PODIATRIST

## 2021-08-11 PROCEDURE — 3288F PR FALLS RISK ASSESSMENT DOCUMENTED: ICD-10-PCS | Mod: CPTII,S$GLB,, | Performed by: PODIATRIST

## 2021-08-11 PROCEDURE — 3288F FALL RISK ASSESSMENT DOCD: CPT | Mod: CPTII,S$GLB,, | Performed by: PODIATRIST

## 2021-08-11 PROCEDURE — 1101F PT FALLS ASSESS-DOCD LE1/YR: CPT | Mod: CPTII,S$GLB,, | Performed by: PODIATRIST

## 2021-08-11 PROCEDURE — 1159F PR MEDICATION LIST DOCUMENTED IN MEDICAL RECORD: ICD-10-PCS | Mod: CPTII,S$GLB,, | Performed by: PODIATRIST

## 2021-08-11 PROCEDURE — 1159F MED LIST DOCD IN RCRD: CPT | Mod: CPTII,S$GLB,, | Performed by: PODIATRIST

## 2021-08-11 RX ORDER — CICLOPIROX 80 MG/ML
SOLUTION TOPICAL NIGHTLY
Qty: 1 BOTTLE | Refills: 3 | Status: SHIPPED | OUTPATIENT
Start: 2021-08-11 | End: 2023-04-26

## 2021-09-14 ENCOUNTER — PATIENT MESSAGE (OUTPATIENT)
Dept: PSYCHIATRY | Facility: CLINIC | Age: 71
End: 2021-09-14

## 2021-09-29 ENCOUNTER — TELEPHONE (OUTPATIENT)
Dept: FAMILY MEDICINE | Facility: CLINIC | Age: 71
End: 2021-09-29

## 2021-10-04 ENCOUNTER — PATIENT MESSAGE (OUTPATIENT)
Dept: FAMILY MEDICINE | Facility: CLINIC | Age: 71
End: 2021-10-04

## 2021-10-08 ENCOUNTER — PES CALL (OUTPATIENT)
Dept: ADMINISTRATIVE | Facility: CLINIC | Age: 71
End: 2021-10-08

## 2021-10-12 ENCOUNTER — OFFICE VISIT (OUTPATIENT)
Dept: PSYCHIATRY | Facility: CLINIC | Age: 71
End: 2021-10-12
Payer: COMMERCIAL

## 2021-10-12 DIAGNOSIS — F43.22 ADJUSTMENT DISORDER WITH ANXIOUS MOOD: ICD-10-CM

## 2021-10-12 DIAGNOSIS — F32.2 MAJOR DEPRESSIVE DISORDER, SINGLE EPISODE, SEVERE WITHOUT PSYCHOTIC FEATURES: Primary | ICD-10-CM

## 2021-10-12 PROCEDURE — 1159F PR MEDICATION LIST DOCUMENTED IN MEDICAL RECORD: ICD-10-PCS | Mod: CPTII,95,, | Performed by: PSYCHIATRY & NEUROLOGY

## 2021-10-12 PROCEDURE — 1160F RVW MEDS BY RX/DR IN RCRD: CPT | Mod: CPTII,95,, | Performed by: PSYCHIATRY & NEUROLOGY

## 2021-10-12 PROCEDURE — 90833 PR PSYCHOTHERAPY W/PATIENT W/E&M, 30 MIN (ADD ON): ICD-10-PCS | Mod: 95,,, | Performed by: PSYCHIATRY & NEUROLOGY

## 2021-10-12 PROCEDURE — 99499 UNLISTED E&M SERVICE: CPT | Mod: 95,,, | Performed by: PSYCHIATRY & NEUROLOGY

## 2021-10-12 PROCEDURE — 99214 OFFICE O/P EST MOD 30 MIN: CPT | Mod: 95,,, | Performed by: PSYCHIATRY & NEUROLOGY

## 2021-10-12 PROCEDURE — 99499 RISK ADDL DX/OHS AUDIT: ICD-10-PCS | Mod: 95,,, | Performed by: PSYCHIATRY & NEUROLOGY

## 2021-10-12 PROCEDURE — 1160F PR REVIEW ALL MEDS BY PRESCRIBER/CLIN PHARMACIST DOCUMENTED: ICD-10-PCS | Mod: CPTII,95,, | Performed by: PSYCHIATRY & NEUROLOGY

## 2021-10-12 PROCEDURE — 99214 PR OFFICE/OUTPT VISIT, EST, LEVL IV, 30-39 MIN: ICD-10-PCS | Mod: 95,,, | Performed by: PSYCHIATRY & NEUROLOGY

## 2021-10-12 PROCEDURE — 90833 PSYTX W PT W E/M 30 MIN: CPT | Mod: 95,,, | Performed by: PSYCHIATRY & NEUROLOGY

## 2021-10-12 PROCEDURE — 1159F MED LIST DOCD IN RCRD: CPT | Mod: CPTII,95,, | Performed by: PSYCHIATRY & NEUROLOGY

## 2021-10-12 RX ORDER — ESCITALOPRAM OXALATE 20 MG/1
20 TABLET ORAL DAILY
Qty: 30 TABLET | Refills: 5 | Status: SHIPPED | OUTPATIENT
Start: 2021-10-12 | End: 2021-11-29 | Stop reason: SDUPTHER

## 2021-10-12 RX ORDER — BUSPIRONE HYDROCHLORIDE 5 MG/1
5 TABLET ORAL 2 TIMES DAILY
Qty: 60 TABLET | Refills: 5 | Status: SHIPPED | OUTPATIENT
Start: 2021-10-12 | End: 2022-01-19 | Stop reason: SDUPTHER

## 2021-10-12 RX ORDER — HYDROXYZINE HYDROCHLORIDE 50 MG/1
50 TABLET, FILM COATED ORAL 3 TIMES DAILY PRN
Qty: 90 TABLET | Refills: 5 | Status: SHIPPED | OUTPATIENT
Start: 2021-10-12 | End: 2022-01-19

## 2021-10-13 ENCOUNTER — TELEPHONE (OUTPATIENT)
Dept: FAMILY MEDICINE | Facility: CLINIC | Age: 71
End: 2021-10-13

## 2021-11-08 ENCOUNTER — PES CALL (OUTPATIENT)
Dept: ADMINISTRATIVE | Facility: CLINIC | Age: 71
End: 2021-11-08
Payer: MEDICARE

## 2021-11-17 ENCOUNTER — OFFICE VISIT (OUTPATIENT)
Dept: NEUROLOGY | Facility: CLINIC | Age: 71
End: 2021-11-17
Payer: MEDICARE

## 2021-11-17 DIAGNOSIS — R41.3 MEMORY LOSS: Primary | ICD-10-CM

## 2021-11-17 DIAGNOSIS — R41.841 COGNITIVE COMMUNICATION DISORDER: ICD-10-CM

## 2021-11-17 PROCEDURE — 99215 OFFICE O/P EST HI 40 MIN: CPT | Mod: 95,,, | Performed by: NEUROLOGICAL SURGERY

## 2021-11-17 PROCEDURE — 99215 PR OFFICE/OUTPT VISIT, EST, LEVL V, 40-54 MIN: ICD-10-PCS | Mod: 95,,, | Performed by: NEUROLOGICAL SURGERY

## 2021-11-17 RX ORDER — DONEPEZIL HYDROCHLORIDE 10 MG/1
10 TABLET, FILM COATED ORAL NIGHTLY
Qty: 30 TABLET | Refills: 11 | Status: SHIPPED | OUTPATIENT
Start: 2021-11-17 | End: 2021-11-17 | Stop reason: SDUPTHER

## 2021-11-17 RX ORDER — DONEPEZIL HYDROCHLORIDE 10 MG/1
10 TABLET, FILM COATED ORAL NIGHTLY
Qty: 30 TABLET | Refills: 11 | Status: SHIPPED | OUTPATIENT
Start: 2021-11-17 | End: 2023-04-20 | Stop reason: SDUPTHER

## 2021-11-28 ENCOUNTER — PATIENT MESSAGE (OUTPATIENT)
Dept: FAMILY MEDICINE | Facility: CLINIC | Age: 71
End: 2021-11-28
Payer: MEDICARE

## 2021-11-28 DIAGNOSIS — R05.8 ACE-INHIBITOR COUGH: ICD-10-CM

## 2021-11-28 DIAGNOSIS — T46.4X5A ACE-INHIBITOR COUGH: ICD-10-CM

## 2021-11-28 DIAGNOSIS — F32.2 MAJOR DEPRESSIVE DISORDER, SINGLE EPISODE, SEVERE WITHOUT PSYCHOTIC FEATURES: ICD-10-CM

## 2021-11-28 DIAGNOSIS — E78.5 HYPERLIPIDEMIA, UNSPECIFIED HYPERLIPIDEMIA TYPE: ICD-10-CM

## 2021-11-28 DIAGNOSIS — I10 ESSENTIAL HYPERTENSION: ICD-10-CM

## 2021-11-29 ENCOUNTER — TELEPHONE (OUTPATIENT)
Dept: NEUROLOGY | Facility: CLINIC | Age: 71
End: 2021-11-29
Payer: MEDICARE

## 2021-11-29 RX ORDER — SIMVASTATIN 20 MG/1
20 TABLET, FILM COATED ORAL NIGHTLY
Qty: 90 TABLET | Refills: 3 | Status: SHIPPED | OUTPATIENT
Start: 2021-11-29 | End: 2022-03-07 | Stop reason: SDUPTHER

## 2021-11-29 RX ORDER — IRBESARTAN AND HYDROCHLOROTHIAZIDE 150; 12.5 MG/1; MG/1
1 TABLET, FILM COATED ORAL DAILY
Qty: 90 TABLET | Refills: 3 | Status: SHIPPED | OUTPATIENT
Start: 2021-11-29 | End: 2022-03-07 | Stop reason: SDUPTHER

## 2021-11-29 RX ORDER — ESCITALOPRAM OXALATE 20 MG/1
20 TABLET ORAL DAILY
Qty: 30 TABLET | Refills: 5 | Status: SHIPPED | OUTPATIENT
Start: 2021-11-29 | End: 2022-01-19

## 2021-11-29 RX ORDER — AMLODIPINE BESYLATE 5 MG/1
TABLET ORAL
Qty: 90 TABLET | Refills: 3 | Status: SHIPPED | OUTPATIENT
Start: 2021-11-29 | End: 2022-01-31 | Stop reason: SDUPTHER

## 2021-12-03 ENCOUNTER — PATIENT MESSAGE (OUTPATIENT)
Dept: NEUROLOGY | Facility: CLINIC | Age: 71
End: 2021-12-03
Payer: MEDICARE

## 2021-12-06 ENCOUNTER — PATIENT MESSAGE (OUTPATIENT)
Dept: FAMILY MEDICINE | Facility: CLINIC | Age: 71
End: 2021-12-06
Payer: MEDICARE

## 2021-12-06 ENCOUNTER — PATIENT MESSAGE (OUTPATIENT)
Dept: NEUROLOGY | Facility: CLINIC | Age: 71
End: 2021-12-06
Payer: MEDICARE

## 2021-12-07 ENCOUNTER — HOSPITAL ENCOUNTER (EMERGENCY)
Facility: HOSPITAL | Age: 71
Discharge: HOME | End: 2021-12-08
Attending: EMERGENCY MEDICINE
Payer: MEDICARE

## 2021-12-07 ENCOUNTER — TELEPHONE (OUTPATIENT)
Dept: FAMILY MEDICINE | Facility: CLINIC | Age: 71
End: 2021-12-07
Payer: MEDICARE

## 2021-12-07 VITALS
DIASTOLIC BLOOD PRESSURE: 79 MMHG | TEMPERATURE: 98 F | BODY MASS INDEX: 31.28 KG/M2 | HEIGHT: 62 IN | WEIGHT: 170 LBS | SYSTOLIC BLOOD PRESSURE: 182 MMHG | RESPIRATION RATE: 16 BRPM | OXYGEN SATURATION: 97 % | HEART RATE: 67 BPM

## 2021-12-07 DIAGNOSIS — F43.0 ACUTE SITUATIONAL DISTURBANCE: Primary | ICD-10-CM

## 2021-12-07 LAB
ALBUMIN SERPL BCP-MCNC: 4.3 G/DL (ref 3.5–5.2)
ALP SERPL-CCNC: 64 U/L (ref 55–135)
ALT SERPL W/O P-5'-P-CCNC: 40 U/L (ref 10–44)
AMPHET+METHAMPHET UR QL: NEGATIVE
ANION GAP SERPL CALC-SCNC: 12 MMOL/L (ref 8–16)
APAP SERPL-MCNC: <3 UG/ML (ref 10–20)
AST SERPL-CCNC: 28 U/L (ref 10–40)
BACTERIA #/AREA URNS AUTO: NORMAL /HPF
BARBITURATES UR QL SCN>200 NG/ML: NEGATIVE
BASOPHILS # BLD AUTO: 0.05 K/UL (ref 0–0.2)
BASOPHILS NFR BLD: 0.7 % (ref 0–1.9)
BENZODIAZ UR QL SCN>200 NG/ML: NEGATIVE
BILIRUB SERPL-MCNC: 0.5 MG/DL (ref 0.1–1)
BILIRUB UR QL STRIP: NEGATIVE
BUN SERPL-MCNC: 20 MG/DL (ref 8–23)
BZE UR QL SCN: NEGATIVE
CALCIUM SERPL-MCNC: 9.9 MG/DL (ref 8.7–10.5)
CANNABINOIDS UR QL SCN: NEGATIVE
CHLORIDE SERPL-SCNC: 104 MMOL/L (ref 95–110)
CLARITY UR REFRACT.AUTO: CLEAR
CO2 SERPL-SCNC: 25 MMOL/L (ref 23–29)
COLOR UR AUTO: YELLOW
CREAT SERPL-MCNC: 1.1 MG/DL (ref 0.5–1.4)
CREAT UR-MCNC: 106 MG/DL (ref 15–325)
DIFFERENTIAL METHOD: NORMAL
EOSINOPHIL # BLD AUTO: 0.3 K/UL (ref 0–0.5)
EOSINOPHIL NFR BLD: 3.6 % (ref 0–8)
ERYTHROCYTE [DISTWIDTH] IN BLOOD BY AUTOMATED COUNT: 13.4 % (ref 11.5–14.5)
EST. GFR  (AFRICAN AMERICAN): 58.4 ML/MIN/1.73 M^2
EST. GFR  (NON AFRICAN AMERICAN): 50.6 ML/MIN/1.73 M^2
ETHANOL SERPL-MCNC: <10 MG/DL
GLUCOSE SERPL-MCNC: 94 MG/DL (ref 70–110)
GLUCOSE UR QL STRIP: NEGATIVE
HCT VFR BLD AUTO: 39 % (ref 37–48.5)
HGB BLD-MCNC: 12.7 G/DL (ref 12–16)
HGB UR QL STRIP: NEGATIVE
HYALINE CASTS UR QL AUTO: 1 /LPF
IMM GRANULOCYTES # BLD AUTO: 0.02 K/UL (ref 0–0.04)
IMM GRANULOCYTES NFR BLD AUTO: 0.3 % (ref 0–0.5)
KETONES UR QL STRIP: NEGATIVE
LEUKOCYTE ESTERASE UR QL STRIP: NEGATIVE
LYMPHOCYTES # BLD AUTO: 2.7 K/UL (ref 1–4.8)
LYMPHOCYTES NFR BLD: 38.5 % (ref 18–48)
MCH RBC QN AUTO: 30.1 PG (ref 27–31)
MCHC RBC AUTO-ENTMCNC: 32.6 G/DL (ref 32–36)
MCV RBC AUTO: 92 FL (ref 82–98)
METHADONE UR QL SCN>300 NG/ML: NEGATIVE
MICROSCOPIC COMMENT: NORMAL
MONOCYTES # BLD AUTO: 0.6 K/UL (ref 0.3–1)
MONOCYTES NFR BLD: 8.2 % (ref 4–15)
NEUTROPHILS # BLD AUTO: 3.4 K/UL (ref 1.8–7.7)
NEUTROPHILS NFR BLD: 48.7 % (ref 38–73)
NITRITE UR QL STRIP: NEGATIVE
NRBC BLD-RTO: 0 /100 WBC
OPIATES UR QL SCN: NEGATIVE
PCP UR QL SCN>25 NG/ML: NEGATIVE
PH UR STRIP: 5 [PH] (ref 5–8)
PLATELET # BLD AUTO: 250 K/UL (ref 150–450)
PMV BLD AUTO: 9.6 FL (ref 9.2–12.9)
POTASSIUM SERPL-SCNC: 3.8 MMOL/L (ref 3.5–5.1)
PROT SERPL-MCNC: 8 G/DL (ref 6–8.4)
PROT UR QL STRIP: NEGATIVE
RBC # BLD AUTO: 4.22 M/UL (ref 4–5.4)
RBC #/AREA URNS AUTO: 1 /HPF (ref 0–4)
SODIUM SERPL-SCNC: 141 MMOL/L (ref 136–145)
SP GR UR STRIP: 1.02 (ref 1–1.03)
SQUAMOUS #/AREA URNS AUTO: 1 /HPF
TOXICOLOGY INFORMATION: NORMAL
TSH SERPL DL<=0.005 MIU/L-ACNC: 1.93 UIU/ML (ref 0.4–4)
URN SPEC COLLECT METH UR: NORMAL
WBC # BLD AUTO: 6.91 K/UL (ref 3.9–12.7)
WBC #/AREA URNS AUTO: 0 /HPF (ref 0–5)

## 2021-12-07 PROCEDURE — 80143 DRUG ASSAY ACETAMINOPHEN: CPT | Performed by: EMERGENCY MEDICINE

## 2021-12-07 PROCEDURE — 80053 COMPREHEN METABOLIC PANEL: CPT | Performed by: EMERGENCY MEDICINE

## 2021-12-07 PROCEDURE — 99283 EMERGENCY DEPT VISIT LOW MDM: CPT

## 2021-12-07 PROCEDURE — 85025 COMPLETE CBC W/AUTO DIFF WBC: CPT | Performed by: EMERGENCY MEDICINE

## 2021-12-07 PROCEDURE — 80307 DRUG TEST PRSMV CHEM ANLYZR: CPT | Performed by: EMERGENCY MEDICINE

## 2021-12-07 PROCEDURE — 81001 URINALYSIS AUTO W/SCOPE: CPT | Performed by: EMERGENCY MEDICINE

## 2021-12-07 PROCEDURE — 84443 ASSAY THYROID STIM HORMONE: CPT | Performed by: EMERGENCY MEDICINE

## 2021-12-07 PROCEDURE — 86803 HEPATITIS C AB TEST: CPT | Performed by: EMERGENCY MEDICINE

## 2021-12-07 PROCEDURE — 99284 PR EMERGENCY DEPT VISIT,LEVEL IV: ICD-10-PCS | Mod: ,,, | Performed by: EMERGENCY MEDICINE

## 2021-12-07 PROCEDURE — 82077 ASSAY SPEC XCP UR&BREATH IA: CPT | Performed by: EMERGENCY MEDICINE

## 2021-12-07 PROCEDURE — 99284 EMERGENCY DEPT VISIT MOD MDM: CPT | Mod: ,,, | Performed by: EMERGENCY MEDICINE

## 2021-12-08 ENCOUNTER — PES CALL (OUTPATIENT)
Dept: ADMINISTRATIVE | Facility: CLINIC | Age: 71
End: 2021-12-08
Payer: MEDICARE

## 2021-12-08 LAB — HCV AB SERPL QL IA: NEGATIVE

## 2021-12-29 ENCOUNTER — PES CALL (OUTPATIENT)
Dept: ADMINISTRATIVE | Facility: CLINIC | Age: 71
End: 2021-12-29
Payer: MEDICARE

## 2022-01-04 ENCOUNTER — PES CALL (OUTPATIENT)
Dept: ADMINISTRATIVE | Facility: CLINIC | Age: 72
End: 2022-01-04
Payer: MEDICARE

## 2022-01-06 ENCOUNTER — TELEPHONE (OUTPATIENT)
Dept: NEUROLOGY | Facility: CLINIC | Age: 72
End: 2022-01-06
Payer: MEDICARE

## 2022-01-06 NOTE — TELEPHONE ENCOUNTER
Patient called for our 9:30 AM appointment. Her  answered and explained that she was not present and that I should call her cell. He was unavailable to talk. Called the patient's cell but there was no answer and I am unable to leave a voicemail.

## 2022-01-19 ENCOUNTER — OFFICE VISIT (OUTPATIENT)
Dept: PSYCHIATRY | Facility: CLINIC | Age: 72
End: 2022-01-19
Payer: COMMERCIAL

## 2022-01-19 DIAGNOSIS — F32.2 MAJOR DEPRESSIVE DISORDER, SINGLE EPISODE, SEVERE WITHOUT PSYCHOTIC FEATURES: Primary | ICD-10-CM

## 2022-01-19 PROCEDURE — 90833 PSYTX W PT W E/M 30 MIN: CPT | Mod: 95,,, | Performed by: PSYCHIATRY & NEUROLOGY

## 2022-01-19 PROCEDURE — 1159F MED LIST DOCD IN RCRD: CPT | Mod: CPTII,95,, | Performed by: PSYCHIATRY & NEUROLOGY

## 2022-01-19 PROCEDURE — 99214 PR OFFICE/OUTPT VISIT, EST, LEVL IV, 30-39 MIN: ICD-10-PCS | Mod: 95,,, | Performed by: PSYCHIATRY & NEUROLOGY

## 2022-01-19 PROCEDURE — 1159F PR MEDICATION LIST DOCUMENTED IN MEDICAL RECORD: ICD-10-PCS | Mod: CPTII,95,, | Performed by: PSYCHIATRY & NEUROLOGY

## 2022-01-19 PROCEDURE — 1160F PR REVIEW ALL MEDS BY PRESCRIBER/CLIN PHARMACIST DOCUMENTED: ICD-10-PCS | Mod: CPTII,95,, | Performed by: PSYCHIATRY & NEUROLOGY

## 2022-01-19 PROCEDURE — 90833 PR PSYCHOTHERAPY W/PATIENT W/E&M, 30 MIN (ADD ON): ICD-10-PCS | Mod: 95,,, | Performed by: PSYCHIATRY & NEUROLOGY

## 2022-01-19 PROCEDURE — 1160F RVW MEDS BY RX/DR IN RCRD: CPT | Mod: CPTII,95,, | Performed by: PSYCHIATRY & NEUROLOGY

## 2022-01-19 PROCEDURE — 99214 OFFICE O/P EST MOD 30 MIN: CPT | Mod: 95,,, | Performed by: PSYCHIATRY & NEUROLOGY

## 2022-01-19 RX ORDER — BUSPIRONE HYDROCHLORIDE 5 MG/1
5 TABLET ORAL 2 TIMES DAILY
Qty: 60 TABLET | Refills: 5 | Status: SHIPPED | OUTPATIENT
Start: 2022-01-19 | End: 2022-03-07 | Stop reason: SDUPTHER

## 2022-01-19 NOTE — PROGRESS NOTES
"Outpatient Psychiatry Follow-Up Visit (MD/NP)    1/19/2022Phone Visit. Patient could not get into virtual system. She did not have the system she needed to enter the system.(sister Flor present to help with current information) (29", with 4" on meds and 25" for update of history, and mental status,and supportive counseling)    Clinical Status of Patient:  Outpatient (Ambulatory)    Chief Complaint:  Rosa Isela Pate is a 71 y.o. female who presents today for follow-up of depression.  Met with patient and sister.      Interval History and Content of Current Session:  Interim Events/Subjective Report/Content of Current Session: Patient is with her sister Flor at this time. Patient is now living with her sister Flor. Patient described an incident with relatives when she was brought to the hospital and let go, and now has moved to her sister's home.Patient had been brought to the ER by a 's order and then released. Patient has no active thoughts of harming herself at this time. Patient has been in [place] at her sister's home since the 12th of December last year. Sister added information re this incident. Patient does have some memory loss and sister has to write things down to help patient recall things and events.  Patient is doing better since in [place] with her sister. Patient is now sleeping better and not as agitated. Patient is only taking Buspar now and according to her sister, Flor, is not now taking any other psych meds at this time. Appetite is good. Patient is bored at times and does enjoy what she does at her sister's home in . Patient does enjoy being with her grandchildren. Patient also showed a sense of humor today. Patient has no current signs of radha or psychosis. We discussed my FPC as well.     Psychotherapy:  · Target symptoms: depression  · Why chosen therapy is appropriate versus another modality: relevant to diagnosis, patient responds to this modality, evidence based " practice  · Outcome monitoring methods: self-report  · Therapeutic intervention type: supportive psychotherapy  · Topics discussed/themes: mood difficulties  · The patient's response to the intervention is accepting. The patient's progress toward treatment goals is limited.   · Duration of intervention: 29 minutes.    Review of Systems   · PSYCHIATRIC: Pertinant items are noted in the narrative.    Past Medical, Family and Social History: The patient's past medical, family and social history have been reviewed and updated as appropriate within the electronic medical record - see encounter notes.    Compliance: yes    Side effects: None    Risk Parameters:  Patient reports no suicidal ideation  Patient reports no homicidal ideation  Patient reports no self-injurious behavior  Patient reports no violent behavior    Exam (detailed: at least 9 elements; comprehensive: all 15 elements)   Constitutional  Vitals:  Most recent vital signs, dated less than 90 days prior to this appointment, were reviewed.   There were no vitals filed for this visit.Patient has not had recent vital signs in [place].     General:  unremarkable, age appropriate, could not assess appearance     Musculoskeletal  Muscle Strength/Tone:  patient reports adequate muscle tone and strength   Gait & Station:  non-ataxic     Psychiatric  Speech:  no latency; no press, spontaneous   Mood & Affect:  anxious  congruent and appropriate   Thought Process:  normal and logical   Associations:  intact   Thought Content:  normal, no suicidality, no homicidality, delusions, or paranoia   Insight:  has awareness of illness   Judgement: behavior is adequate to circumstances   Orientation:  grossly intact   Memory: intact for content of interview, some impairment of recent memory   Language: grossly intact   Attention Span & Concentration:  able to focus   Fund of Knowledge:  not tested     Assessment and Diagnosis   Status/Progress: Based on the examination today, the  patient's problem(s) is/are improved.  New problems have not been presented today.   Co-morbidities and recovering from CVA of a year ago are complicating management of the primary condition.  The working differential for this patient includes depression.     General Impression: Patient describes an improved mood. Patient is able to be more active now and more energetic. Patient is able to respond more actively and clearly at this time. Patient is pleased with her current status. Patient has a better sense of enjoyment as well. Patient seems more clear-headed with her responses, more spontaneous, and more focused.      ICD-10-CM ICD-9-CM   1. Major depressive disorder, single episode, severe without psychotic features  F32.2 296.23       Intervention/Counseling/Treatment Plan   · Medication Management: The risks and benefits of medication were discussed with the patient. Patient agrees to continue Buspar 5 mg bid.      Return to Clinic: 2 months

## 2022-01-31 ENCOUNTER — PATIENT MESSAGE (OUTPATIENT)
Dept: FAMILY MEDICINE | Facility: CLINIC | Age: 72
End: 2022-01-31
Payer: MEDICARE

## 2022-01-31 DIAGNOSIS — I10 ESSENTIAL HYPERTENSION: ICD-10-CM

## 2022-01-31 DIAGNOSIS — F32.2 MAJOR DEPRESSIVE DISORDER, SINGLE EPISODE, SEVERE WITHOUT PSYCHOTIC FEATURES: ICD-10-CM

## 2022-01-31 NOTE — TELEPHONE ENCOUNTER
Care Due:                  Date            Visit Type   Department     Provider  --------------------------------------------------------------------------------                                             [facility]                                           MED/ INTERNAL  Last Visit: 08-      None         MED/ PEDS      Clarissa Hodgson                                           [facility]                                           MED/ INTERNAL  Next Visit: 02-      None         MED/ PEDS      Clarissa Hodgson                                                            Last  Test          Frequency    Reason                     Performed    Due Date  --------------------------------------------------------------------------------    Lipid Panel.  12 months..  simvastatin..............  Not Found    Overdue    Powered by [employer]. Reference number: [id number].   1/31/2022 2:42:59 PM CST

## 2022-02-01 ENCOUNTER — PATIENT MESSAGE (OUTPATIENT)
Dept: FAMILY MEDICINE | Facility: CLINIC | Age: 72
End: 2022-02-01
Payer: MEDICARE

## 2022-02-01 RX ORDER — METOPROLOL SUCCINATE 25 MG/1
TABLET, EXTENDED RELEASE ORAL
Qty: 90 TABLET | Refills: 3 | Status: SHIPPED | OUTPATIENT
Start: 2022-02-01 | End: 2022-03-07 | Stop reason: SDUPTHER

## 2022-02-01 RX ORDER — NAPROXEN SODIUM 220 MG/1
81 TABLET, FILM COATED ORAL DAILY
Qty: 90 TABLET | Refills: 3 | Status: SHIPPED | OUTPATIENT
Start: 2022-02-01 | End: 2022-03-07 | Stop reason: SDUPTHER

## 2022-02-01 RX ORDER — ATORVASTATIN CALCIUM 80 MG/1
80 TABLET, FILM COATED ORAL NIGHTLY
Qty: 90 TABLET | Refills: 3 | Status: SHIPPED | OUTPATIENT
Start: 2022-02-01 | End: 2022-03-07 | Stop reason: SDUPTHER

## 2022-02-01 RX ORDER — AMLODIPINE BESYLATE 5 MG/1
TABLET ORAL
Qty: 90 TABLET | Refills: 3 | Status: SHIPPED | OUTPATIENT
Start: 2022-02-01 | End: 2022-06-09 | Stop reason: SDUPTHER

## 2022-02-09 ENCOUNTER — TELEPHONE (OUTPATIENT)
Dept: FAMILY MEDICINE | Facility: CLINIC | Age: 72
End: 2022-02-09
Payer: MEDICARE

## 2022-02-09 ENCOUNTER — OFFICE VISIT (OUTPATIENT)
Dept: FAMILY MEDICINE | Facility: CLINIC | Age: 72
End: 2022-02-09
Payer: MEDICARE

## 2022-02-09 DIAGNOSIS — I63.81 LACUNAR INFARCTION: Primary | ICD-10-CM

## 2022-02-09 DIAGNOSIS — F32.2 MAJOR DEPRESSIVE DISORDER, SINGLE EPISODE, SEVERE WITHOUT PSYCHOTIC FEATURES: ICD-10-CM

## 2022-02-09 DIAGNOSIS — I10 ESSENTIAL HYPERTENSION: ICD-10-CM

## 2022-02-09 PROCEDURE — 99499 RISK ADDL DX/OHS AUDIT: ICD-10-PCS | Mod: 95,,, | Performed by: FAMILY MEDICINE

## 2022-02-09 PROCEDURE — 99215 PR OFFICE/OUTPT VISIT, EST, LEVL V, 40-54 MIN: ICD-10-PCS | Mod: 95,,, | Performed by: FAMILY MEDICINE

## 2022-02-09 PROCEDURE — 1160F PR REVIEW ALL MEDS BY PRESCRIBER/CLIN PHARMACIST DOCUMENTED: ICD-10-PCS | Mod: CPTII,95,, | Performed by: FAMILY MEDICINE

## 2022-02-09 PROCEDURE — 99215 OFFICE O/P EST HI 40 MIN: CPT | Mod: 95,,, | Performed by: FAMILY MEDICINE

## 2022-02-09 PROCEDURE — 99499 UNLISTED E&M SERVICE: CPT | Mod: 95,,, | Performed by: FAMILY MEDICINE

## 2022-02-09 PROCEDURE — 1160F RVW MEDS BY RX/DR IN RCRD: CPT | Mod: CPTII,95,, | Performed by: FAMILY MEDICINE

## 2022-02-09 PROCEDURE — 1159F MED LIST DOCD IN RCRD: CPT | Mod: CPTII,95,, | Performed by: FAMILY MEDICINE

## 2022-02-09 PROCEDURE — 1159F PR MEDICATION LIST DOCUMENTED IN MEDICAL RECORD: ICD-10-PCS | Mod: CPTII,95,, | Performed by: FAMILY MEDICINE

## 2022-02-09 NOTE — PROGRESS NOTES
Assessment & Plan  Problem List Items Addressed This Visit        Neuro    Lacunar infarction - Primary    Overview     Images are not currently available to review, but based on the patient's history she likely has chronic microvascular ischemic disease with superimposed acute ischemic event that was suffered in December.  Possibility exists that she could have extension of her stroke or continued accumulation of the microvascular changes which could be accounting for her cognitive decline.  Also equally possible that the patient has increased levels of stress in stressors at home causing her cognitive presentation.  MRI to evaluate the former.  Started trazodone to address the possibility of it being the latter.              Psychiatric    Major depressive disorder, single episode, severe without psychotic features       Cardiac/Vascular    Essential hypertension    Overview     Blood pressure goal less than 140/90               She is currently living with her sister.  She is working on obtaining an apartment to live in [place].  Her sister was present for the entire appointment.  Greater than 45 minutes was spent with this patient with greater than 50% spent with face-to-face counseling on above listed conditions.      Health Maintenance reviewed  Follow-up: No follow-ups on file.    ______________________________________________________________________    Chief Complaint  No chief complaint on file.      HPI  Rosa Isela Pate is a 71 y.o. female with multiple medical diagnoses as listed in the medical history and problem list that presents for follow up.  Pt is known to me with last appointment 8/10/2021.    The patient location is: [place]  The chief complaint leading to consultation is: medication refills    Visit type: audiovisual    Face to Face time with patient: 20 minutes  32 minutes of total time spent on the encounter, which includes face to face time and non-face to face time preparing to  see the patient (eg, review of tests), Obtaining and/or reviewing separately obtained history, Documenting clinical information in the electronic or other health record, Independently interpreting results (not separately reported) and communicating results to the patient/family/caregiver, or Care coordination (not separately reported).         Each patient to whom he or she provides medical services by telemedicine is:  (1) informed of the relationship between the physician and patient and the respective role of any other health care provider with respect to management of the patient; and (2) notified that he or she may decline to receive medical services by telemedicine and may withdraw from such care at any time.    Notes:   She report with her sister improved consciousness.  She does not have increased confusion.  She has discontinued several medications that is related to her mood.  Her mood has improved by discontinuing her medication.     She was sent to the ER by the cororner as her  reported her as a danger to herself.      She is able to drive.  She does not have any limitations. Her dizziness is improved.       PAST MEDICAL HISTORY:  Past Medical History:   Diagnosis Date    Depression     Hyperlipidemia     Hypertension     Obesity     Tobacco dependence        PAST SURGICAL HISTORY:  Past Surgical History:   Procedure Laterality Date    CHOLECYSTECTOMY      removal    COLONOSCOPY N/A 11/1/2017    Procedure: COLONOSCOPY;  Surgeon: Francisco Javier Bai MD;  Location: [facility];  Service: Endoscopy;  Laterality: N/A;    HYSTERECTOMY      MYOMECTOMY      removal    OOPHORECTOMY      TUBAL LIGATION         SOCIAL HISTORY:  Social History     Socioeconomic History    Marital status:    Tobacco Use    Smoking status: Current Every Day Smoker     Packs/day: 1.00     Years: 30.00     Pack years: 30.00     Types: Cigarettes    Smokeless tobacco: Never Used   Substance and Sexual Activity     Alcohol use: Yes     Alcohol/week: 0.0 standard drinks    Drug use: No    Sexual activity: Yes     Partners: Male     Social Determinants of Health     Financial Resource Strain: High Risk    Difficulty of Paying Living Expenses: Hard   Transportation Needs: Unmet Transportation Needs    Lack of Transportation (Medical): Yes   Physical Activity: Unknown    Days of Exercise per Week: 1 day       FAMILY HISTORY:  Family History   Problem Relation Age of Onset    Hypertension Mother     Heart disease Father        ALLERGIES AND MEDICATIONS: updated and reviewed.  Review of patient's allergies indicates:   Allergen Reactions    Enalapril Other (See Comments)     Ace cough     Current Outpatient Medications   Medication Sig Dispense Refill    amLODIPine (NORVASC) 5 MG tablet TAKE 1 TABLET BY MOUTH ONCE DAILY APPOINTMENT NEEDED  WITH DR. CORREA FOR  FUTURE REFILLS 90 tablet 3    aspirin 81 MG Chew Take 1 tablet (81 mg total) by mouth once daily. 90 tablet 3    atorvastatin (LIPITOR) 80 MG tablet Take 1 tablet (80 mg total) by mouth nightly. 90 tablet 3    busPIRone (BUSPAR) 5 MG Tab Take 1 tablet (5 mg total) by mouth 2 (two) times daily. 60 tablet 5    celecoxib (CELEBREX) 100 MG capsule Take 1 capsule (100 mg total) by mouth once daily. 90 capsule 3    ciclopirox (PENLAC) 8 % Soln Apply topically nightly. 1 Bottle 3    donepeziL (ARICEPT) 10 MG tablet Take 1 tablet (10 mg total) by mouth every evening. Take one half tablet for one week then as prescribed. 30 tablet 11    irbesartan-hydrochlorothiazide (AVALIDE) 150-12.5 mg per tablet Take 1 tablet by mouth once daily. 90 tablet 3    meclizine (ANTIVERT) 25 mg tablet TAKE 1 TABLET(25 MG) BY MOUTH THREE TIMES DAILY AS NEEDED FOR DIZZINESS 90 tablet 0    metoprolol succinate (TOPROL-XL) 25 MG 24 hr tablet TAKE 1 TABLET BY MOUTH  DAILY 90 tablet 3    multivitamin with minerals tablet       simvastatin (ZOCOR) 20 MG tablet Take 1 tablet (20 mg  total) by mouth every evening. 90 tablet 3     No current facility-administered medications for this visit.         ROS  Review of Systems   Constitutional: Negative for activity change and unexpected weight change.   HENT: Negative for hearing loss, rhinorrhea and trouble swallowing.    Eyes: Negative for discharge and visual disturbance.   Respiratory: Negative for chest tightness and wheezing.    Cardiovascular: Negative for chest pain and palpitations.   Gastrointestinal: Negative for blood in stool, constipation, diarrhea and vomiting.   Genitourinary: Negative for difficulty urinating and hematuria.   Musculoskeletal: Negative for neck pain.   Neurological: Negative for headaches.   Psychiatric/Behavioral: Negative for dysphoric mood.           Physical Exam  There were no vitals filed for this visit. There is no height or weight on file to calculate BMI.          Physical Exam  Constitutional:       Appearance: Normal appearance.   HENT:      Head: Normocephalic.      Nose: Nose normal.   Eyes:      Extraocular Movements: Extraocular movements intact.      Pupils: Pupils are equal, round, and reactive to light.   Cardiovascular:      Comments: Heart beating spontaneously   Pulmonary:      Effort: Pulmonary effort is normal.   Neurological:      Mental Status: She is alert.   Psychiatric:         Mood and Affect: Mood normal.           Health Maintenance       Date Due Completion Date    LDCT Lung Screen Never done ---    Colorectal Cancer Screening 11/01/2018 11/1/2017    Influenza Vaccine (1) 09/01/2021 11/19/2020    Shingles Vaccine (2 of 2) 10/05/2021 8/10/2021    Mammogram 02/19/2022 2/19/2021    High Dose Statin 02/01/2023 2/1/2022    DEXA SCAN 03/29/2024 3/29/2021    Lipid Panel 12/22/2025 12/22/2020    TETANUS VACCINE 05/10/2031 5/10/2021              Patient note was created using [employer].  Any errors in syntax or even information may not have been identified and edited on initial review prior to  signing this note.

## 2022-02-28 ENCOUNTER — PES CALL (OUTPATIENT)
Dept: ADMINISTRATIVE | Facility: CLINIC | Age: 72
End: 2022-02-28
Payer: MEDICARE

## 2022-03-07 DIAGNOSIS — R05.8 ACE-INHIBITOR COUGH: ICD-10-CM

## 2022-03-07 DIAGNOSIS — T46.4X5A ACE-INHIBITOR COUGH: ICD-10-CM

## 2022-03-07 DIAGNOSIS — I10 ESSENTIAL HYPERTENSION: ICD-10-CM

## 2022-03-07 DIAGNOSIS — E78.5 HYPERLIPIDEMIA, UNSPECIFIED HYPERLIPIDEMIA TYPE: ICD-10-CM

## 2022-03-07 NOTE — TELEPHONE ENCOUNTER
No new care gaps identified.  Powered by [employer]. Reference number: [id number].   3/07/2022 5:11:56 PM CST

## 2022-03-10 RX ORDER — NAPROXEN SODIUM 220 MG/1
81 TABLET, FILM COATED ORAL DAILY
Qty: 90 TABLET | Refills: 3 | Status: SHIPPED | OUTPATIENT
Start: 2022-03-10 | End: 2022-06-09 | Stop reason: SDUPTHER

## 2022-03-10 RX ORDER — IRBESARTAN AND HYDROCHLOROTHIAZIDE 150; 12.5 MG/1; MG/1
1 TABLET, FILM COATED ORAL DAILY
Qty: 90 TABLET | Refills: 3 | Status: SHIPPED | OUTPATIENT
Start: 2022-03-10 | End: 2022-06-09 | Stop reason: SDUPTHER

## 2022-03-10 RX ORDER — METOPROLOL SUCCINATE 25 MG/1
TABLET, EXTENDED RELEASE ORAL
Qty: 90 TABLET | Refills: 3 | Status: SHIPPED | OUTPATIENT
Start: 2022-03-10 | End: 2022-06-09 | Stop reason: SDUPTHER

## 2022-03-10 RX ORDER — ATORVASTATIN CALCIUM 80 MG/1
80 TABLET, FILM COATED ORAL NIGHTLY
Qty: 90 TABLET | Refills: 3 | Status: SHIPPED | OUTPATIENT
Start: 2022-03-10 | End: 2022-06-09 | Stop reason: SDUPTHER

## 2022-03-10 RX ORDER — SIMVASTATIN 20 MG/1
20 TABLET, FILM COATED ORAL NIGHTLY
Qty: 90 TABLET | Refills: 3 | Status: SHIPPED | OUTPATIENT
Start: 2022-03-10 | End: 2022-06-09 | Stop reason: SDUPTHER

## 2022-03-10 NOTE — TELEPHONE ENCOUNTER
This Rx Request does not qualify for assessment with the [facility]   Please review protocol details and the Care Due Message for extra clinical information    Reasons Rx Request may be deferred:  1. Labs/Vitals overdue  2. Labs/Vitals abnormal  3. Patient has been seen in the ED/Hospital since the last PCP visit  4. Medication is non-delegated  5. Medication associated diagnosis code is outside of scope  6. Provider is a non-participating provider  7. Visit criteria not met (Patient needs to be seen at least every 15 months by PCP)    Note composed:11:23 AM 03/10/2022

## 2022-04-07 ENCOUNTER — OFFICE VISIT (OUTPATIENT)
Dept: PSYCHIATRY | Facility: CLINIC | Age: 72
End: 2022-04-07
Payer: COMMERCIAL

## 2022-04-07 DIAGNOSIS — F32.5 MAJOR DEPRESSIVE DISORDER WITH SINGLE EPISODE, IN FULL REMISSION: Primary | ICD-10-CM

## 2022-04-07 PROCEDURE — 90833 PR PSYCHOTHERAPY W/PATIENT W/E&M, 30 MIN (ADD ON): ICD-10-PCS | Mod: 95,,, | Performed by: PSYCHIATRY & NEUROLOGY

## 2022-04-07 PROCEDURE — 1160F PR REVIEW ALL MEDS BY PRESCRIBER/CLIN PHARMACIST DOCUMENTED: ICD-10-PCS | Mod: CPTII,95,, | Performed by: PSYCHIATRY & NEUROLOGY

## 2022-04-07 PROCEDURE — 1160F RVW MEDS BY RX/DR IN RCRD: CPT | Mod: CPTII,95,, | Performed by: PSYCHIATRY & NEUROLOGY

## 2022-04-07 PROCEDURE — 1159F MED LIST DOCD IN RCRD: CPT | Mod: CPTII,95,, | Performed by: PSYCHIATRY & NEUROLOGY

## 2022-04-07 PROCEDURE — 90833 PSYTX W PT W E/M 30 MIN: CPT | Mod: 95,,, | Performed by: PSYCHIATRY & NEUROLOGY

## 2022-04-07 PROCEDURE — 99213 OFFICE O/P EST LOW 20 MIN: CPT | Mod: 95,,, | Performed by: PSYCHIATRY & NEUROLOGY

## 2022-04-07 PROCEDURE — 99213 PR OFFICE/OUTPT VISIT, EST, LEVL III, 20-29 MIN: ICD-10-PCS | Mod: 95,,, | Performed by: PSYCHIATRY & NEUROLOGY

## 2022-04-07 PROCEDURE — 1159F PR MEDICATION LIST DOCUMENTED IN MEDICAL RECORD: ICD-10-PCS | Mod: CPTII,95,, | Performed by: PSYCHIATRY & NEUROLOGY

## 2022-04-07 NOTE — PROGRESS NOTES
"Outpatient Psychiatry Follow-Up Visit (MD/NP)    4/7/2022 Phone visit. Patient does not know how to use the virtual system. (18" with 1" on meds and 17" for supportive counseling and update of history and mental status)  Clinical Status of Patient:  Outpatient (Ambulatory)    Chief Complaint:  Rosa Isela Pate is a 71 y.o. female who presents today for follow-up of depression.  Met with patient and no relatives present for interview.      Interval History and Content of Current Session:  Interim Events/Subjective Report/Content of Current Session: Patient states that "everything is OK". Patient is sad that she does not yet have a car for transportation. Patient is in good self control and not an active danger to self or others and has no thoughts of harming herself nor signs of radha or psychosis. Patient does retain some sense of enjoyment. Patient likes looking at TV. Patient discussed some financial stress which she is now resolving. Patient has a good support network in [place] where she is living. We again discussed my correction. Patient is still having a difficult time with her  who is not currently letting patient have her car and from whom she is estranged. Support was offered to patient re her current concerns. Patient also has some stress due to her current isolation due to the pandemic. Patient denies serious depression and has only mild symptoms due to her situations but she feels she is dealing with the situations as productively as possible. Patient reported some sleep difficulties also. Patient has an adequate appetite. Patient is determined to work through her problems and is optimistic re the future.    Psychotherapy:  · Target symptoms: depression, anxiety   · Why chosen therapy is appropriate versus another modality: relevant to diagnosis, patient responds to this modality, evidence based practice  · Outcome monitoring methods: self-report  · Therapeutic intervention type: supportive " psychotherapy  · Topics discussed/themes: isolation and sadness due to pandemic and personal situations, relationships difficulties  · The patient's response to the intervention is accepting. The patient's progress toward treatment goals is good.   · Duration of intervention: 18 minutes.    Review of Systems   · PSYCHIATRIC: Pertinant items are noted in the narrative.    Past Medical, Family and Social History: The patient's past medical, family and social history have been reviewed and updated as appropriate within the electronic medical record - see encounter notes.    Compliance: yes    Side effects: None    Risk Parameters:  Patient reports no suicidal ideation  Patient reports no homicidal ideation  Patient reports no self-injurious behavior  Patient reports no violent behavior    Exam (detailed: at least 9 elements; comprehensive: all 15 elements)   Constitutional  Vitals:  Most recent vital signs, dated less than 90 days prior to this appointment, were reviewed.   There were no vitals filed for this visit.Patient reports stable vital signs     General:  unremarkable, age appropriate, could not assess appearance     Musculoskeletal  Muscle Strength/Tone:  patient reports some loss of muscle tone and strength   Gait & Station:  non-ataxic, slow     Psychiatric  Speech:  no latency; no press, spontaneous   Mood & Affect:  anxious, sad  congruent and appropriate   Thought Process:  normal and logical   Associations:  intact   Thought Content:  normal, no suicidality, no homicidality, delusions, or paranoia   Insight:  has awareness of illness   Judgement: behavior is adequate to circumstances   Orientation:  grossly intact   Memory: intact for content of interview   Language: grossly intact   Attention Span & Concentration:  able to focus   Fund of Knowledge:  not tested     Assessment and Diagnosis   Status/Progress: Based on the examination today, the patient's problem(s) is/are adequately but not ideally  controlled.  New problems have not been presented today.   no new obstacles are not complicating management of the primary condition.  The working differential for this patient includes mild depression with anxiety.     General Impression: Patient has a stable mood and is still motivated to improve her condition and stabilize her independent living at this time. Her support network is good and patient is in good self control and pleased with her current level of progress and coping abilities.Patient is no longer clinically depressed, but has some anxiety due to her changing living situation which is well managed at this time.      ICD-10-CM ICD-9-CM   1. Major depressive disorder, single episode, severe without psychotic features  F32.2 296.23       Intervention/Counseling/Treatment Plan   · Medication Management: The risks and benefits of medication were discussed with the patient. Patient agrees to continue Buspar 5 mg bid.      Return to Clinic: Patient to schedule with new doctor due to my care home.

## 2022-05-23 ENCOUNTER — PATIENT MESSAGE (OUTPATIENT)
Dept: SMOKING CESSATION | Facility: CLINIC | Age: 72
End: 2022-05-23
Payer: MEDICARE

## 2022-05-25 ENCOUNTER — PES CALL (OUTPATIENT)
Dept: ADMINISTRATIVE | Facility: CLINIC | Age: 72
End: 2022-05-25
Payer: MEDICARE

## 2022-05-30 ENCOUNTER — HOSPITAL ENCOUNTER (EMERGENCY)
Facility: HOSPITAL | Age: 72
Discharge: HOME | End: 2022-05-30
Attending: EMERGENCY MEDICINE
Payer: MEDICARE

## 2022-05-30 VITALS
OXYGEN SATURATION: 98 % | DIASTOLIC BLOOD PRESSURE: 77 MMHG | SYSTOLIC BLOOD PRESSURE: 175 MMHG | RESPIRATION RATE: 23 BRPM | WEIGHT: 181 LBS | TEMPERATURE: 99 F | HEIGHT: 62 IN | HEART RATE: 75 BPM | BODY MASS INDEX: 33.31 KG/M2

## 2022-05-30 DIAGNOSIS — R42 DIZZINESS: ICD-10-CM

## 2022-05-30 DIAGNOSIS — R55 NEAR SYNCOPE: Primary | ICD-10-CM

## 2022-05-30 LAB
ALBUMIN SERPL BCP-MCNC: 3.9 G/DL (ref 3.5–5.2)
ALP SERPL-CCNC: 55 U/L (ref 55–135)
ALT SERPL W/O P-5'-P-CCNC: 35 U/L (ref 10–44)
ANION GAP SERPL CALC-SCNC: 10 MMOL/L (ref 8–16)
AST SERPL-CCNC: 32 U/L (ref 10–40)
BASOPHILS # BLD AUTO: 0.05 K/UL (ref 0–0.2)
BASOPHILS NFR BLD: 0.7 % (ref 0–1.9)
BILIRUB SERPL-MCNC: 0.6 MG/DL (ref 0.1–1)
BNP SERPL-MCNC: 11 PG/ML (ref 0–99)
BUN SERPL-MCNC: 19 MG/DL (ref 8–23)
CALCIUM SERPL-MCNC: 9.8 MG/DL (ref 8.7–10.5)
CHLORIDE SERPL-SCNC: 106 MMOL/L (ref 95–110)
CO2 SERPL-SCNC: 27 MMOL/L (ref 23–29)
CREAT SERPL-MCNC: 1.1 MG/DL (ref 0.5–1.4)
DIFFERENTIAL METHOD: NORMAL
EOSINOPHIL # BLD AUTO: 0.1 K/UL (ref 0–0.5)
EOSINOPHIL NFR BLD: 1.4 % (ref 0–8)
ERYTHROCYTE [DISTWIDTH] IN BLOOD BY AUTOMATED COUNT: 13.7 % (ref 11.5–14.5)
EST. GFR  (AFRICAN AMERICAN): 58 ML/MIN/1.73 M^2
EST. GFR  (NON AFRICAN AMERICAN): 51 ML/MIN/1.73 M^2
GLUCOSE SERPL-MCNC: 98 MG/DL (ref 70–110)
HCT VFR BLD AUTO: 39.4 % (ref 37–48.5)
HGB BLD-MCNC: 12.8 G/DL (ref 12–16)
IMM GRANULOCYTES # BLD AUTO: 0.02 K/UL (ref 0–0.04)
IMM GRANULOCYTES NFR BLD AUTO: 0.3 % (ref 0–0.5)
LYMPHOCYTES # BLD AUTO: 1.9 K/UL (ref 1–4.8)
LYMPHOCYTES NFR BLD: 24.8 % (ref 18–48)
MCH RBC QN AUTO: 30.7 PG (ref 27–31)
MCHC RBC AUTO-ENTMCNC: 32.5 G/DL (ref 32–36)
MCV RBC AUTO: 95 FL (ref 82–98)
MONOCYTES # BLD AUTO: 0.8 K/UL (ref 0.3–1)
MONOCYTES NFR BLD: 10.2 % (ref 4–15)
NEUTROPHILS # BLD AUTO: 4.8 K/UL (ref 1.8–7.7)
NEUTROPHILS NFR BLD: 62.6 % (ref 38–73)
NRBC BLD-RTO: 0 /100 WBC
PLATELET # BLD AUTO: 240 K/UL (ref 150–450)
PMV BLD AUTO: 9.9 FL (ref 9.2–12.9)
POTASSIUM SERPL-SCNC: 4.6 MMOL/L (ref 3.5–5.1)
PROT SERPL-MCNC: 7.7 G/DL (ref 6–8.4)
RBC # BLD AUTO: 4.17 M/UL (ref 4–5.4)
SODIUM SERPL-SCNC: 143 MMOL/L (ref 136–145)
TROPONIN I SERPL DL<=0.01 NG/ML-MCNC: 0.01 NG/ML (ref 0–0.03)
WBC # BLD AUTO: 7.61 K/UL (ref 3.9–12.7)

## 2022-05-30 PROCEDURE — 83880 ASSAY OF NATRIURETIC PEPTIDE: CPT | Performed by: EMERGENCY MEDICINE

## 2022-05-30 PROCEDURE — 85025 COMPLETE CBC W/AUTO DIFF WBC: CPT | Performed by: EMERGENCY MEDICINE

## 2022-05-30 PROCEDURE — 93010 EKG 12-LEAD: ICD-10-PCS | Mod: ,,, | Performed by: INTERNAL MEDICINE

## 2022-05-30 PROCEDURE — 93010 ELECTROCARDIOGRAM REPORT: CPT | Mod: ,,, | Performed by: INTERNAL MEDICINE

## 2022-05-30 PROCEDURE — 84484 ASSAY OF TROPONIN QUANT: CPT | Performed by: EMERGENCY MEDICINE

## 2022-05-30 PROCEDURE — 93005 ELECTROCARDIOGRAM TRACING: CPT

## 2022-05-30 PROCEDURE — 99285 EMERGENCY DEPT VISIT HI MDM: CPT | Mod: 25

## 2022-05-30 PROCEDURE — 25000003 PHARM REV CODE 250: Performed by: EMERGENCY MEDICINE

## 2022-05-30 PROCEDURE — 80053 COMPREHEN METABOLIC PANEL: CPT | Performed by: EMERGENCY MEDICINE

## 2022-05-30 RX ADMIN — BACITRACIN ZINC, NEOMYCIN, POLYMYXIN B 1 EACH: 400; 3.5; 5 OINTMENT TOPICAL at 11:05

## 2022-05-30 NOTE — ED PROVIDER NOTES
SCRIBE #1 NOTE: ISoledad, am scribing for, and in the presence of, Haroon Ascencio Jr., MD. I have scribed the HPI, ROS, and PEx    SCRIBE #2 NOTE: I, Singh Evangelista, am scribing for, and in the presence of,  Sandeep Conteh MD. I have scribed the remaining portions of the note not scribed by Scribe #1.      History     Chief Complaint   Patient presents with    Loss of Consciousness     Pt had syncopal episode while standing. Disoriented after episode but back to baseline.     Review of patient's allergies indicates:   Allergen Reactions    Enalapril Other (See Comments)     Ace cough         History of Present Illness     HPI    5/30/2022, 6:46 PM  History obtained from the Family member and patient      History of Present Illness: Rosa Isela Pate is a 71 y.o. female patient with a PMHx of HLD, HTN and a stroke who presents to the Emergency Department for evaluation of a fall which onset suddenly PTA. Pt reports that she tripped over a step onto the porch and fell. Pt's family member reports that pt had seizure like activity for 30 seconds right after the fall. No associated sxs. Patient denies any CP, SOB, LOC, weakness, fever, and all other sxs at this time. No further complaints or concerns at this time.       Arrival mode: Ambulance Service    PCP: Clarissa Arias MD        Past Medical History:  Past Medical History:   Diagnosis Date    Depression     Hyperlipidemia     Hypertension     Obesity     Tobacco dependence        Past Surgical History:  Past Surgical History:   Procedure Laterality Date    CHOLECYSTECTOMY      removal    COLONOSCOPY N/A 11/1/2017    Procedure: COLONOSCOPY;  Surgeon: Francisco Javier Bai MD;  Location: [facility];  Service: Endoscopy;  Laterality: N/A;    HYSTERECTOMY      MYOMECTOMY      removal    OOPHORECTOMY      TUBAL LIGATION           Family History:  Family History   Problem Relation Age of Onset    Hypertension Mother     Heart disease Father        Social  History:  Social History     Tobacco Use    Smoking status: Current Every Day Smoker     Packs/day: 1.00     Years: 30.00     Pack years: 30.00     Types: Cigarettes    Smokeless tobacco: Never Used   Substance and Sexual Activity    Alcohol use: Yes     Alcohol/week: 0.0 standard drinks    Drug use: No    Sexual activity: Yes     Partners: Male        Review of Systems     Review of Systems   Constitutional: Negative for fever.   HENT: Negative for sore throat.    Respiratory: Negative for shortness of breath.    Cardiovascular: Negative for chest pain.   Gastrointestinal: Negative for nausea.   Genitourinary: Negative for dysuria.   Musculoskeletal: Negative for back pain.   Skin: Negative for rash.   Neurological: Positive for seizures. Negative for weakness.        (-) LOC   Hematological: Does not bruise/bleed easily.   All other systems reviewed and are negative.       Physical Exam     Initial Vitals [05/30/22 1811]   BP Pulse Resp Temp SpO2   128/72 86 16 98.7 °F (37.1 °C) 97 %      MAP       --          Physical Exam  Nursing Notes and Vital Signs Reviewed.  Constitutional: Patient is in no acute distress. Well-developed and well-nourished.  Head: Atraumatic. Normocephalic.  Eyes: EOM intact. Conjunctivae are not pale. No scleral icterus.  ENT: Mucous membranes are moist. Oropharynx is clear and symmetric.    Neck: Supple. Full ROM.  Cardiovascular: Regular rate. Regular rhythm. No murmurs, rubs, or gallops. Distal pulses are 2+ and symmetric.  Pulmonary/Chest: No respiratory distress. Clear to auscultation bilaterally. No wheezing or rales.  Abdominal: Soft and non-distended.  There is no tenderness.  No rebound, guarding, or rigidity.   Musculoskeletal: Moves all extremities. No obvious deformities. No edema  Skin: Warm and dry.  Neurological:  Alert, awake, and appropriate.  Normal speech.  No acute focal neurological deficits are appreciated.  Psychiatric: Normal affect. Good eye contact.  "Appropriate in content.     ED Course   Procedures  ED Vital Signs:  Vitals:    05/30/22 1811 05/30/22 1947 05/30/22 2018 05/30/22 2021   BP: 128/72  (!) 130/93 (!) 143/61   Pulse: 86  (!) 58 (!) 58   Resp: 16      Temp: 98.7 °F (37.1 °C)      TempSrc: Oral      SpO2: 97%  98% 96%   Weight:  82.1 kg (181 lb)     Height: 5' 2" (1.575 m)       05/30/22 2023   BP: 128/66   Pulse: 77   Resp:    Temp:    TempSrc:    SpO2: 96%   Weight:    Height:        Abnormal Lab Results:  Labs Reviewed   COMPREHENSIVE METABOLIC PANEL - Abnormal; Notable for the following components:       Result Value    eGFR if  58 (*)     eGFR if non  51 (*)     All other components within normal limits   CBC W/ AUTO DIFFERENTIAL   TROPONIN I   B-TYPE NATRIURETIC PEPTIDE        All Lab Results:  Results for orders placed or performed during the hospital encounter of 05/30/22   CBC auto differential   Result Value Ref Range    WBC 7.61 3.90 - 12.70 K/uL    RBC 4.17 4.00 - 5.40 M/uL    Hemoglobin 12.8 12.0 - 16.0 g/dL    Hematocrit 39.4 37.0 - 48.5 %    MCV 95 82 - 98 fL    MCH 30.7 27.0 - 31.0 pg    MCHC 32.5 32.0 - 36.0 g/dL    RDW 13.7 11.5 - 14.5 %    Platelets 240 150 - 450 K/uL    MPV 9.9 9.2 - 12.9 fL    Immature Granulocytes 0.3 0.0 - 0.5 %    Gran # (ANC) 4.8 1.8 - 7.7 K/uL    Immature Grans (Abs) 0.02 0.00 - 0.04 K/uL    Lymph # 1.9 1.0 - 4.8 K/uL    Mono # 0.8 0.3 - 1.0 K/uL    Eos # 0.1 0.0 - 0.5 K/uL    Baso # 0.05 0.00 - 0.20 K/uL    nRBC 0 0 /100 WBC    Gran % 62.6 38.0 - 73.0 %    Lymph % 24.8 18.0 - 48.0 %    Mono % 10.2 4.0 - 15.0 %    Eosinophil % 1.4 0.0 - 8.0 %    Basophil % 0.7 0.0 - 1.9 %    Differential Method Automated    Comprehensive metabolic panel   Result Value Ref Range    Sodium 143 136 - 145 mmol/L    Potassium 4.6 3.5 - 5.1 mmol/L    Chloride 106 95 - 110 mmol/L    CO2 27 23 - 29 mmol/L    Glucose 98 70 - 110 mg/dL    BUN 19 8 - 23 mg/dL    Creatinine 1.1 0.5 - 1.4 mg/dL    Calcium 9.8 " 8.7 - 10.5 mg/dL    Total Protein 7.7 6.0 - 8.4 g/dL    Albumin 3.9 3.5 - 5.2 g/dL    Total Bilirubin 0.6 0.1 - 1.0 mg/dL    Alkaline Phosphatase 55 55 - 135 U/L    AST 32 10 - 40 U/L    ALT 35 10 - 44 U/L    Anion Gap 10 8 - 16 mmol/L    eGFR if African American 58 (A) >60 mL/min/1.73 m^2    eGFR if non African American 51 (A) >60 mL/min/1.73 m^2   Troponin I #1   Result Value Ref Range    Troponin I 0.009 0.000 - 0.026 ng/mL   BNP   Result Value Ref Range    BNP 11 0 - 99 pg/mL         Imaging Results:  Imaging Results          CT Head Without Contrast (Final result)  Result time 05/30/22 19:36:33    Final result by Tonny Garcia MD (05/30/22 19:36:33)                 Impression:      No acute intracranial CT abnormality.  Clinical correlation and further evaluation as warranted.    All CT scans at this facility are performed  using dose modulation techniques as appropriate to performed exam including the following:  automated exposure control; adjustment of mA and/or kV according to the patients size (this includes techniques or standardized protocols for targeted exams where dose is matched to indication/reason for exam: i.e. extremities or head);  iterative reconstruction technique.      Electronically signed by: Tonyn Garcia  Date:    05/30/2022  Time:    19:36             Narrative:    EXAMINATION:  CT HEAD WITHOUT CONTRAST    CLINICAL HISTORY:  Syncope, recurrent;    TECHNIQUE:  Low dose axial CT images obtained throughout the head without intravenous contrast. Sagittal and coronal reconstructions were performed.    COMPARISON:  Brain MRI 04/23/2021    FINDINGS:  Intracranial compartment:    Ventricles and sulci are normal in size for age without evidence of hydrocephalus. No extra-axial blood or fluid collections.    Mild microvascular ischemic change.  Left thalamic remote lacunar infarct.  No parenchymal mass, hemorrhage, edema or major vascular distribution infarct.    Skull/extracranial contents  (limited evaluation): No fracture. Mastoid air cells and paranasal sinuses are essentially clear.                               X-Ray Chest AP Portable (Final result)  Result time 05/30/22 19:17:14    Final result by Tonny Garcia MD (05/30/22 19:17:14)                 Impression:      No acute abnormality.      Electronically signed by: Tonny Garcia  Date:    05/30/2022  Time:    19:17             Narrative:    EXAMINATION:  XR CHEST AP PORTABLE    CLINICAL HISTORY:  Chest Pain;    TECHNIQUE:  Single frontal view of the chest was performed.    COMPARISON:  Multiple priors.    FINDINGS:  The lungs are clear, with normal appearance of pulmonary vasculature and no pleural effusion or pneumothorax.    The cardiac silhouette is normal in size. The hilar and mediastinal contours are unremarkable.    Degenerative change of the shoulders.                                 The EKG was ordered, reviewed, and independently interpreted by the ED provider.  Interpretation time: 18:37  Rate: 74 BPM  Rhythm: normal sinus rhythm with sinus arrhythmia  Interpretation: Voltage criteria for left ventricular hypertrophy. Septal infarct, age undetermined. No STEMI.             The Emergency Provider reviewed the vital signs and test results, which are outlined above.     ED Discussion     8:00 PM: Dr. Ascencio transfers care of patient to Dr. Conteh pending lab results.    11:09 PM:  I revaluated patient.  Patient back to baseline.  Discussed episode with patient and family.  Patient remembers stepping of over a deck steps that had troubles near and she then recalls getting dizzy and falling.  She does not think that she lost full consciousness.  She denies hitting her head.  No reports of seizure-like activity or postictal period.  No tongue biting or urinary incontinence.  No history of seizures.  Neuro exam normal.  NIH stroke scale 0. Discussed all ED results and addressed all questions.  Discussed follow-up and ER return  precautions.  Patient discharged home stable/asymptomatic condition         Medical Decision Making:   Clinical Tests:   Lab Tests: Ordered and Reviewed  Radiological Study: Ordered and Reviewed  Medical Tests: Ordered and Reviewed           ED Medication(s):  Medications   neomycin-bacitracnZn-polymyxnB packet (has no administration in time range)       New Prescriptions    No medications on file        Follow-up Information     Schedule an appointment as soon as possible for a visit  with Clarissa Arias MD.    Specialties: Family Medicine, Wound Care  Contact information:  [address] [facility] [facility]  [place] [address]  [phone]             [facility] - Emergency Dept..    Specialty: Emergency Medicine  Why: As needed, If symptoms worsen  Contact information:  [address] [facility] [address] [phone]                           Scribe Attestation:   Scribe #1: I performed the above scribed service and the documentation accurately describes the services I performed. I attest to the accuracy of the note.     Attending:   Physician Attestation Statement for Scribe #1: I, Haroon Ascencio Jr., MD, personally performed the services described in this documentation, as scribed by Soledad Alfaro, in my presence, and it is both accurate and complete.       Scribe Attestation:   Scribe #2: I performed the above scribed service and the documentation accurately describes the services I performed. I attest to the accuracy of the note.    Attending Attestation:           Physician Attestation for Scribe:    Physician Attestation Statement for Scribe #2: I, Sandeep Conteh MD, reviewed documentation, as scribed by Singh Evangelista in my presence, and it is both accurate and complete. I also acknowledge and confirm the content of the note done by Scribe #1.           Clinical Impression       ICD-10-CM ICD-9-CM   1. Near syncope  R55 780.2   2. Dizziness  R42 780.4       Disposition:   Disposition:  Discharged  Condition: Stable         Sandeep Conteh MD  05/30/22 9762

## 2022-05-31 NOTE — ED NOTES
Pt presents to ed s/p fall in yard pt states she tripped in yard and fell denies any LOC , pt alert and oriented x 3 vss Pt placed on Cardiac monitor blood pressure and pulse ox monitor

## 2022-06-09 ENCOUNTER — PATIENT MESSAGE (OUTPATIENT)
Dept: FAMILY MEDICINE | Facility: CLINIC | Age: 72
End: 2022-06-09
Payer: MEDICARE

## 2022-06-09 DIAGNOSIS — T46.4X5A ACE-INHIBITOR COUGH: ICD-10-CM

## 2022-06-09 DIAGNOSIS — R05.8 ACE-INHIBITOR COUGH: ICD-10-CM

## 2022-06-09 DIAGNOSIS — I10 ESSENTIAL HYPERTENSION: ICD-10-CM

## 2022-06-09 DIAGNOSIS — F32.2 MAJOR DEPRESSIVE DISORDER, SINGLE EPISODE, SEVERE WITHOUT PSYCHOTIC FEATURES: ICD-10-CM

## 2022-06-09 DIAGNOSIS — E78.5 HYPERLIPIDEMIA, UNSPECIFIED HYPERLIPIDEMIA TYPE: ICD-10-CM

## 2022-06-09 NOTE — TELEPHONE ENCOUNTER
Care Due:                  Date            Visit Type   Department     Provider  --------------------------------------------------------------------------------                                ESTABLISHED   [facility]                              PATIENT -    MED/ INTERNAL  Last Visit: 02-      VIRTUAL      MED/ PEDS      Clarissa Hodgson  Next Visit: None Scheduled  None         None Found                                                            Last  Test          Frequency    Reason                     Performed    Due Date  --------------------------------------------------------------------------------    Lipid Panel.  12 months..  atorvastatin, simvastatin  Not Found    Overdue    [employer] Embedded Care Gaps. Reference number: [id number]. 6/09/2022   5:46:59 PM CDT

## 2022-06-10 ENCOUNTER — PATIENT MESSAGE (OUTPATIENT)
Dept: FAMILY MEDICINE | Facility: CLINIC | Age: 72
End: 2022-06-10
Payer: MEDICARE

## 2022-06-10 DIAGNOSIS — E78.5 HYPERLIPIDEMIA, UNSPECIFIED HYPERLIPIDEMIA TYPE: ICD-10-CM

## 2022-06-10 DIAGNOSIS — T46.4X5A ACE-INHIBITOR COUGH: ICD-10-CM

## 2022-06-10 DIAGNOSIS — I10 ESSENTIAL HYPERTENSION: ICD-10-CM

## 2022-06-10 DIAGNOSIS — F32.2 MAJOR DEPRESSIVE DISORDER, SINGLE EPISODE, SEVERE WITHOUT PSYCHOTIC FEATURES: ICD-10-CM

## 2022-06-10 DIAGNOSIS — R05.8 ACE-INHIBITOR COUGH: ICD-10-CM

## 2022-06-10 RX ORDER — AMLODIPINE BESYLATE 5 MG/1
TABLET ORAL
Qty: 90 TABLET | Refills: 3 | Status: SHIPPED | OUTPATIENT
Start: 2022-06-10 | End: 2022-12-19 | Stop reason: SDUPTHER

## 2022-06-10 RX ORDER — ATORVASTATIN CALCIUM 80 MG/1
80 TABLET, FILM COATED ORAL NIGHTLY
Qty: 90 TABLET | Refills: 3 | Status: SHIPPED | OUTPATIENT
Start: 2022-06-10 | End: 2023-04-27 | Stop reason: SDUPTHER

## 2022-06-10 RX ORDER — IRBESARTAN AND HYDROCHLOROTHIAZIDE 150; 12.5 MG/1; MG/1
1 TABLET, FILM COATED ORAL DAILY
Qty: 90 TABLET | Refills: 3 | Status: SHIPPED | OUTPATIENT
Start: 2022-06-10 | End: 2023-12-04 | Stop reason: SDUPTHER

## 2022-06-10 RX ORDER — SIMVASTATIN 20 MG/1
20 TABLET, FILM COATED ORAL NIGHTLY
Qty: 90 TABLET | Refills: 3 | Status: SHIPPED | OUTPATIENT
Start: 2022-06-10 | End: 2022-12-19 | Stop reason: SDUPTHER

## 2022-06-10 RX ORDER — METOPROLOL SUCCINATE 25 MG/1
TABLET, EXTENDED RELEASE ORAL
Qty: 90 TABLET | Refills: 3 | Status: SHIPPED | OUTPATIENT
Start: 2022-06-10 | End: 2023-11-07 | Stop reason: SDUPTHER

## 2022-06-10 RX ORDER — NAPROXEN SODIUM 220 MG/1
81 TABLET, FILM COATED ORAL DAILY
Qty: 90 TABLET | Refills: 3 | Status: SHIPPED | OUTPATIENT
Start: 2022-06-10 | End: 2023-04-26 | Stop reason: SDUPTHER

## 2022-10-18 ENCOUNTER — PATIENT MESSAGE (OUTPATIENT)
Dept: FAMILY MEDICINE | Facility: CLINIC | Age: 72
End: 2022-10-18
Payer: MEDICARE

## 2022-10-27 ENCOUNTER — HOSPITAL ENCOUNTER (EMERGENCY)
Facility: HOSPITAL | Age: 72
Discharge: HOME | End: 2022-10-27
Attending: EMERGENCY MEDICINE
Payer: MEDICARE

## 2022-10-27 VITALS
HEART RATE: 70 BPM | BODY MASS INDEX: 30.36 KG/M2 | RESPIRATION RATE: 18 BRPM | TEMPERATURE: 99 F | HEIGHT: 62 IN | WEIGHT: 165 LBS | SYSTOLIC BLOOD PRESSURE: 165 MMHG | OXYGEN SATURATION: 98 % | DIASTOLIC BLOOD PRESSURE: 74 MMHG
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
## 2022-10-27 DIAGNOSIS — M48.02 CERVICAL STENOSIS OF SPINE: Primary | ICD-10-CM

## 2022-10-27 DIAGNOSIS — I63.9 STROKE: ICD-10-CM

## 2022-10-27 LAB
ALBUMIN SERPL BCP-MCNC: 3.7 G/DL (ref 3.5–5.2)
ALP SERPL-CCNC: 56 U/L (ref 55–135)
ALT SERPL W/O P-5'-P-CCNC: 24 U/L (ref 10–44)
ANION GAP SERPL CALC-SCNC: 7 MMOL/L (ref 8–16)
AST SERPL-CCNC: 20 U/L (ref 10–40)
BASOPHILS # BLD AUTO: 0.05 K/UL (ref 0–0.2)
BASOPHILS NFR BLD: 0.8 % (ref 0–1.9)
BILIRUB SERPL-MCNC: 0.6 MG/DL (ref 0.1–1)
BUN SERPL-MCNC: 13 MG/DL (ref 8–23)
CALCIUM SERPL-MCNC: 9.3 MG/DL (ref 8.7–10.5)
CHLORIDE SERPL-SCNC: 107 MMOL/L (ref 95–110)
CHOLEST SERPL-MCNC: 151 MG/DL (ref 120–199)
CHOLEST/HDLC SERPL: 3.4 {RATIO} (ref 2–5)
CO2 SERPL-SCNC: 27 MMOL/L (ref 23–29)
CREAT SERPL-MCNC: 1 MG/DL (ref 0.5–1.4)
CTP QC/QA: YES
DIFFERENTIAL METHOD: ABNORMAL
EOSINOPHIL # BLD AUTO: 0.2 K/UL (ref 0–0.5)
EOSINOPHIL NFR BLD: 2.3 % (ref 0–8)
ERYTHROCYTE [DISTWIDTH] IN BLOOD BY AUTOMATED COUNT: 13.6 % (ref 11.5–14.5)
EST. GFR  (NO RACE VARIABLE): 60 ML/MIN/1.73 M^2
GLUCOSE SERPL-MCNC: 100 MG/DL (ref 70–110)
HCT VFR BLD AUTO: 35.5 % (ref 37–48.5)
HDLC SERPL-MCNC: 44 MG/DL (ref 40–75)
HDLC SERPL: 29.1 % (ref 20–50)
HGB BLD-MCNC: 11.6 G/DL (ref 12–16)
IMM GRANULOCYTES # BLD AUTO: 0.01 K/UL (ref 0–0.04)
IMM GRANULOCYTES NFR BLD AUTO: 0.2 % (ref 0–0.5)
INR PPP: 1 (ref 0.8–1.2)
LDLC SERPL CALC-MCNC: 90 MG/DL (ref 63–159)
LYMPHOCYTES # BLD AUTO: 2.9 K/UL (ref 1–4.8)
LYMPHOCYTES NFR BLD: 45.3 % (ref 18–48)
MCH RBC QN AUTO: 30.8 PG (ref 27–31)
MCHC RBC AUTO-ENTMCNC: 32.7 G/DL (ref 32–36)
MCV RBC AUTO: 94 FL (ref 82–98)
MONOCYTES # BLD AUTO: 0.5 K/UL (ref 0.3–1)
MONOCYTES NFR BLD: 8.1 % (ref 4–15)
NEUTROPHILS # BLD AUTO: 2.8 K/UL (ref 1.8–7.7)
NEUTROPHILS NFR BLD: 43.3 % (ref 38–73)
NONHDLC SERPL-MCNC: 107 MG/DL
NRBC BLD-RTO: 0 /100 WBC
PLATELET # BLD AUTO: 204 K/UL (ref 150–450)
PMV BLD AUTO: 9.4 FL (ref 9.2–12.9)
POTASSIUM SERPL-SCNC: 3.7 MMOL/L (ref 3.5–5.1)
PROT SERPL-MCNC: 6.8 G/DL (ref 6–8.4)
PROTHROMBIN TIME: 10.8 SEC (ref 9–12.5)
RBC # BLD AUTO: 3.77 M/UL (ref 4–5.4)
SARS-COV-2 RDRP RESP QL NAA+PROBE: NEGATIVE
SODIUM SERPL-SCNC: 141 MMOL/L (ref 136–145)
TRIGL SERPL-MCNC: 85 MG/DL (ref 30–150)
TSH SERPL DL<=0.005 MIU/L-ACNC: 0.89 UIU/ML (ref 0.4–4)
WBC # BLD AUTO: 6.4 K/UL (ref 3.9–12.7)

## 2022-10-27 PROCEDURE — 84443 ASSAY THYROID STIM HORMONE: CPT | Performed by: EMERGENCY MEDICINE

## 2022-10-27 PROCEDURE — 80061 LIPID PANEL: CPT | Performed by: EMERGENCY MEDICINE

## 2022-10-27 PROCEDURE — 80053 COMPREHEN METABOLIC PANEL: CPT | Performed by: EMERGENCY MEDICINE

## 2022-10-27 PROCEDURE — 93010 ELECTROCARDIOGRAM REPORT: CPT | Mod: ,,, | Performed by: INTERNAL MEDICINE

## 2022-10-27 PROCEDURE — 85025 COMPLETE CBC W/AUTO DIFF WBC: CPT | Performed by: EMERGENCY MEDICINE

## 2022-10-27 PROCEDURE — 93010 EKG 12-LEAD: ICD-10-PCS | Mod: ,,, | Performed by: INTERNAL MEDICINE

## 2022-10-27 PROCEDURE — 85610 PROTHROMBIN TIME: CPT | Performed by: EMERGENCY MEDICINE

## 2022-10-27 PROCEDURE — 93005 ELECTROCARDIOGRAM TRACING: CPT

## 2022-10-27 PROCEDURE — 25500020 PHARM REV CODE 255: Performed by: EMERGENCY MEDICINE

## 2022-10-27 PROCEDURE — 99285 EMERGENCY DEPT VISIT HI MDM: CPT | Mod: 25

## 2022-10-27 PROCEDURE — 87635 SARS-COV-2 COVID-19 AMP PRB: CPT | Performed by: EMERGENCY MEDICINE

## 2022-10-27 RX ADMIN — IOHEXOL 75 ML: 350 INJECTION, SOLUTION INTRAVENOUS at 07:10

## 2022-10-27 NOTE — ED PROVIDER NOTES
SCRIBE #1 NOTE: IMarkel, am scribing for, and in the presence of,  Radha Rasmussen MD.         EM PHYSICIAN NOTE       This patient presents with a complaint of   Chief Complaint   Patient presents with    Transient Ischemic Attack     Pt c/o vision changes and right facial droop per pt's family. Pt states she has had a TIA 2 years ago and her symptoms are similar to what she experienced then. Pt had a fall yesterday, striking her head. Pt states symptoms have resolved since. Residual symptoms from TIA which occurred 2 years ago us STM loss.        HPI: Rosa Isela Pate is a 72 y.o. female with a PMHx of HTN, history of stroke with residual short term memory loss, depression who presents to the Emergency Department for evaluation of an acute, 2 minute long episode of dizziness with associated double vision that began while sitting down at 1500 today. Patient explains that she has not been eating or drinking well as she has a lot on her mind and is in the process of moving. She reports that yesterday she suffered a mechanical fall in which she tripped over a wooden pallet, broke the pallet with her right knee, and fell onto her right shoulder. She states that she struck the right side of her face on the ground, but denies any loss of consciousness. Patient endorses pain in her right shoulder and right knee. She explains that she smokes cigarettes and has a chronic cough. Patient denies any fever, chills, bowel/urinary complaints, or other associated symptoms. She states that she is not on any blood thinners. Patient notes that she has no known drug allergies.      REVIEW of PMH, SOC History and Family History:  Past Medical History:   Diagnosis Date    Depression     Hyperlipidemia     Hypertension     Obesity     Tobacco dependence      Social history noncontributory  Family history noncontributory    Review of patient's allergies indicates:   Allergen Reactions    Enalapril Other (See Comments)      "Ace cough           REVIEW of SYSTEMS  Source: Patient  The nurse's notes and triage vital signs were reviewed.  GENERAL/CONSTITUTIONAL: There is no report of fever, fatigue, weakness, or unexplained weight loss.  CARDIOVASCULAR: There is no report of chest pain   RESPIRATORY: There is no report of cough or SOB  GASTROINTESTINAL: There is no report of nausea, vomiting, diarrhea  MUSCULOSKELETAL: SEE HPI  NEUROLOGICAL: SEE HPI  SKIN AND BREASTS: There is no report of easy bruising, skin redness, skin rash.  HEMATOLOGIC/LYMPHATIC: There is no report of anemia, bleeding or clotting defects. There is no report of anticoagulant use.  The remainder of the ROS is negative.        PHYSICAL EXAMINATION    ED Triage Vitals [10/27/22 1618]   Enc Vitals Group      BP (!) 150/68      Pulse (!) 52      Resp 17      Temp 98.7 °F (37.1 °C)      Temp src Oral      SpO2 98 %      Weight 165 lb      Height 5' 2"      Head Circumference       Peak Flow       Pain Score       Pain Loc       Pain Edu?       Excl. in GC?      Vital signs and Pulse Ox reviewed in clinical context. Abnormalities noted: Hypertension noted and patient will be referred to primary care physician for close follow-up  Pt's level of consciousness is alert, and the patient is in mild distress.  Skin: warm, pink and dry.  Capillary refill is less than 2 seconds.  Mucosa:moist  Head and Neck: WNL  Cardiac exam: RRR  Pulmonary exam: unlabored and clear  Abd Exam: soft nontender   Musculoskeletal: no joint tenderness, deformity or swelling   Neurologic: GCS: GCS 15; 5 over 5 strength, cranial nerves intact, neck supple. No pronator drift. No visual field deficits or skew. No nystagmus.  No dysmetria.  Normal gait.  Normal sensation.       Initial Impression:  Reported Stroke-like symptoms, with normal neuro exam now  Plan:  Labs, imaging, reassess  Radha Rasmussen MD, 5:08 PM 10/27/2022      Medical decision making:   Nurses notes and Vital Signs reviewed.  Orders " Placed This Encounter   Procedures    CTA Head and Neck (xpd)    X-Ray Shoulder Trauma Right    X-Ray Knee 3 View Right    MRI Cervical Spine Without Contrast    CBC W/ AUTO DIFFERENTIAL    Comprehensive metabolic panel    Protime-INR    TSH    LDL - Lipid Panel    Ambulatory referral/consult to Neurology    Ambulatory referral/consult to [facility]    Diet NPO    Cardiac Monitoring - Adult    Vital signs    Neuro checks:  LOC, Orientation, GCS    Complete Lu Screening Assessment    Complete NIH Stroke Scale    Pulse Oximetry Continuous    Oxygen Continuous    POCT COVID-19 Rapid Screening    ECG 12 lead    Insert peripheral IV       ED Course as of 10/27/22 2025   Thu Oct 27, 2022   1926  CBC reveals mild anemia otherwise normal. []   1927 COVID negative []   1927 CMP normal []   1927 TSH normal []   1927 My independent interpretation of the EKG is normal sinus rhythm at a rate 60.  There is no ST segment elevation or depression concerning for acute occlusive infarct.  Normal axis normal intervals. []   1928 X-rays of the knee and shoulder are within normal limits []   2020    No acute abnormality. No high-grade stenosis or major vessel occlusion.     Severe cervical stenosis at C3-4, C4-5 and C5-6.  Correlate for myelopathy.     Consider MRI on non emergent basis. []      ED Course User Index  [] Radha Rasmussen MD       MDM     Amount and/or Complexity of Data Reviewed  Clinical lab tests: ordered and reviewed  Tests in the radiology section of CPT®: ordered and reviewed  Tests in the medicine section of CPT®: reviewed and ordered  Decide to obtain previous medical records or to obtain history from someone other than the patient: yes         Diagnoses that have been ruled out:   Acute ischemic and hemorrhagic stroke   Diagnoses that are still under consideration:   None   Final diagnoses:      Fall   Cervical stenosis of spine            Follow-up Information    None       ED  Prescriptions    None         This note was created using Dict[employer] Software.  This program may occasionally misinterpret certain words and phrases.      SCRIBE ATTESTATION NOTE:   I attest that I personally performed the services documented by the scribe and acknowledged and confirm the content of the note.   Nurses notes were reviewed.  Radha Rasmussen      Nurses notes were reviewed.      CRITICAL CARE TIME:  These services included the following: chart data review, reviewing nursing notes and researching old charts from internal and external sources, documentation time, consultant collaboration regarding findings and treatment options, medication orders and management, direct patient care, vital sign assessments, physical exam reassessments, and ordering, interpreting and reviewing diagnostic studies/lab tests.    Aggregate critical care time was approximately 20 minutes, which includes only time during which I was engaged in work directly related to the patient's care, as described above, whether at the bedside or elsewhere in the Emergency Department.  It did not include time spent performing other reported procedures or the services of residents, students, nurses or physician assistants.                 ED Disposition Condition    Discharge Stable                 Radha Rasmussen MD  10/27/22 2025

## 2022-11-08 ENCOUNTER — TELEPHONE (OUTPATIENT)
Dept: FAMILY MEDICINE | Facility: CLINIC | Age: 72
End: 2022-11-08
Payer: MEDICARE

## 2022-11-08 NOTE — TELEPHONE ENCOUNTER
states patient memory is worse sincestroke, she lives in [place] now , asking for any provider recommendations

## 2022-11-15 ENCOUNTER — PATIENT OUTREACH (OUTPATIENT)
Dept: ADMINISTRATIVE | Facility: HOSPITAL | Age: 72
End: 2022-11-15
Payer: MEDICARE

## 2022-11-15 ENCOUNTER — PATIENT MESSAGE (OUTPATIENT)
Dept: FAMILY MEDICINE | Facility: CLINIC | Age: 72
End: 2022-11-15
Payer: MEDICARE

## 2022-11-15 ENCOUNTER — PATIENT MESSAGE (OUTPATIENT)
Dept: ADMINISTRATIVE | Facility: HOSPITAL | Age: 72
End: 2022-11-15
Payer: MEDICARE

## 2022-11-15 RX ORDER — BNT162B2 ORIGINAL AND OMICRON BA.4/BA.5 .1125; .1125 MG/2.25ML; MG/2.25ML
INJECTION, SUSPENSION INTRAMUSCULAR
COMMUNITY
Start: 2022-09-24 | End: 2023-04-20

## 2022-11-15 NOTE — PROGRESS NOTES
[facility] Non-Compliant 10/07/22 gap report - unable to reach patient, no voicemail set up.    Overdue Mammogram - unable to reach patient, no voicemail set up.

## 2022-11-16 ENCOUNTER — PATIENT OUTREACH (OUTPATIENT)
Dept: ADMINISTRATIVE | Facility: HOSPITAL | Age: 72
End: 2022-11-16
Payer: MEDICARE

## 2022-11-16 ENCOUNTER — TELEPHONE (OUTPATIENT)
Dept: FAMILY MEDICINE | Facility: CLINIC | Age: 72
End: 2022-11-16
Payer: MEDICARE

## 2022-11-16 VITALS — SYSTOLIC BLOOD PRESSURE: 123 MMHG | DIASTOLIC BLOOD PRESSURE: 65 MMHG

## 2022-11-16 VITALS — DIASTOLIC BLOOD PRESSURE: 65 MMHG | SYSTOLIC BLOOD PRESSURE: 123 MMHG

## 2022-11-16 NOTE — PROGRESS NOTES
Incoming blood pressure readings received on 11/15/22 via the [employer] portal as followed:    /76 taken @ 06:24 pm right arm and pulse = 69  /65 taken @ 06:26 pm left arm and pulse = 71    BP's recorded.

## 2022-11-28 ENCOUNTER — PES CALL (OUTPATIENT)
Dept: ADMINISTRATIVE | Facility: CLINIC | Age: 72
End: 2022-11-28
Payer: MEDICARE

## 2022-12-19 ENCOUNTER — PATIENT MESSAGE (OUTPATIENT)
Dept: FAMILY MEDICINE | Facility: CLINIC | Age: 72
End: 2022-12-19

## 2022-12-19 DIAGNOSIS — E78.5 HYPERLIPIDEMIA, UNSPECIFIED HYPERLIPIDEMIA TYPE: ICD-10-CM

## 2022-12-19 DIAGNOSIS — F32.2 MAJOR DEPRESSIVE DISORDER, SINGLE EPISODE, SEVERE WITHOUT PSYCHOTIC FEATURES: ICD-10-CM

## 2022-12-19 RX ORDER — SIMVASTATIN 20 MG/1
20 TABLET, FILM COATED ORAL NIGHTLY
Qty: 90 TABLET | Refills: 3 | Status: SHIPPED | OUTPATIENT
Start: 2022-12-19 | End: 2023-04-26

## 2022-12-19 RX ORDER — AMLODIPINE BESYLATE 5 MG/1
TABLET ORAL
Qty: 90 TABLET | Refills: 3 | Status: SHIPPED | OUTPATIENT
Start: 2022-12-19 | End: 2023-01-10 | Stop reason: SDUPTHER

## 2022-12-19 NOTE — TELEPHONE ENCOUNTER
No new care gaps identified.  [facility] Embedded Care Gaps. Reference number: [id number]. 12/19/2022   7:18:05 AM CST

## 2023-01-10 DIAGNOSIS — F32.2 MAJOR DEPRESSIVE DISORDER, SINGLE EPISODE, SEVERE WITHOUT PSYCHOTIC FEATURES: ICD-10-CM

## 2023-01-10 RX ORDER — AMLODIPINE BESYLATE 5 MG/1
TABLET ORAL
Qty: 90 TABLET | Refills: 3 | Status: SHIPPED | OUTPATIENT
Start: 2023-01-10 | End: 2023-02-06 | Stop reason: SDUPTHER

## 2023-01-10 NOTE — TELEPHONE ENCOUNTER
No new care gaps identified.  [facility] Embedded Care Gaps. Reference number: [id number]. 1/10/2023   10:26:08 AM CST

## 2023-02-06 DIAGNOSIS — F32.2 MAJOR DEPRESSIVE DISORDER, SINGLE EPISODE, SEVERE WITHOUT PSYCHOTIC FEATURES: ICD-10-CM

## 2023-02-06 RX ORDER — AMLODIPINE BESYLATE 5 MG/1
TABLET ORAL
Qty: 90 TABLET | Refills: 3 | Status: SHIPPED | OUTPATIENT
Start: 2023-02-06 | End: 2023-02-23 | Stop reason: SDUPTHER

## 2023-02-06 NOTE — TELEPHONE ENCOUNTER
No new care gaps identified.  [facility] Embedded Care Gaps. Reference number: [id number]. 2/06/2023   10:29:44 AM CST

## 2023-02-06 NOTE — TELEPHONE ENCOUNTER
Refill Routing Note   Medication(s) are not appropriate for processing by [facility] for the following reason(s):       ED/Hospital Visit since last OV with PCP    [facility] action(s):  Defer                   Appointments  past 12m or future 3m with PCP    Date Provider   Last Visit   2/9/2022 Clarissa Arias MD   Next Visit   Visit date not found Clarissa Arias MD   ED visits in past 90 days: 0        Note composed:11:32 AM 02/06/2023

## 2023-02-13 ENCOUNTER — PATIENT MESSAGE (OUTPATIENT)
Dept: FAMILY MEDICINE | Facility: CLINIC | Age: 73
End: 2023-02-13

## 2023-02-23 DIAGNOSIS — F32.2 MAJOR DEPRESSIVE DISORDER, SINGLE EPISODE, SEVERE WITHOUT PSYCHOTIC FEATURES: ICD-10-CM

## 2023-02-23 RX ORDER — AMLODIPINE BESYLATE 5 MG/1
TABLET ORAL
Qty: 90 TABLET | Refills: 3 | Status: SHIPPED | OUTPATIENT
Start: 2023-02-23 | End: 2023-03-01 | Stop reason: SDUPTHER

## 2023-02-23 NOTE — TELEPHONE ENCOUNTER
Care Due:                  Date            Visit Type   Department     Provider  --------------------------------------------------------------------------------                                ESTABLISHED   [facility]                              PATIENT -    MED/ INTERNAL  Last Visit: 02-      VIRTUAL      MED/ PEDS      Clarissa Hodgson  Next Visit: None Scheduled  None         None Found                                                            Last  Test          Frequency    Reason                     Performed    Due Date  --------------------------------------------------------------------------------    Office Visit  12 months..  atorvastatin,              02- 02-                             irbesartan-hydrochlorothi                             azide, simvastatin.......    [employer] Embedded Care Gaps. Reference number: [id number]. 2/23/2023   2:17:24 PM CST

## 2023-02-25 ENCOUNTER — PATIENT MESSAGE (OUTPATIENT)
Dept: FAMILY MEDICINE | Facility: CLINIC | Age: 73
End: 2023-02-25

## 2023-02-27 RX ORDER — BUSPIRONE HYDROCHLORIDE 5 MG/1
5 TABLET ORAL 2 TIMES DAILY
Qty: 60 TABLET | Refills: 5 | Status: SHIPPED | OUTPATIENT
Start: 2023-02-27 | End: 2023-04-26

## 2023-03-01 DIAGNOSIS — F32.2 MAJOR DEPRESSIVE DISORDER, SINGLE EPISODE, SEVERE WITHOUT PSYCHOTIC FEATURES: ICD-10-CM

## 2023-03-01 RX ORDER — AMLODIPINE BESYLATE 5 MG/1
TABLET ORAL
Qty: 90 TABLET | Refills: 3 | Status: SHIPPED | OUTPATIENT
Start: 2023-03-01 | End: 2023-08-09 | Stop reason: SDUPTHER

## 2023-03-01 NOTE — TELEPHONE ENCOUNTER
No new care gaps identified.  [facility] Embedded Care Gaps. Reference number: [id number]. 3/01/2023   8:54:09 AM CST

## 2023-03-04 ENCOUNTER — PATIENT MESSAGE (OUTPATIENT)
Dept: FAMILY MEDICINE | Facility: CLINIC | Age: 73
End: 2023-03-04

## 2023-03-09 ENCOUNTER — PES CALL (OUTPATIENT)
Dept: ADMINISTRATIVE | Facility: CLINIC | Age: 73
End: 2023-03-09

## 2023-04-18 ENCOUNTER — OFFICE VISIT (OUTPATIENT)
Dept: INTERNAL MEDICINE | Facility: CLINIC | Age: 73
End: 2023-04-18
Payer: MEDICARE

## 2023-04-18 VITALS
HEIGHT: 63 IN | SYSTOLIC BLOOD PRESSURE: 120 MMHG | OXYGEN SATURATION: 97 % | DIASTOLIC BLOOD PRESSURE: 72 MMHG | WEIGHT: 169.75 LBS | HEART RATE: 56 BPM | BODY MASS INDEX: 30.08 KG/M2

## 2023-04-18 DIAGNOSIS — E78.5 HYPERLIPIDEMIA, UNSPECIFIED HYPERLIPIDEMIA TYPE: ICD-10-CM

## 2023-04-18 DIAGNOSIS — D64.9 ANEMIA, UNSPECIFIED TYPE: ICD-10-CM

## 2023-04-18 DIAGNOSIS — I10 HYPERTENSION, UNSPECIFIED TYPE: ICD-10-CM

## 2023-04-18 DIAGNOSIS — R20.0 NUMBNESS AND TINGLING: ICD-10-CM

## 2023-04-18 DIAGNOSIS — R20.2 NUMBNESS AND TINGLING: ICD-10-CM

## 2023-04-18 DIAGNOSIS — Z00.00 ENCOUNTER FOR PREVENTIVE HEALTH EXAMINATION: Primary | ICD-10-CM

## 2023-04-18 DIAGNOSIS — Z72.0 TOBACCO USE: ICD-10-CM

## 2023-04-18 DIAGNOSIS — Z12.31 ENCOUNTER FOR SCREENING MAMMOGRAM FOR BREAST CANCER: ICD-10-CM

## 2023-04-18 DIAGNOSIS — Z86.010 PERSONAL HISTORY OF COLONIC POLYPS: ICD-10-CM

## 2023-04-18 DIAGNOSIS — R41.841 COGNITIVE COMMUNICATION DISORDER: ICD-10-CM

## 2023-04-18 DIAGNOSIS — Z59.9 FINANCIAL DIFFICULTIES: ICD-10-CM

## 2023-04-18 DIAGNOSIS — R41.3 MEMORY DIFFICULTIES: ICD-10-CM

## 2023-04-18 DIAGNOSIS — F32.0 CURRENT MILD EPISODE OF MAJOR DEPRESSIVE DISORDER, UNSPECIFIED WHETHER RECURRENT: ICD-10-CM

## 2023-04-18 PROCEDURE — 1160F RVW MEDS BY RX/DR IN RCRD: CPT | Mod: HCNC,CPTII,S$GLB, | Performed by: NURSE PRACTITIONER

## 2023-04-18 PROCEDURE — 3288F FALL RISK ASSESSMENT DOCD: CPT | Mod: HCNC,CPTII,S$GLB, | Performed by: NURSE PRACTITIONER

## 2023-04-18 PROCEDURE — 1126F PR PAIN SEVERITY QUANTIFIED, NO PAIN PRESENT: ICD-10-PCS | Mod: HCNC,CPTII,S$GLB, | Performed by: NURSE PRACTITIONER

## 2023-04-18 PROCEDURE — G0439 PPPS, SUBSEQ VISIT: HCPCS | Mod: HCNC,S$GLB,, | Performed by: NURSE PRACTITIONER

## 2023-04-18 PROCEDURE — 3078F PR MOST RECENT DIASTOLIC BLOOD PRESSURE < 80 MM HG: ICD-10-PCS | Mod: HCNC,CPTII,S$GLB, | Performed by: NURSE PRACTITIONER

## 2023-04-18 PROCEDURE — 1100F PTFALLS ASSESS-DOCD GE2>/YR: CPT | Mod: HCNC,CPTII,S$GLB, | Performed by: NURSE PRACTITIONER

## 2023-04-18 PROCEDURE — 3288F PR FALLS RISK ASSESSMENT DOCUMENTED: ICD-10-PCS | Mod: HCNC,CPTII,S$GLB, | Performed by: NURSE PRACTITIONER

## 2023-04-18 PROCEDURE — 1170F FXNL STATUS ASSESSED: CPT | Mod: HCNC,CPTII,S$GLB, | Performed by: NURSE PRACTITIONER

## 2023-04-18 PROCEDURE — 99999 PR PBB SHADOW E&M-EST. PATIENT-LVL V: ICD-10-PCS | Mod: PBBFAC,HCNC,, | Performed by: NURSE PRACTITIONER

## 2023-04-18 PROCEDURE — 1160F PR REVIEW ALL MEDS BY PRESCRIBER/CLIN PHARMACIST DOCUMENTED: ICD-10-PCS | Mod: HCNC,CPTII,S$GLB, | Performed by: NURSE PRACTITIONER

## 2023-04-18 PROCEDURE — G0439 PR MEDICARE ANNUAL WELLNESS SUBSEQUENT VISIT: ICD-10-PCS | Mod: HCNC,S$GLB,, | Performed by: NURSE PRACTITIONER

## 2023-04-18 PROCEDURE — 1170F PR FUNCTIONAL STATUS ASSESSED: ICD-10-PCS | Mod: HCNC,CPTII,S$GLB, | Performed by: NURSE PRACTITIONER

## 2023-04-18 PROCEDURE — 1159F PR MEDICATION LIST DOCUMENTED IN MEDICAL RECORD: ICD-10-PCS | Mod: HCNC,CPTII,S$GLB, | Performed by: NURSE PRACTITIONER

## 2023-04-18 PROCEDURE — 3074F SYST BP LT 130 MM HG: CPT | Mod: HCNC,CPTII,S$GLB, | Performed by: NURSE PRACTITIONER

## 2023-04-18 PROCEDURE — 3008F BODY MASS INDEX DOCD: CPT | Mod: HCNC,CPTII,S$GLB, | Performed by: NURSE PRACTITIONER

## 2023-04-18 PROCEDURE — 1126F AMNT PAIN NOTED NONE PRSNT: CPT | Mod: HCNC,CPTII,S$GLB, | Performed by: NURSE PRACTITIONER

## 2023-04-18 PROCEDURE — 1159F MED LIST DOCD IN RCRD: CPT | Mod: HCNC,CPTII,S$GLB, | Performed by: NURSE PRACTITIONER

## 2023-04-18 PROCEDURE — 99999 PR PBB SHADOW E&M-EST. PATIENT-LVL V: CPT | Mod: PBBFAC,HCNC,, | Performed by: NURSE PRACTITIONER

## 2023-04-18 PROCEDURE — 3008F PR BODY MASS INDEX (BMI) DOCUMENTED: ICD-10-PCS | Mod: HCNC,CPTII,S$GLB, | Performed by: NURSE PRACTITIONER

## 2023-04-18 PROCEDURE — 3078F DIAST BP <80 MM HG: CPT | Mod: HCNC,CPTII,S$GLB, | Performed by: NURSE PRACTITIONER

## 2023-04-18 PROCEDURE — 3074F PR MOST RECENT SYSTOLIC BLOOD PRESSURE < 130 MM HG: ICD-10-PCS | Mod: HCNC,CPTII,S$GLB, | Performed by: NURSE PRACTITIONER

## 2023-04-18 PROCEDURE — 1100F PR PT FALLS ASSESS DOC 2+ FALLS/FALL W/INJURY/YR: ICD-10-PCS | Mod: HCNC,CPTII,S$GLB, | Performed by: NURSE PRACTITIONER

## 2023-04-18 SDOH — SOCIAL DETERMINANTS OF HEALTH (SDOH): PROBLEM RELATED TO HOUSING AND ECONOMIC CIRCUMSTANCES, UNSPECIFIED: Z59.9

## 2023-04-18 NOTE — PROGRESS NOTES
"  Rosa Isela Pate presented for a  Medicare AWV and comprehensive Health Risk Assessment today. The following components were reviewed and updated:    Medical history  Family History  Social history  Allergies and Current Medications  Health Risk Assessment  Health Maintenance  Care Team         ** See Completed Assessments for Annual Wellness Visit within the encounter summary.**         The following assessments were completed:  Living Situation  CAGE  Depression Screening  Timed Get Up and Go  Whisper Test  Cognitive Function Screening  Nutrition Screening  ADL Screening  PAQ Screening        Vitals:    04/18/23 0829   BP: 120/72   Pulse: (!) 56   SpO2: 97%   Weight: 77 kg (169 lb 12.1 oz)   Height: 5' 3.19" (1.605 m)     Body mass index is 29.89 kg/m².  Physical Exam  Vitals and nursing note reviewed.   Constitutional:       Appearance: She is well-developed.   HENT:      Head: Normocephalic.   Cardiovascular:      Rate and Rhythm: Normal rate and regular rhythm.      Heart sounds: Normal heart sounds.   Pulmonary:      Effort: Pulmonary effort is normal. No respiratory distress.      Breath sounds: Decreased breath sounds present.   Abdominal:      Palpations: Abdomen is soft. There is no mass.      Tenderness: There is no abdominal tenderness.   Musculoskeletal:         General: Normal range of motion.   Skin:     General: Skin is warm and dry.   Neurological:      Mental Status: She is alert and oriented to person, place, and time.      Motor: No abnormal muscle tone.   Psychiatric:         Speech: Speech normal.         Behavior: Behavior normal.             Diagnoses and health risks identified today and associated recommendations/orders:    1. Encounter for preventive health examination     Review for Opioid Screening: Pt does not have Rx for Opioids      Review for Substance Use Disorders: Alcohol use, see flow sheet for detail No drug use per chart      Advised to bring medications to next appointment so " that chart can be updated appropriately Pt and  (present throughout visit) verbalized understanding.   Reports she is at her respiratory baseline    Scheduled  PC  Optometry     Discussed receiving Shingrix at pharmacy.    Declines flu vaccine at this time.   She will discuss lung cancer screening with PC  Encouraged healthy diet and exercise as tolerated    2. Current mild episode of major depressive disorder, unspecified whether recurrent  PHQ 9-9  Denies SI  Reports stress  Discussed counseling. Psychiatry department contact information given to patient.   Advised to follow up with PCP/psychiatry for further evaluation and recommendations. Patient expressed understanding.    - Ambulatory referral/consult to Psychiatry; Future    3. Cognitive communication disorder  Has seen neurology in past  Continue current treatment plan as previously prescribed with your  neurologist.     4. Hypertension, unspecified type  Continue current treatment plan as previously prescribed with your  pcp     5. Hyperlipidemia, unspecified hyperlipidemia type  Continue current treatment plan as previously prescribed with your  pcp     6. Tobacco use  Discussed the importance of smoking cessation and advised to quit smoking. Patient expressed understanding.   - Ambulatory referral/consult to Smoking Cessation Program; Future    7. Anemia, unspecified type  Advised to follow up with PCP for further evaluation and recommendations. Patient expressed understanding.      8. Financial difficulties  [facility] financial resources information page given to patient.    - Ambulatory referral/consult to Outpatient Case Management    9. Numbness and tingling  BUE  Chronic  Advised to follow up with PCP for further evaluation and recommendations. Patient expressed understanding.      10. Memory difficulties  Reported per pt  Abnormal cognitive function screening.    Advised to follow up with PCP for further evaluation and recommendations. Patient  expressed understanding.      11. Encounter for screening mammogram for breast cancer  - Mammo Digital Screening Bilat w/ Tobi; Future    12. Personal history of colonic polyps  - Ambulatory referral/consult to Endo Procedure ; Future     normal timed get up and go test. Reports 2+ falls in the last 12 months.   Fall precautions reviewed with patient. Advised to follow up with PCP for further recommendations. Patient expressed understanding.       Provided Rosa Isela with a 5-10 year written screening schedule and personal prevention plan. Recommendations were developed using the USPSTF age appropriate recommendations. Education, counseling, and referrals were provided as needed. After Visit Summary printed and given to patient which includes a list of additional screenings\tests needed.    Follow up in about 1 year (around 4/18/2024) for awv.    Samantha Beard NP  I offered to discuss advanced care planning, including how to pick a person who would make decisions for you if you were unable to make them for yourself, called a health care power of , and what kind of decisions you might make such as use of life sustaining treatments such as ventilators and tube feeding when faced with a life limiting illness recorded on a living will that they will need to know. (How you want to be cared for as you near the end of your natural life)     X Patient is interested in learning more about how to make advanced directives.  I provided them paperwork and offered to discuss this with them.

## 2023-04-18 NOTE — PATIENT INSTRUCTIONS
Counseling and Referral of Other Preventative  (Italic type indicates deductible and co-insurance are waived)    Patient Name: Rosa Isela Pate  Today's Date: 4/18/2023    Health Maintenance       Date Due Completion Date    LDCT Lung Screen Never done ---    Colorectal Cancer Screening 11/01/2018 11/1/2017    Shingles Vaccine (2 of 2) 10/05/2021 8/10/2021    Mammogram 02/19/2022 2/19/2021    Influenza Vaccine (1) 06/30/2023 (Originally 9/1/2022) 11/19/2020    High Dose Statin 12/19/2023 12/19/2022    DEXA Scan 03/29/2024 3/29/2021    Lipid Panel 10/27/2027 10/27/2022    TETANUS VACCINE 05/10/2031 5/10/2021        Orders Placed This Encounter   Procedures    Mammo Digital Screening Bilat w/ Tobi    Ambulatory referral/consult to Outpatient Case Management    Ambulatory referral/consult to Psychiatry    Ambulatory referral/consult to Smoking Cessation Program     The following information is provided to all patients.  This information is to help you find resources for any of the problems found today that may be affecting your health:                Living healthy guide: www.[facility].[place].gov      Understanding Diabetes: www.diabetes.org      Eating healthy: www.cdc.gov/healthyweight      CDC home safety checklist: www.cdc.gov/steadi/patient.html      Agency on Aging: www.goea.[place].[facility]      Alcoholics anonymous (AA): www.aa.org      Physical Activity: www.efren.nih.gov/rg1ewpf      Tobacco use: www.quitwithusla.org

## 2023-04-19 ENCOUNTER — PATIENT MESSAGE (OUTPATIENT)
Dept: OPHTHALMOLOGY | Facility: CLINIC | Age: 73
End: 2023-04-19

## 2023-04-19 ENCOUNTER — OFFICE VISIT (OUTPATIENT)
Dept: OPHTHALMOLOGY | Facility: CLINIC | Age: 73
End: 2023-04-19
Payer: MEDICARE

## 2023-04-19 DIAGNOSIS — H25.13 CATARACT, NUCLEAR SCLEROTIC SENILE, BILATERAL: Primary | ICD-10-CM

## 2023-04-19 DIAGNOSIS — I10 ESSENTIAL HYPERTENSION: ICD-10-CM

## 2023-04-19 DIAGNOSIS — H52.7 REFRACTIVE ERRORS: ICD-10-CM

## 2023-04-19 PROCEDURE — 1159F MED LIST DOCD IN RCRD: CPT | Mod: HCNC,CPTII,S$GLB, | Performed by: OPTOMETRIST

## 2023-04-19 PROCEDURE — 92004 COMPRE OPH EXAM NEW PT 1/>: CPT | Mod: HCNC,S$GLB,, | Performed by: OPTOMETRIST

## 2023-04-19 PROCEDURE — 99999 PR PBB SHADOW E&M-EST. PATIENT-LVL III: ICD-10-PCS | Mod: PBBFAC,HCNC,, | Performed by: OPTOMETRIST

## 2023-04-19 PROCEDURE — 92015 DETERMINE REFRACTIVE STATE: CPT | Mod: HCNC,S$GLB,, | Performed by: OPTOMETRIST

## 2023-04-19 PROCEDURE — 1159F PR MEDICATION LIST DOCUMENTED IN MEDICAL RECORD: ICD-10-PCS | Mod: HCNC,CPTII,S$GLB, | Performed by: OPTOMETRIST

## 2023-04-19 PROCEDURE — 92015 PR REFRACTION: ICD-10-PCS | Mod: HCNC,S$GLB,, | Performed by: OPTOMETRIST

## 2023-04-19 PROCEDURE — 92004 PR EYE EXAM, NEW PATIENT,COMPREHESV: ICD-10-PCS | Mod: HCNC,S$GLB,, | Performed by: OPTOMETRIST

## 2023-04-19 PROCEDURE — 99999 PR PBB SHADOW E&M-EST. PATIENT-LVL III: CPT | Mod: PBBFAC,HCNC,, | Performed by: OPTOMETRIST

## 2023-04-19 NOTE — PROGRESS NOTES
HPI    Blurred vision sometimes  New patient last eye exam not sure.  Update glasses RX.  Patient wears OTC readers    Last edited by Nellie Lentz MA on 4/19/2023  9:36 AM.            Assessment /Plan     For exam results, see Encounter Report.    Cataract, nuclear sclerotic senile, bilateral    Essential hypertension    Refractive errors      Moderate cataracts OU, not surgical  Follow annually.    No HTN Retinopathy    Dispense Final Rx for glasses.  RTC 1 year  Discussed above and answered questions.

## 2023-04-20 ENCOUNTER — LAB VISIT (OUTPATIENT)
Dept: LAB | Facility: HOSPITAL | Age: 73
End: 2023-04-20
Payer: MEDICARE

## 2023-04-20 ENCOUNTER — OFFICE VISIT (OUTPATIENT)
Dept: INTERNAL MEDICINE | Facility: CLINIC | Age: 73
End: 2023-04-20
Payer: MEDICARE

## 2023-04-20 VITALS
HEART RATE: 56 BPM | SYSTOLIC BLOOD PRESSURE: 124 MMHG | OXYGEN SATURATION: 96 % | TEMPERATURE: 98 F | HEIGHT: 63 IN | DIASTOLIC BLOOD PRESSURE: 60 MMHG | BODY MASS INDEX: 30.28 KG/M2 | WEIGHT: 170.88 LBS

## 2023-04-20 DIAGNOSIS — R41.841 COGNITIVE COMMUNICATION DISORDER: ICD-10-CM

## 2023-04-20 DIAGNOSIS — Z87.891 PERSONAL HISTORY OF NICOTINE DEPENDENCE: ICD-10-CM

## 2023-04-20 DIAGNOSIS — R73.03 PRE-DIABETES: ICD-10-CM

## 2023-04-20 DIAGNOSIS — F43.20 ADJUSTMENT DISORDER, UNSPECIFIED TYPE: ICD-10-CM

## 2023-04-20 DIAGNOSIS — E78.5 HYPERLIPIDEMIA, UNSPECIFIED HYPERLIPIDEMIA TYPE: ICD-10-CM

## 2023-04-20 DIAGNOSIS — F17.200 TOBACCO USE DISORDER: ICD-10-CM

## 2023-04-20 DIAGNOSIS — F32.5 MAJOR DEPRESSIVE DISORDER WITH SINGLE EPISODE, IN FULL REMISSION: Primary | ICD-10-CM

## 2023-04-20 DIAGNOSIS — R41.3 MEMORY LOSS: ICD-10-CM

## 2023-04-20 DIAGNOSIS — I63.81 LACUNAR INFARCTION: ICD-10-CM

## 2023-04-20 DIAGNOSIS — I10 HYPERTENSION, UNSPECIFIED TYPE: ICD-10-CM

## 2023-04-20 DIAGNOSIS — N18.31 CHRONIC KIDNEY DISEASE, STAGE 3A: ICD-10-CM

## 2023-04-20 DIAGNOSIS — E66.09 CLASS 1 OBESITY DUE TO EXCESS CALORIES WITH SERIOUS COMORBIDITY AND BODY MASS INDEX (BMI) OF 30.0 TO 30.9 IN ADULT: ICD-10-CM

## 2023-04-20 LAB
ALBUMIN SERPL BCP-MCNC: 3.9 G/DL (ref 3.5–5.2)
ALP SERPL-CCNC: 65 U/L (ref 55–135)
ALT SERPL W/O P-5'-P-CCNC: 23 U/L (ref 10–44)
ANION GAP SERPL CALC-SCNC: 12 MMOL/L (ref 8–16)
AST SERPL-CCNC: 17 U/L (ref 10–40)
BASOPHILS # BLD AUTO: 0.05 K/UL (ref 0–0.2)
BASOPHILS NFR BLD: 0.7 % (ref 0–1.9)
BILIRUB SERPL-MCNC: 0.7 MG/DL (ref 0.1–1)
BUN SERPL-MCNC: 19 MG/DL (ref 8–23)
CALCIUM SERPL-MCNC: 9.7 MG/DL (ref 8.7–10.5)
CHLORIDE SERPL-SCNC: 104 MMOL/L (ref 95–110)
CHOLEST SERPL-MCNC: 146 MG/DL (ref 120–199)
CHOLEST/HDLC SERPL: 3.2 {RATIO} (ref 2–5)
CO2 SERPL-SCNC: 26 MMOL/L (ref 23–29)
CREAT SERPL-MCNC: 1 MG/DL (ref 0.5–1.4)
DIFFERENTIAL METHOD: ABNORMAL
EOSINOPHIL # BLD AUTO: 0.1 K/UL (ref 0–0.5)
EOSINOPHIL NFR BLD: 1.9 % (ref 0–8)
ERYTHROCYTE [DISTWIDTH] IN BLOOD BY AUTOMATED COUNT: 13.6 % (ref 11.5–14.5)
EST. GFR  (NO RACE VARIABLE): 59.9 ML/MIN/1.73 M^2
ESTIMATED AVG GLUCOSE: 108 MG/DL (ref 68–131)
GLUCOSE SERPL-MCNC: 98 MG/DL (ref 70–110)
HBA1C MFR BLD: 5.4 % (ref 4–5.6)
HCT VFR BLD AUTO: 40.1 % (ref 37–48.5)
HDLC SERPL-MCNC: 46 MG/DL (ref 40–75)
HDLC SERPL: 31.5 % (ref 20–50)
HGB BLD-MCNC: 12.5 G/DL (ref 12–16)
IMM GRANULOCYTES # BLD AUTO: 0.01 K/UL (ref 0–0.04)
IMM GRANULOCYTES NFR BLD AUTO: 0.1 % (ref 0–0.5)
LDLC SERPL CALC-MCNC: 75.4 MG/DL (ref 63–159)
LYMPHOCYTES # BLD AUTO: 2.6 K/UL (ref 1–4.8)
LYMPHOCYTES NFR BLD: 35.9 % (ref 18–48)
MCH RBC QN AUTO: 30 PG (ref 27–31)
MCHC RBC AUTO-ENTMCNC: 31.2 G/DL (ref 32–36)
MCV RBC AUTO: 96 FL (ref 82–98)
MONOCYTES # BLD AUTO: 0.6 K/UL (ref 0.3–1)
MONOCYTES NFR BLD: 8 % (ref 4–15)
NEUTROPHILS # BLD AUTO: 3.9 K/UL (ref 1.8–7.7)
NEUTROPHILS NFR BLD: 53.4 % (ref 38–73)
NONHDLC SERPL-MCNC: 100 MG/DL
NRBC BLD-RTO: 0 /100 WBC
PLATELET # BLD AUTO: 239 K/UL (ref 150–450)
PMV BLD AUTO: 10.5 FL (ref 9.2–12.9)
POTASSIUM SERPL-SCNC: 3.6 MMOL/L (ref 3.5–5.1)
PROT SERPL-MCNC: 7.1 G/DL (ref 6–8.4)
RBC # BLD AUTO: 4.17 M/UL (ref 4–5.4)
SODIUM SERPL-SCNC: 142 MMOL/L (ref 136–145)
TRIGL SERPL-MCNC: 123 MG/DL (ref 30–150)
VIT B12 SERPL-MCNC: 498 PG/ML (ref 210–950)
WBC # BLD AUTO: 7.29 K/UL (ref 3.9–12.7)

## 2023-04-20 PROCEDURE — 3078F DIAST BP <80 MM HG: CPT | Mod: HCNC,CPTII,S$GLB,

## 2023-04-20 PROCEDURE — 3288F FALL RISK ASSESSMENT DOCD: CPT | Mod: HCNC,CPTII,S$GLB,

## 2023-04-20 PROCEDURE — 3008F PR BODY MASS INDEX (BMI) DOCUMENTED: ICD-10-PCS | Mod: HCNC,CPTII,S$GLB,

## 2023-04-20 PROCEDURE — 99215 OFFICE O/P EST HI 40 MIN: CPT | Mod: HCNC,S$GLB,,

## 2023-04-20 PROCEDURE — 1160F PR REVIEW ALL MEDS BY PRESCRIBER/CLIN PHARMACIST DOCUMENTED: ICD-10-PCS | Mod: HCNC,CPTII,S$GLB,

## 2023-04-20 PROCEDURE — 99499 RISK ADDL DX/OHS AUDIT: ICD-10-PCS | Mod: HCNC,S$GLB,,

## 2023-04-20 PROCEDURE — 80061 LIPID PANEL: CPT | Mod: HCNC

## 2023-04-20 PROCEDURE — 3074F SYST BP LT 130 MM HG: CPT | Mod: HCNC,CPTII,S$GLB,

## 2023-04-20 PROCEDURE — 1101F PT FALLS ASSESS-DOCD LE1/YR: CPT | Mod: HCNC,CPTII,S$GLB,

## 2023-04-20 PROCEDURE — 99999 PR PBB SHADOW E&M-EST. PATIENT-LVL V: ICD-10-PCS | Mod: PBBFAC,HCNC,,

## 2023-04-20 PROCEDURE — 1101F PR PT FALLS ASSESS DOC 0-1 FALLS W/OUT INJ PAST YR: ICD-10-PCS | Mod: HCNC,CPTII,S$GLB,

## 2023-04-20 PROCEDURE — 85025 COMPLETE CBC W/AUTO DIFF WBC: CPT | Mod: HCNC

## 2023-04-20 PROCEDURE — 1126F AMNT PAIN NOTED NONE PRSNT: CPT | Mod: HCNC,CPTII,S$GLB,

## 2023-04-20 PROCEDURE — 83036 HEMOGLOBIN GLYCOSYLATED A1C: CPT | Mod: HCNC

## 2023-04-20 PROCEDURE — 1159F PR MEDICATION LIST DOCUMENTED IN MEDICAL RECORD: ICD-10-PCS | Mod: HCNC,CPTII,S$GLB,

## 2023-04-20 PROCEDURE — 36415 COLL VENOUS BLD VENIPUNCTURE: CPT | Mod: HCNC

## 2023-04-20 PROCEDURE — 1159F MED LIST DOCD IN RCRD: CPT | Mod: HCNC,CPTII,S$GLB,

## 2023-04-20 PROCEDURE — 3008F BODY MASS INDEX DOCD: CPT | Mod: HCNC,CPTII,S$GLB,

## 2023-04-20 PROCEDURE — 1160F RVW MEDS BY RX/DR IN RCRD: CPT | Mod: HCNC,CPTII,S$GLB,

## 2023-04-20 PROCEDURE — 80053 COMPREHEN METABOLIC PANEL: CPT | Mod: HCNC

## 2023-04-20 PROCEDURE — 3078F PR MOST RECENT DIASTOLIC BLOOD PRESSURE < 80 MM HG: ICD-10-PCS | Mod: HCNC,CPTII,S$GLB,

## 2023-04-20 PROCEDURE — 84443 ASSAY THYROID STIM HORMONE: CPT | Mod: HCNC

## 2023-04-20 PROCEDURE — 3074F PR MOST RECENT SYSTOLIC BLOOD PRESSURE < 130 MM HG: ICD-10-PCS | Mod: HCNC,CPTII,S$GLB,

## 2023-04-20 PROCEDURE — 99999 PR PBB SHADOW E&M-EST. PATIENT-LVL V: CPT | Mod: PBBFAC,HCNC,,

## 2023-04-20 PROCEDURE — 82607 VITAMIN B-12: CPT | Mod: HCNC

## 2023-04-20 PROCEDURE — 3288F PR FALLS RISK ASSESSMENT DOCUMENTED: ICD-10-PCS | Mod: HCNC,CPTII,S$GLB,

## 2023-04-20 PROCEDURE — 99215 PR OFFICE/OUTPT VISIT, EST, LEVL V, 40-54 MIN: ICD-10-PCS | Mod: HCNC,S$GLB,,

## 2023-04-20 PROCEDURE — 99499 UNLISTED E&M SERVICE: CPT | Mod: HCNC,S$GLB,,

## 2023-04-20 PROCEDURE — 1126F PR PAIN SEVERITY QUANTIFIED, NO PAIN PRESENT: ICD-10-PCS | Mod: HCNC,CPTII,S$GLB,

## 2023-04-20 RX ORDER — DONEPEZIL HYDROCHLORIDE 10 MG/1
10 TABLET, FILM COATED ORAL NIGHTLY
Qty: 90 TABLET | Refills: 3 | Status: SHIPPED | OUTPATIENT
Start: 2023-04-20 | End: 2023-05-19 | Stop reason: SDUPTHER

## 2023-04-20 RX ORDER — SERTRALINE HYDROCHLORIDE 25 MG/1
25 TABLET, FILM COATED ORAL DAILY
Qty: 90 TABLET | Refills: 1 | Status: SHIPPED | OUTPATIENT
Start: 2023-04-20 | End: 2023-04-20
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
## 2023-04-21 ENCOUNTER — TELEPHONE (OUTPATIENT)
Dept: FAMILY MEDICINE | Facility: CLINIC | Age: 73
End: 2023-04-21
Payer: MEDICARE

## 2023-04-21 LAB — TSH SERPL DL<=0.005 MIU/L-ACNC: 1.49 UIU/ML (ref 0.4–4)

## 2023-04-21 NOTE — TELEPHONE ENCOUNTER
----- Message from Hanna Mcbride MA sent at 4/21/2023 10:47 AM CDT -----  Regarding: appointment  Patient had a scheduled appointment for 5/19/23 at 10:20 with Dr. Arias. Patient had seen Nancy Bauman NP on 4/20, at visit  advised Nancy that he did not want to travel that far for appointment since they are now living here. Appointment was cancelled, however now  is requesting that appointment to be scheduled back. I think there is a miscommunication. Is there anyway patient can be rescheduled. Thank you.

## 2023-04-26 ENCOUNTER — PATIENT MESSAGE (OUTPATIENT)
Dept: INTERNAL MEDICINE | Facility: CLINIC | Age: 73
End: 2023-04-26

## 2023-04-26 ENCOUNTER — OFFICE VISIT (OUTPATIENT)
Dept: INTERNAL MEDICINE | Facility: CLINIC | Age: 73
End: 2023-04-26
Payer: MEDICARE

## 2023-04-26 VITALS
BODY MASS INDEX: 30.28 KG/M2 | DIASTOLIC BLOOD PRESSURE: 66 MMHG | OXYGEN SATURATION: 98 % | HEART RATE: 63 BPM | HEIGHT: 63 IN | WEIGHT: 170.88 LBS | SYSTOLIC BLOOD PRESSURE: 122 MMHG | TEMPERATURE: 97 F

## 2023-04-26 DIAGNOSIS — N18.31 CHRONIC KIDNEY DISEASE, STAGE 3A: ICD-10-CM

## 2023-04-26 DIAGNOSIS — I10 ESSENTIAL HYPERTENSION: ICD-10-CM

## 2023-04-26 DIAGNOSIS — Z86.73 HISTORY OF LACUNAR CEREBROVASCULAR ACCIDENT (CVA): ICD-10-CM

## 2023-04-26 DIAGNOSIS — Z00.00 ENCOUNTER FOR MEDICAL EXAMINATION TO ESTABLISH CARE: Primary | ICD-10-CM

## 2023-04-26 DIAGNOSIS — R41.841 COGNITIVE COMMUNICATION DISORDER: ICD-10-CM

## 2023-04-26 PROCEDURE — 1101F PR PT FALLS ASSESS DOC 0-1 FALLS W/OUT INJ PAST YR: ICD-10-PCS | Mod: HCNC,CPTII,S$GLB, | Performed by: FAMILY MEDICINE

## 2023-04-26 PROCEDURE — 1126F PR PAIN SEVERITY QUANTIFIED, NO PAIN PRESENT: ICD-10-PCS | Mod: HCNC,CPTII,S$GLB, | Performed by: FAMILY MEDICINE

## 2023-04-26 PROCEDURE — 3288F FALL RISK ASSESSMENT DOCD: CPT | Mod: HCNC,CPTII,S$GLB, | Performed by: FAMILY MEDICINE

## 2023-04-26 PROCEDURE — 3078F PR MOST RECENT DIASTOLIC BLOOD PRESSURE < 80 MM HG: ICD-10-PCS | Mod: HCNC,CPTII,S$GLB, | Performed by: FAMILY MEDICINE

## 2023-04-26 PROCEDURE — 3074F PR MOST RECENT SYSTOLIC BLOOD PRESSURE < 130 MM HG: ICD-10-PCS | Mod: HCNC,CPTII,S$GLB, | Performed by: FAMILY MEDICINE

## 2023-04-26 PROCEDURE — 99999 PR PBB SHADOW E&M-EST. PATIENT-LVL III: CPT | Mod: PBBFAC,HCNC,, | Performed by: FAMILY MEDICINE

## 2023-04-26 PROCEDURE — 3074F SYST BP LT 130 MM HG: CPT | Mod: HCNC,CPTII,S$GLB, | Performed by: FAMILY MEDICINE

## 2023-04-26 PROCEDURE — 3008F PR BODY MASS INDEX (BMI) DOCUMENTED: ICD-10-PCS | Mod: HCNC,CPTII,S$GLB, | Performed by: FAMILY MEDICINE

## 2023-04-26 PROCEDURE — 3044F HG A1C LEVEL LT 7.0%: CPT | Mod: HCNC,CPTII,S$GLB, | Performed by: FAMILY MEDICINE

## 2023-04-26 PROCEDURE — 3288F PR FALLS RISK ASSESSMENT DOCUMENTED: ICD-10-PCS | Mod: HCNC,CPTII,S$GLB, | Performed by: FAMILY MEDICINE

## 2023-04-26 PROCEDURE — 99214 OFFICE O/P EST MOD 30 MIN: CPT | Mod: HCNC,S$GLB,, | Performed by: FAMILY MEDICINE

## 2023-04-26 PROCEDURE — 3008F BODY MASS INDEX DOCD: CPT | Mod: HCNC,CPTII,S$GLB, | Performed by: FAMILY MEDICINE

## 2023-04-26 PROCEDURE — 1159F MED LIST DOCD IN RCRD: CPT | Mod: HCNC,CPTII,S$GLB, | Performed by: FAMILY MEDICINE

## 2023-04-26 PROCEDURE — 1160F PR REVIEW ALL MEDS BY PRESCRIBER/CLIN PHARMACIST DOCUMENTED: ICD-10-PCS | Mod: HCNC,CPTII,S$GLB, | Performed by: FAMILY MEDICINE

## 2023-04-26 PROCEDURE — 1159F PR MEDICATION LIST DOCUMENTED IN MEDICAL RECORD: ICD-10-PCS | Mod: HCNC,CPTII,S$GLB, | Performed by: FAMILY MEDICINE

## 2023-04-26 PROCEDURE — 99999 PR PBB SHADOW E&M-EST. PATIENT-LVL III: ICD-10-PCS | Mod: PBBFAC,HCNC,, | Performed by: FAMILY MEDICINE

## 2023-04-26 PROCEDURE — 3044F PR MOST RECENT HEMOGLOBIN A1C LEVEL <7.0%: ICD-10-PCS | Mod: HCNC,CPTII,S$GLB, | Performed by: FAMILY MEDICINE

## 2023-04-26 PROCEDURE — 1126F AMNT PAIN NOTED NONE PRSNT: CPT | Mod: HCNC,CPTII,S$GLB, | Performed by: FAMILY MEDICINE

## 2023-04-26 PROCEDURE — 1101F PT FALLS ASSESS-DOCD LE1/YR: CPT | Mod: HCNC,CPTII,S$GLB, | Performed by: FAMILY MEDICINE

## 2023-04-26 PROCEDURE — 99214 PR OFFICE/OUTPT VISIT, EST, LEVL IV, 30-39 MIN: ICD-10-PCS | Mod: HCNC,S$GLB,, | Performed by: FAMILY MEDICINE

## 2023-04-26 PROCEDURE — 1160F RVW MEDS BY RX/DR IN RCRD: CPT | Mod: HCNC,CPTII,S$GLB, | Performed by: FAMILY MEDICINE

## 2023-04-26 PROCEDURE — 3078F DIAST BP <80 MM HG: CPT | Mod: HCNC,CPTII,S$GLB, | Performed by: FAMILY MEDICINE

## 2023-04-26 RX ORDER — NAPROXEN SODIUM 220 MG/1
81 TABLET, FILM COATED ORAL DAILY
Qty: 90 TABLET | Refills: 3 | Status: SHIPPED | OUTPATIENT
Start: 2023-04-26

## 2023-04-26 NOTE — PROGRESS NOTES
Rosa Isela GOMEZ [place]  04/26/2023  [id number]    Clarissa Arias MD  Patient Care Team:  Clarissa Arias MD as PCP - General (Family Medicine)  Anahi Diana LPN as Care Coordinator          Visit Type: New patient, establish care    Chief Complaint:  Chief Complaint   Patient presents with    Establish Care       History of Present Illness:  72 year old  New to me.    She has history of HTN, HLD, Lacunar Stroke with right side weakness and congitive effects, CKD3.    She is on Avalide for BP control, Metoprolol, norvasc    She is on Lipitor 80, ASA post Stroke.    For her memory issues, she was taking Aricept.  Saw Neurology in 2021  She has some behavior issues with agitation.   Neuro said Patient has symptoms of bvFTD. Plan to start aricept and may need to use antipsychotics if needed.    Previously seeing Psy for Major depressive disorder  Previously on lexapro, Buspar, Vistaril, trazodone  Per previous PCP notes, pt stopped medication herself  Intimally felt that it helped with her mood  In 2021she called EMS on her family members because they would not let her drive  She has been sent to ER previously because she was a threat to herself- felt that behavior was due to polypharmacy.  Notes suggest that better when off most of the medications.  Did see Neurology in [place]    LAbs done, all okay  Mild CKD with lower eGFR  BP in goal range.    Last visit she was referred to Neuro and Psych  Next avail appt are in October      History:  Past Medical History:   Diagnosis Date    Chronic kidney disease, stage 3a 4/20/2023    Depression     Hyperlipidemia     Hypertension     Obesity     Tobacco dependence      Past Surgical History:   Procedure Laterality Date    CHOLECYSTECTOMY      removal    COLONOSCOPY N/A 11/1/2017    Procedure: COLONOSCOPY;  Surgeon: Francisco Javier Bai MD;  Location: [facility];  Service: Endoscopy;  Laterality: N/A;    HYSTERECTOMY      MYOMECTOMY      removal    OOPHORECTOMY      TUBAL  LIGATION       Family History   Problem Relation Age of Onset    Stroke Mother     Hypertension Mother     Hypertension Father     Heart disease Father     Cancer Sister     Diabetes Neg Hx      Social History     Socioeconomic History    Marital status:    Tobacco Use    Smoking status: Every Day     Packs/day: 1.00     Years: 30.00     Pack years: 30.00     Types: Cigarettes    Smokeless tobacco: Never   Substance and Sexual Activity    Alcohol use: Not Currently     Comment: occ    Drug use: No    Sexual activity: Not Currently     Partners: Male     Social Determinants of Health     Financial Resource Strain: High Risk    Difficulty of Paying Living Expenses: Hard   Food Insecurity: Food Insecurity Present    Worried About Running Out of Food in the Last Year: Sometimes true    Ran Out of Food in the Last Year: Sometimes true   Transportation Needs: No Transportation Needs    Lack of Transportation (Medical): No    Lack of Transportation (Non-Medical): No   Physical Activity: Insufficiently Active    Days of Exercise per Week: 1 day    Minutes of Exercise per Session: 10 min   Stress: Stress Concern Present    Feeling of Stress : Rather much   Social Connections: Moderately Isolated    Frequency of Communication with Friends and Family: Three times a week    Frequency of Social Gatherings with Friends and Family: Twice a week    Attends [employer] Services: Never    Active Member of Clubs or Organizations: No    Attends Club or Organization Meetings: Never    Marital Status:    Housing Stability: Low Risk     Unable to Pay for Housing in the Last Year: No    Number of Places Lived in the Last Year: 2    Unstable Housing in the Last Year: No     Patient Active Problem List   Diagnosis    Essential hypertension    Hyperlipidemia    Vasovagal syncope    Tobacco use disorder    Class 1 obesity due to excess calories in adult    Right sided weakness    Deficit in activities of daily living (ADL)     Cognitive communication disorder    Decreased activity tolerance    Decreased strength of lower extremity    Lacunar infarction    Major depressive disorder with single episode, in full remission    Chronic kidney disease, stage 3a     Review of patient's allergies indicates:   Allergen Reactions    Enalapril Other (See Comments)     Ace cough       The following were reviewed at this visit: active problem list, medication list, allergies, family history, social history, and health maintenance.    Medications:  Current Outpatient Medications on File Prior to Visit   Medication Sig Dispense Refill    amLODIPine (NORVASC) 5 MG tablet TAKE 1 TABLET BY MOUTH ONCE DAILY APPOINTMENT NEEDED  WITH DR. CORREA FOR  FUTURE REFILLS 90 tablet 3    atorvastatin (LIPITOR) 80 MG tablet Take 1 tablet (80 mg total) by mouth nightly. 90 tablet 3    irbesartan-hydrochlorothiazide (AVALIDE) 150-12.5 mg per tablet Take 1 tablet by mouth once daily. 90 tablet 3    metoprolol succinate (TOPROL-XL) 25 MG 24 hr tablet TAKE 1 TABLET BY MOUTH  DAILY 90 tablet 3    multivitamin with minerals tablet       aspirin 81 MG Chew Take 1 tablet (81 mg total) by mouth once daily. (Patient not taking: Reported on 4/20/2023) 90 tablet 3    donepeziL (ARICEPT) 10 MG tablet Take 1 tablet (10 mg total) by mouth every evening. Take one half tablet for one week then as prescribed. (Patient not taking: Reported on 4/26/2023) 90 tablet 3    [DISCONTINUED] busPIRone (BUSPAR) 5 MG Tab Take 1 tablet (5 mg total) by mouth 2 (two) times daily. 60 tablet 5    [DISCONTINUED] ciclopirox (PENLAC) 8 % Soln Apply topically nightly. (Patient not taking: Reported on 4/26/2023) 1 Bottle 3    [DISCONTINUED] simvastatin (ZOCOR) 20 MG tablet Take 1 tablet (20 mg total) by mouth every evening. (Patient not taking: Reported on 4/20/2023) 90 tablet 3     No current facility-administered medications on file prior to visit.       Medications have been reviewed and reconciled with  patient at this visit.  Barriers to medications reviewed with patient.    Adverse reactions to current medications reviewed with patient..    Over the counter medications reviewed and reconciled with patient.    Exam:  Wt Readings from Last 3 Encounters:   04/26/23 77.5 kg (170 lb 13.7 oz)   04/20/23 77.5 kg (170 lb 13.7 oz)   04/18/23 77 kg (169 lb 12.1 oz)     Temp Readings from Last 3 Encounters:   04/26/23 96.7 °F (35.9 °C) (Tympanic)   04/20/23 98.4 °F (36.9 °C) (Tympanic)   10/27/22 98.7 °F (37.1 °C) (Oral)     BP Readings from Last 3 Encounters:   04/26/23 122/66   04/20/23 124/60   04/18/23 120/72     Pulse Readings from Last 3 Encounters:   04/26/23 63   04/20/23 (!) 56   04/18/23 (!) 56     Body mass index is 30.08 kg/m².      Review of Systems   Psychiatric/Behavioral:  Positive for memory loss.         Agitation     Physical Exam  Nursing note reviewed.   Pulmonary:      Effort: Pulmonary effort is normal. No respiratory distress.   Neurological:      Mental Status: She is alert and oriented to person, place, and time.   Psychiatric:         Behavior: Behavior is agitated.         Cognition and Memory: Memory is impaired.       Laboratory Reviewed ({Yes)  Lab Results   Component Value Date    WBC 7.29 04/20/2023    HGB 12.5 04/20/2023    HCT 40.1 04/20/2023     04/20/2023    CHOL 146 04/20/2023    TRIG 123 04/20/2023    HDL 46 04/20/2023    ALT 23 04/20/2023    AST 17 04/20/2023     04/20/2023    K 3.6 04/20/2023     04/20/2023    CREATININE 1.0 04/20/2023    BUN 19 04/20/2023    CO2 26 04/20/2023    TSH 1.486 04/20/2023    INR 1.0 10/27/2022    HGBA1C 5.4 04/20/2023       Rosa Isela was seen today for establish care.    Diagnoses and all orders for this visit:    Encounter for medical examination to establish care    History of lacunar cerebrovascular accident (CVA)  -     Ambulatory referral/consult to Outpatient Case Management  On lipitor 80  ASA  BP in goal range  Essential  hypertension  Monitored   Bp in goal range  Recent labs  Cognitive communication disorder  -     Ambulatory referral/consult to Outpatient Case Management  Aricept  Neuro and Psych were both consulted and schedule.     Chronic kidney disease, stage 3a  -     Ambulatory referral/consult to Outpatient Case Management  CMp is reviewed  Mild        Labs done  Referral placed, Neuro and Psych next avail October    Restarted Aricept.  Psych had mentioned atypical antipsychotic as next steps.  Patient deferred meds    LAbs done  eGFr ,60  Monitor  Otherwise fine    MMG scheduled  Colon scheduled    No change in home meds            Care Plan/Goals: Reviewed    Goals    None         Follow up: Follow up in about 6 months (around 10/26/2023) for Follow up SHERRY.    After visit summary was printed and given to patient upon discharge today.  Patient goals and care plan are included in After Visit Summary.

## 2023-04-27 ENCOUNTER — PATIENT MESSAGE (OUTPATIENT)
Dept: INTERNAL MEDICINE | Facility: CLINIC | Age: 73
End: 2023-04-27
Payer: MEDICARE

## 2023-04-27 ENCOUNTER — HOSPITAL ENCOUNTER (OUTPATIENT)
Dept: PREADMISSION TESTING | Facility: HOSPITAL | Age: 73
Discharge: HOME | End: 2023-04-27
Attending: INTERNAL MEDICINE
Payer: MEDICARE

## 2023-04-27 DIAGNOSIS — Z86.010 PERSONAL HISTORY OF COLONIC POLYPS: Primary | ICD-10-CM

## 2023-04-27 RX ORDER — POLYETHYLENE GLYCOL 3350, SODIUM SULFATE ANHYDROUS, SODIUM BICARBONATE, SODIUM CHLORIDE, POTASSIUM CHLORIDE 236; 22.74; 6.74; 5.86; 2.97 G/4L; G/4L; G/4L; G/4L; G/4L
4 POWDER, FOR SOLUTION ORAL ONCE
Qty: 4000 ML | Refills: 0 | Status: SHIPPED | OUTPATIENT
Start: 2023-04-27 | End: 2023-04-27

## 2023-04-27 RX ORDER — ATORVASTATIN CALCIUM 80 MG/1
80 TABLET, FILM COATED ORAL NIGHTLY
Qty: 90 TABLET | Refills: 3 | Status: SHIPPED | OUTPATIENT
Start: 2023-04-27 | End: 2024-03-25 | Stop reason: SDUPTHER

## 2023-04-27 NOTE — TELEPHONE ENCOUNTER
No care due was identified.  [facility] [place] Embedded Care Due Messages. Reference number: [id number].   4/27/2023 3:54:43 PM CDT

## 2023-04-28 ENCOUNTER — PATIENT MESSAGE (OUTPATIENT)
Dept: INTERNAL MEDICINE | Facility: CLINIC | Age: 73
End: 2023-04-28
Payer: MEDICARE

## 2023-04-28 NOTE — TELEPHONE ENCOUNTER
Will patient be ok without her atorvastatin for seven to ten days waiting for it to come in the mail?

## 2023-05-02 ENCOUNTER — OUTPATIENT CASE MANAGEMENT (OUTPATIENT)
Dept: ADMINISTRATIVE | Facility: OTHER | Age: 73
End: 2023-05-02
Payer: MEDICARE

## 2023-05-02 NOTE — PROGRESS NOTES
Outpatient Care Management   - Low Risk Patient Assessment    Patient: Rosa Isela Pate  MRN:  [id number]  Date of Service:  5/2/2023  Completed by:  Berenice Kinsey LCSW  Referral Date: 04/26/2023    Reason for Visit   Patient presents with    OPCM Chart Review    Social Work Assessment - Low/Mod Risk     5/2/2023    Case Closure     Brief Summary: Sw received referral for patient from , Pt's PCP. Sw completed assessment with Pt via telephone. Pt reports living alone (however Pt's spouse is currently staying at her place) and reports being independent with ADLs. Pt does not use assistive devices to ambulate. Pt denies difficulty affording food, housing, medications, or medical expenses. Pt drives short distances and reports her spouse or sister help with transportation to medical appointments.  Pt denies having any needs to be addressed by OPCM Sw at this time. Pt advised to communicate with PCP if needs arise in future and she voiced understanding.

## 2023-05-10 ENCOUNTER — PATIENT MESSAGE (OUTPATIENT)
Dept: INTERNAL MEDICINE | Facility: CLINIC | Age: 73
End: 2023-05-10
Payer: MEDICARE

## 2023-05-10 ENCOUNTER — CLINICAL SUPPORT (OUTPATIENT)
Dept: SMOKING CESSATION | Facility: CLINIC | Age: 73
End: 2023-05-10
Payer: COMMERCIAL

## 2023-05-10 DIAGNOSIS — F17.200 NICOTINE DEPENDENCE: Primary | ICD-10-CM

## 2023-05-10 PROCEDURE — 99404 PR PREVENT COUNSEL,INDIV,60 MIN: ICD-10-PCS | Mod: S$GLB,,, | Performed by: GENERAL PRACTICE

## 2023-05-10 PROCEDURE — 99999 PR PBB SHADOW E&M-EST. PATIENT-LVL II: ICD-10-PCS | Mod: PBBFAC,,,

## 2023-05-10 PROCEDURE — 99999 PR PBB SHADOW E&M-EST. PATIENT-LVL II: CPT | Mod: PBBFAC,,,

## 2023-05-10 PROCEDURE — 99404 PREV MED CNSL INDIV APPRX 60: CPT | Mod: S$GLB,,, | Performed by: GENERAL PRACTICE

## 2023-05-10 RX ORDER — IBUPROFEN 200 MG
1 TABLET ORAL DAILY
Qty: 28 PATCH | Refills: 0 | Status: SHIPPED | OUTPATIENT
Start: 2023-05-10 | End: 2023-12-28

## 2023-05-10 NOTE — Clinical Note
Patient intake for Quit 1 Episode.  Patient will be participating in bi-weekly tobacco cessation meetings and will begin the prescribed tobacco cessation medication regimen of 21 mg nicotine patch QD. FTND score of 3 indicates a moderate dependence on nicotine. FRANCHESCA-D score of 30 is perceived as significant distress / probable depression at this time. Pt sees PCP to manage. Will monitor.

## 2023-05-12 DIAGNOSIS — R41.3 MEMORY LOSS: ICD-10-CM

## 2023-05-12 DIAGNOSIS — F32.2 MAJOR DEPRESSIVE DISORDER, SINGLE EPISODE, SEVERE WITHOUT PSYCHOTIC FEATURES: ICD-10-CM

## 2023-05-12 DIAGNOSIS — I10 ESSENTIAL HYPERTENSION: ICD-10-CM

## 2023-05-12 DIAGNOSIS — R41.841 COGNITIVE COMMUNICATION DISORDER: ICD-10-CM

## 2023-05-12 NOTE — TELEPHONE ENCOUNTER
No care due was identified.  [facility] Embedded Care Due Messages. Reference number: [id number].   5/12/2023 5:01:23 PM CDT

## 2023-05-13 RX ORDER — IRBESARTAN 300 MG/1
TABLET ORAL
Refills: 0 | OUTPATIENT
Start: 2023-05-13

## 2023-05-13 RX ORDER — BUSPIRONE HYDROCHLORIDE 5 MG/1
TABLET ORAL
Refills: 0 | OUTPATIENT
Start: 2023-05-13

## 2023-05-13 RX ORDER — SIMVASTATIN 20 MG/1
TABLET, FILM COATED ORAL
Refills: 0 | OUTPATIENT
Start: 2023-05-13

## 2023-05-13 RX ORDER — DONEPEZIL HYDROCHLORIDE 10 MG/1
TABLET, FILM COATED ORAL
Refills: 0 | OUTPATIENT
Start: 2023-05-13

## 2023-05-13 RX ORDER — NAPROXEN SODIUM 220 MG/1
TABLET, FILM COATED ORAL
Refills: 0 | OUTPATIENT
Start: 2023-05-13

## 2023-05-13 RX ORDER — AMLODIPINE BESYLATE 5 MG/1
TABLET ORAL
Refills: 0 | OUTPATIENT
Start: 2023-05-13

## 2023-05-13 RX ORDER — METOPROLOL SUCCINATE 25 MG/1
TABLET, EXTENDED RELEASE ORAL
Refills: 0 | OUTPATIENT
Start: 2023-05-13

## 2023-05-13 NOTE — TELEPHONE ENCOUNTER
Refill Decision Note   Rosa Isela Pate  is requesting a refill authorization.  Brief Assessment and Rationale for Refill:  Quick Discontinue     Medication Therapy Plan:      Pharmacy is requesting new scripts for the following medications without required information, (sig/ frequency/qty/etc)      Medication Reconciliation Completed: No     Comments: Pharmacies have been requesting medications for patients without required information, (sig, frequency, qty, etc.). In addition, requests are sent for medication(s) pt. are currently not taking, and medications patients have never taken.    We have spoken to the pharmacies about these request types and advised their teams previously that we are unable to assess these New Script requests and require all details for these requests. This is a known issue and has been reported.     Note composed:3:51 AM 05/13/2023

## 2023-05-19 DIAGNOSIS — R41.3 MEMORY LOSS: ICD-10-CM

## 2023-05-19 DIAGNOSIS — R41.841 COGNITIVE COMMUNICATION DISORDER: ICD-10-CM

## 2023-05-19 RX ORDER — BUSPIRONE HYDROCHLORIDE 5 MG/1
TABLET ORAL
COMMUNITY
Start: 2023-05-01 | End: 2023-10-26 | Stop reason: SDUPTHER

## 2023-05-19 RX ORDER — DONEPEZIL HYDROCHLORIDE 10 MG/1
10 TABLET, FILM COATED ORAL NIGHTLY
Qty: 90 TABLET | Refills: 3 | Status: SHIPPED | OUTPATIENT
Start: 2023-05-19 | End: 2023-07-12 | Stop reason: SDUPTHER

## 2023-05-22 ENCOUNTER — PATIENT MESSAGE (OUTPATIENT)
Dept: INTERNAL MEDICINE | Facility: CLINIC | Age: 73
End: 2023-05-22
Payer: MEDICARE

## 2023-05-23 ENCOUNTER — TELEPHONE (OUTPATIENT)
Dept: PREADMISSION TESTING | Facility: HOSPITAL | Age: 73
End: 2023-05-23
Payer: MEDICARE

## 2023-05-23 DIAGNOSIS — Z86.010 PERSONAL HISTORY OF COLONIC POLYPS: Primary | ICD-10-CM

## 2023-05-23 DIAGNOSIS — Z12.11 COLON CANCER SCREENING: ICD-10-CM

## 2023-05-23 NOTE — TELEPHONE ENCOUNTER
----- Message from Verónica Angelo sent at 5/23/2023  3:42 PM CDT -----  Contact: Rosa Isela  Type:  Patient Returning Call    Who Called:Rosa Isela  Who Left Message for Patient:unknown  Does the patient know what this is regarding?:unknown  Would the patient rather a call back or a response via [facility]? call  Best Call Back Number:[phone]    Additional Information: Patient reports missing a call and request another call back.  Thank you,  GH

## 2023-05-25 ENCOUNTER — PATIENT MESSAGE (OUTPATIENT)
Dept: INTERNAL MEDICINE | Facility: CLINIC | Age: 73
End: 2023-05-25
Payer: MEDICARE

## 2023-05-25 DIAGNOSIS — R41.841 COGNITIVE COMMUNICATION DISORDER: Primary | ICD-10-CM

## 2023-06-01 ENCOUNTER — PATIENT MESSAGE (OUTPATIENT)
Dept: ADMINISTRATIVE | Facility: OTHER | Age: 73
End: 2023-06-01
Payer: MEDICARE

## 2023-06-04 ENCOUNTER — PATIENT MESSAGE (OUTPATIENT)
Dept: INTERNAL MEDICINE | Facility: CLINIC | Age: 73
End: 2023-06-04
Payer: MEDICARE

## 2023-06-06 ENCOUNTER — OUTPATIENT CASE MANAGEMENT (OUTPATIENT)
Dept: ADMINISTRATIVE | Facility: OTHER | Age: 73
End: 2023-06-06
Payer: MEDICARE

## 2023-06-08 ENCOUNTER — TELEPHONE (OUTPATIENT)
Dept: SMOKING CESSATION | Facility: CLINIC | Age: 73
End: 2023-06-08
Payer: MEDICARE

## 2023-06-15 ENCOUNTER — OUTPATIENT CASE MANAGEMENT (OUTPATIENT)
Dept: ADMINISTRATIVE | Facility: OTHER | Age: 73
End: 2023-06-15
Payer: MEDICARE

## 2023-06-15 ENCOUNTER — PATIENT MESSAGE (OUTPATIENT)
Dept: INTERNAL MEDICINE | Facility: CLINIC | Age: 73
End: 2023-06-15
Payer: MEDICARE

## 2023-06-15 NOTE — PROGRESS NOTES
Outpatient Care Management   - Low Risk Patient Assessment    Patient: Rosa Isela Pate  MRN:  [id number]  Date of Service:  6/15/2023  Completed by:  Berenice Kinsey LCSW  Referral Date: 05/25/2023    Reason for Visit   Patient presents with    Social Work Assessment - Low/Mod Risk     6/15/2023     Brief Summary: Sw received referral for patient from PCP. Sw received return phone call from Pt's spouse regarding referral submitted in May by Pt's PCP. Sw completed assessment with Pt's spouse (Pt provided permission) via telephone. Pt's spouse reports Pt lives with him in an apartment. Spouse reports Pt is mostly independent with Adls/Iadls. Spouse reports being concerned about Pt's memory issues. Spouse reports Pt's behavior and memory have changed recently and Pt having more difficulty being independent. Spouse reports Pt getting lost to medical appointment and recently Pt getting lost driving to her son's home. Pt's spouse reports Pt staying up all night and falling asleep in the early morning. He reports having to remind Pt to take her medications. Sw recommended spouse taking over the driving for Pt and recommended him attending her appointments. Pt's spouse voiced understanding. Sw informed Pt's spouse that Sw will notify Pt's PCP about reports changes in Pt's behavior and request sooner appointment with Neurology. Pt's spouse voiced agreement with plan and stated no additional needs. He indicated being primarily concerned with Pt's recent behaviors and memory changes. In basket message sent to Pt's PCP notifying of need for sooner appointment with neurologist.

## 2023-06-16 ENCOUNTER — OUTPATIENT CASE MANAGEMENT (OUTPATIENT)
Dept: ADMINISTRATIVE | Facility: OTHER | Age: 73
End: 2023-06-16
Payer: MEDICARE

## 2023-06-16 ENCOUNTER — PATIENT MESSAGE (OUTPATIENT)
Dept: INTERNAL MEDICINE | Facility: CLINIC | Age: 73
End: 2023-06-16
Payer: MEDICARE

## 2023-06-16 NOTE — PROGRESS NOTES
Outpatient Care Management   - Care Plan Follow Up    Patient: Rosa Isela Pate  MRN:  [id number]  Date of Service:  6/16/2023  Completed by:  Berenice Kinsey LCSW  Referral Date: 05/25/2023    Reason for Visit   Patient presents with    OPCM SW Follow Up Call    Update Plan Of Care     Brief Summary: Sw phoned Pt's spouse for follow up regarding requested appointment with neurology. Pt's spouse confirmed he communicated with Pt's PCP regarding his concerns and appointment with neurologist. He stated he would be willing to travel to [facility] to obtain sooner appointment for Pt. Pt's spouse primary concerns are regarding Pt prognosis with cognitive functioning. Pt's spouse did not identify needs to be addressed by Sw at this time. Pt's spouse will work with care team on obtaining neurology visit in [facility]. Case closed.     Complex Care Plan     Care plan was discussed and completed today with input from patient and/or caregiver.    Patient Instructions     No follow-ups on file.

## 2023-06-19 NOTE — TELEPHONE ENCOUNTER
I tried to get her scheduled with past neurologist Haseeb Bell but nothing was coming up. Not even virtual. Not sure if he is still with [facility]

## 2023-06-19 NOTE — TELEPHONE ENCOUNTER
Patient was seeing Dr. Haseeb Bell. She needs a follow up on her Memory. Is there anyone that can assist with getting her scheduled for a visit.

## 2023-06-20 ENCOUNTER — TELEPHONE (OUTPATIENT)
Dept: NEUROLOGY | Facility: CLINIC | Age: 73
End: 2023-06-20
Payer: MEDICARE

## 2023-06-21 ENCOUNTER — PATIENT MESSAGE (OUTPATIENT)
Dept: ADMINISTRATIVE | Facility: OTHER | Age: 73
End: 2023-06-21
Payer: MEDICARE

## 2023-06-23 ENCOUNTER — PATIENT MESSAGE (OUTPATIENT)
Dept: OTHER | Facility: OTHER | Age: 73
End: 2023-06-23
Payer: MEDICARE

## 2023-06-23 ENCOUNTER — PATIENT MESSAGE (OUTPATIENT)
Dept: INTERNAL MEDICINE | Facility: CLINIC | Age: 73
End: 2023-06-23
Payer: MEDICARE

## 2023-06-26 ENCOUNTER — HOSPITAL ENCOUNTER (OUTPATIENT)
Facility: HOSPITAL | Age: 73
Discharge: HOME | End: 2023-06-26
Attending: INTERNAL MEDICINE | Admitting: INTERNAL MEDICINE
Payer: MEDICARE

## 2023-06-26 ENCOUNTER — ANESTHESIA (OUTPATIENT)
Dept: ENDOSCOPY | Facility: HOSPITAL | Age: 73
End: 2023-06-26
Payer: MEDICARE

## 2023-06-26 ENCOUNTER — ANESTHESIA EVENT (OUTPATIENT)
Dept: ENDOSCOPY | Facility: HOSPITAL | Age: 73
End: 2023-06-26
Payer: MEDICARE

## 2023-06-26 VITALS
HEIGHT: 63 IN | SYSTOLIC BLOOD PRESSURE: 147 MMHG | HEART RATE: 62 BPM | DIASTOLIC BLOOD PRESSURE: 59 MMHG | BODY MASS INDEX: 30.12 KG/M2 | RESPIRATION RATE: 18 BRPM | TEMPERATURE: 99 F | OXYGEN SATURATION: 99 % | WEIGHT: 170 LBS

## 2023-06-26 DIAGNOSIS — K63.5 COLON POLYPS: ICD-10-CM

## 2023-06-26 DIAGNOSIS — K63.5 POLYP OF COLON, UNSPECIFIED PART OF COLON, UNSPECIFIED TYPE: Primary | ICD-10-CM

## 2023-06-26 PROCEDURE — 37000009 HC ANESTHESIA EA ADD 15 MINS: Performed by: INTERNAL MEDICINE

## 2023-06-26 PROCEDURE — 37000008 HC ANESTHESIA 1ST 15 MINUTES: Performed by: INTERNAL MEDICINE

## 2023-06-26 PROCEDURE — 63600175 PHARM REV CODE 636 W HCPCS: Performed by: NURSE ANESTHETIST, CERTIFIED REGISTERED

## 2023-06-26 PROCEDURE — G0105 COLORECTAL SCRN; HI RISK IND: HCPCS | Mod: HCNC | Performed by: INTERNAL MEDICINE

## 2023-06-26 PROCEDURE — G0105 COLORECTAL SCRN; HI RISK IND: ICD-10-PCS | Mod: HCNC,,, | Performed by: INTERNAL MEDICINE

## 2023-06-26 PROCEDURE — 25000003 PHARM REV CODE 250: Performed by: NURSE ANESTHETIST, CERTIFIED REGISTERED

## 2023-06-26 PROCEDURE — G0105 COLORECTAL SCRN; HI RISK IND: HCPCS | Mod: HCNC,,, | Performed by: INTERNAL MEDICINE

## 2023-06-26 RX ORDER — LIDOCAINE HYDROCHLORIDE 10 MG/ML
INJECTION, SOLUTION EPIDURAL; INFILTRATION; INTRACAUDAL; PERINEURAL
Status: DISCONTINUED | OUTPATIENT
Start: 2023-06-26 | End: 2023-06-26

## 2023-06-26 RX ORDER — PROPOFOL 10 MG/ML
VIAL (ML) INTRAVENOUS
Status: DISCONTINUED | OUTPATIENT
Start: 2023-06-26 | End: 2023-06-26

## 2023-06-26 RX ADMIN — PROPOFOL 50 MG: 10 INJECTION, EMULSION INTRAVENOUS at 10:06

## 2023-06-26 RX ADMIN — SODIUM CHLORIDE, POTASSIUM CHLORIDE, SODIUM LACTATE AND CALCIUM CHLORIDE: 600; 310; 30; 20 INJECTION, SOLUTION INTRAVENOUS at 10:06

## 2023-06-26 RX ADMIN — LIDOCAINE HYDROCHLORIDE 50 MG: 10 SOLUTION INTRAVENOUS at 10:06

## 2023-06-26 RX ADMIN — PROPOFOL 100 MG: 10 INJECTION, EMULSION INTRAVENOUS at 10:06

## 2023-06-26 NOTE — H&P
PRE PROCEDURE H&P    Patient Name: Rosa Isela Pate  MRN: [id number]  : 1950  Date of Procedure:  2023  Referring Physician: Samantha Beard NP  Primary Physician: Sherrell Drummond MD  Procedure Physician: Vicki Krishna MD       Planned Procedure: Colonoscopy  Diagnosis: previous adenomatous polyp  Chief Complaint: Same as above    HPI: Patient is an 72 y.o. female is here for the above.     Last colonoscopy:   Family history: None   Anticoagulation: None     Past Medical History:   Past Medical History:   Diagnosis Date    Chronic kidney disease, stage 3a 2023    Depression     Hyperlipidemia     Hypertension     Obesity     Stroke     Tobacco dependence         Past Surgical History:  Past Surgical History:   Procedure Laterality Date    CHOLECYSTECTOMY      removal    COLONOSCOPY N/A 2017    Procedure: COLONOSCOPY;  Surgeon: Francisco Javier Bai MD;  Location: [facility];  Service: Endoscopy;  Laterality: N/A;    HYSTERECTOMY      MYOMECTOMY      removal    OOPHORECTOMY      TUBAL LIGATION          Home Medications:  Prior to Admission medications    Medication Sig Start Date End Date Taking? Authorizing Provider   amLODIPine (NORVASC) 5 MG tablet TAKE 1 TABLET BY MOUTH ONCE DAILY APPOINTMENT NEEDED  WITH DR. CORREA FOR  FUTURE REFILLS 3/1/23  Yes Clarissa Arias MD   aspirin 81 MG Chew Take 1 tablet (81 mg total) by mouth once daily. 23  Yes Sherrell Drummond MD   atorvastatin (LIPITOR) 80 MG tablet Take 1 tablet (80 mg total) by mouth nightly. 23  Yes Sherrell Drummond MD   busPIRone (BUSPAR) 5 MG Tab Take by mouth. 23  Yes Historical Provider   donepeziL (ARICEPT) 10 MG tablet Take 1 tablet (10 mg total) by mouth every evening. Take one half tablet for one week then as prescribed. 23 Yes Nancy Bauman NP   irbesartan-hydrochlorothiazide (AVALIDE) 150-12.5 mg per tablet Take 1 tablet by mouth once daily. 6/10/22  Yes Clarissa Arias MD    metoprolol succinate (TOPROL-XL) 25 MG 24 hr tablet TAKE 1 TABLET BY MOUTH  DAILY 6/10/22  Yes Clarissa Arias MD   multivitamin with minerals tablet  8/3/16  Yes Historical Provider   nicotine (NICODERM CQ) 21 mg/24 hr Place 1 patch onto the skin once daily. 5/10/23   Kevin Hamilton MD        Allergies:  Review of patient's allergies indicates:   Allergen Reactions    Enalapril Other (See Comments)     Ace cough        Social History:   Social History     Socioeconomic History    Marital status:    Tobacco Use    Smoking status: Every Day     Packs/day: 1.00     Years: 51.00     Pack years: 51.00     Types: Cigarettes    Smokeless tobacco: Never   Substance and Sexual Activity    Alcohol use: Not Currently     Comment: occ    Drug use: No    Sexual activity: Not Currently     Partners: Male     Social Determinants of Health     Financial Resource Strain: Medium Risk    Difficulty of Paying Living Expenses: Somewhat hard   Food Insecurity: Food Insecurity Present    Worried About Running Out of Food in the Last Year: Sometimes true    Ran Out of Food in the Last Year: Sometimes true   Transportation Needs: No Transportation Needs    Lack of Transportation (Medical): No    Lack of Transportation (Non-Medical): No   Physical Activity: Inactive    Days of Exercise per Week: 1 day    Minutes of Exercise per Session: 0 min   Stress: Stress Concern Present    Feeling of Stress : To some extent   Social Connections: Moderately Isolated    Frequency of Communication with Friends and Family: More than three times a week    Frequency of Social Gatherings with Friends and Family: Once a week    Attends Religion Services: Never    Active Member of Clubs or Organizations: No    Attends Club or Organization Meetings: Never    Marital Status:    Housing Stability: Low Risk     Unable to Pay for Housing in the Last Year: No    Number of Places Lived in the Last Year: 2    Unstable Housing in the Last Year:  "No       Family History:  Family History   Problem Relation Age of Onset    Stroke Mother     Hypertension Mother     Hypertension Father     Heart disease Father     Cancer Sister     Diabetes Neg Hx        ROS: No acute cardiac events, no acute respiratory complaints.     Physical Exam (all patients):    /72 (BP Location: Left arm, Patient Position: Lying)   Pulse 72   Temp 98.6 °F (37 °C) (Temporal)   Resp 18   Ht 5' 3" (1.6 m)   Wt 77.1 kg (170 lb)   LMP  (LMP Unknown)   SpO2 98%   Breastfeeding No   BMI 30.11 kg/m²   Lungs: Clear to auscultation bilaterally, respirations unlabored  Heart: Regular rate and rhythm, S1 and S2 normal, no obvious murmurs  Abdomen:         Soft, non-tender, bowel sounds normal, no masses, no organomegaly    Lab Results   Component Value Date    WBC 7.29 04/20/2023    MCV 96 04/20/2023    RDW 13.6 04/20/2023     04/20/2023    INR 1.0 10/27/2022    GLU 98 04/20/2023    HGBA1C 5.4 04/20/2023    BUN 19 04/20/2023     04/20/2023    K 3.6 04/20/2023     04/20/2023        SEDATION PLAN: per anesthesia      History reviewed, vital signs satisfactory, cardiopulmonary status satisfactory, sedation options, risks and plans have been discussed with the patient  All their questions were answered and the patient agrees to the sedation procedures as planned and the patient is deemed an appropriate candidate for the sedation as planned.    Procedure explained to patient, informed consent obtained and placed in chart.    Vicki Krishna  6/26/2023  10:26 AM     "

## 2023-06-26 NOTE — PROVATION PATIENT INSTRUCTIONS
Discharge Summary/Instructions after an Endoscopic Procedure  Patient Name: Rosa Isela Pate  Patient MRN: [id number]  Patient YOB: 1950 Monday, June 26, 2023 Vicki Krishna MD  Dear patient,  As a result of recent federal legislation (The Federal Cures Act), you may   receive lab or pathology results from your procedure in your [facility]   account before your physician is able to contact you. Your physician or   their representative will relay the results to you with their   recommendations at their soonest availability.  Thank you,  RESTRICTIONS:  During your procedure today, you received medications for sedation.  These   medications may affect your judgment, balance and coordination.  Therefore,   for 24 hours, you have the following restrictions:   - DO NOT drive a car, operate machinery, make legal/financial decisions,   sign important papers or drink alcohol.    ACTIVITY:  Today: no heavy lifting, straining or running due to procedural   sedation/anesthesia.  The following day: return to full activity including work.  DIET:  Eat and drink normally unless instructed otherwise.     TREATMENT FOR COMMON SIDE EFFECTS:  - Mild abdominal pain, nausea, belching, bloating or excessive gas:  rest,   eat lightly and use a heating pad.  - Sore Throat: treat with throat lozenges and/or gargle with warm salt   water.  - Because air was used during the procedure, expelling large amounts of air   from your rectum or belching is normal.  - If a bowel prep was taken, you may not have a bowel movement for 1-3 days.    This is normal.  SYMPTOMS TO WATCH FOR AND REPORT TO YOUR PHYSICIAN:  1. Abdominal pain or bloating, other than gas cramps.  2. Chest pain.  3. Back pain.  4. Signs of infection such as: chills or fever occurring within 24 hours   after the procedure.  5. Rectal bleeding, which would show as bright red, maroon, or black stools.   (A tablespoon of blood from the rectum is not serious, especially  if   hemorrhoids are present.)  6. Vomiting.  7. Weakness or dizziness.  GO DIRECTLY TO THE NEAREST EMERGENCY ROOM IF YOU HAVE ANY OF THE FOLLOWING:      Difficulty breathing              Chills and/or fever over 101 F   Persistent vomiting and/or vomiting blood   Severe abdominal pain   Severe chest pain   Black, tarry stools   Bleeding- more than one tablespoon   Any other symptom or condition that you feel may need urgent attention  Your doctor recommends these additional instructions:  If any biopsies were taken, your doctors clinic will contact you in 1 to 2   weeks with any results.  - Discharge patient to home.   - Resume previous diet.   - Continue present medications.   - Repeat colonoscopy in 3 years for surveillance due to fair bowel   preparation.   - Return to referring physician.   - Patient has a contact number available for emergencies.  The signs and   symptoms of potential delayed complications were discussed with the   patient.  Return to normal activities tomorrow.  Written discharge   instructions were provided to the patient.  For questions, problems or results please call your physician Vicki Krishna MD at Work:  [phone]  If you have any questions about the above instructions, call the GI   department at [phone] or call the endoscopy unit at [phone]   from 7am until 3 pm.  [facility] [place], EMERGENCY ROOM PHONE NUMBER:   [phone]  IF A COMPLICATION OR EMERGENCY SITUATION ARISES AND YOU ARE UNABLE TO REACH   YOUR PHYSICIAN - GO DIRECTLY TO THE EMERGENCY ROOM.  I have read or have had read to me these discharge instructions for my   procedure and have received a written copy.  I understand these   instructions and will follow-up with my physician if I have any questions.     __________________________________       _____________________________________  Nurse Signature                                          Patient/Designated   Responsible  Party Signature  Vicki Krishna MD  6/26/2023 10:48:27 AM  This report has been verified and signed electronically.  Dear patient,  As a result of recent federal legislation (The Federal Cures Act), you may   receive lab or pathology results from your procedure in your [facility]   account before your physician is able to contact you. Your physician or   their representative will relay the results to you with their   recommendations at their soonest availability.  Thank you,  PROVATION

## 2023-06-26 NOTE — TRANSFER OF CARE
"Anesthesia Transfer of Care Note    Patient: Rosa Isela Pate    Procedure(s) Performed: Procedure(s) (LRB):  COLONOSCOPY (N/A)    Patient location: GI    Anesthesia Type: MAC    Transport from OR: Transported from OR on room air with adequate spontaneous ventilation    Post pain: adequate analgesia    Post assessment: no apparent anesthetic complications    Post vital signs: stable    Level of consciousness: awake, alert and oriented    Nausea/Vomiting: no nausea/vomiting    Complications: none    Transfer of care protocol was followed      Last vitals:   Visit Vitals  /72 (BP Location: Left arm, Patient Position: Lying)   Pulse 72   Temp 37 °C (98.6 °F) (Temporal)   Resp 18   Ht 5' 3" (1.6 m)   Wt 77.1 kg (170 lb)   LMP  (LMP Unknown)   SpO2 98%   Breastfeeding No   BMI 30.11 kg/m²     "

## 2023-06-26 NOTE — ANESTHESIA PREPROCEDURE EVALUATION
06/26/2023  Rosa Isela Pate is a 72 y.o., female.      Pre-op Assessment    I have reviewed the Patient Summary Reports.     I have reviewed the Nursing Notes. I have reviewed the NPO Status.   I have reviewed the Medications.     Review of Systems  Social:  Smoker    Cardiovascular:   Hypertension hyperlipidemia    Pulmonary:  Pulmonary Normal    Renal/:   Chronic Renal Disease, CKD    Hepatic/GI:   Bowel Prep.    Neurological:  Neurology Normal    Endocrine:  Obesity / BMI > 30  Psych:   Psychiatric History depression          Physical Exam  General: Well nourished, Cooperative, Alert and Oriented    Airway:  Mallampati: II   Mouth Opening: Normal  TM Distance: Normal  Neck ROM: Normal ROM    Dental:  Intact        Anesthesia Plan  Type of Anesthesia, risks & benefits discussed:    Anesthesia Type: MAC, Gen Natural Airway  Intra-op Monitoring Plan: Standard ASA Monitors  Post Op Pain Control Plan: IV/PO Opioids PRN  Induction:  IV  Informed Consent: Informed consent signed with the Patient and all parties understand the risks and agree with anesthesia plan.  All questions answered.   ASA Score: 3  Day of Surgery Review of History & Physical: H&P Update referred to the surgeon/provider.I have interviewed and examined the patient. I have reviewed the patient's H&P dated: There are no significant changes.     Ready For Surgery From Anesthesia Perspective.     .

## 2023-06-26 NOTE — ANESTHESIA POSTPROCEDURE EVALUATION
Anesthesia Post Evaluation    Patient: Rosa Isela Pate    Procedure(s) Performed: Procedure(s) (LRB):  COLONOSCOPY (N/A)    Final Anesthesia Type: MAC      Patient location during evaluation: GI PACU  Patient participation: Yes- Able to Participate  Level of consciousness: awake and alert  Post-procedure vital signs: reviewed and stable  Pain management: adequate  Airway patency: patent    PONV status at discharge: No PONV  Anesthetic complications: no      Cardiovascular status: hemodynamically stable  Respiratory status: unassisted, room air and spontaneous ventilation  Hydration status: euvolemic  Follow-up not needed.          Vitals Value Taken Time   /59 06/26/23 1059   Temp  06/26/23 1130   Pulse 62 06/26/23 1059   Resp 18 06/26/23 1059   SpO2 99 % 06/26/23 1059         Event Time   Out of Recovery 11:06:00         Pain/Leon Score: Leon Score: 10 (6/26/2023 10:59 AM)

## 2023-07-07 ENCOUNTER — HOSPITAL ENCOUNTER (OUTPATIENT)
Dept: RADIOLOGY | Facility: HOSPITAL | Age: 73
Discharge: HOME | End: 2023-07-07
Attending: NURSE PRACTITIONER
Payer: MEDICARE

## 2023-07-07 VITALS — WEIGHT: 170 LBS | HEIGHT: 63 IN | BODY MASS INDEX: 30.12 KG/M2

## 2023-07-07 DIAGNOSIS — Z12.31 ENCOUNTER FOR SCREENING MAMMOGRAM FOR BREAST CANCER: ICD-10-CM

## 2023-07-07 PROCEDURE — 77067 SCR MAMMO BI INCL CAD: CPT | Mod: 26,HCNC,, | Performed by: RADIOLOGY

## 2023-07-07 PROCEDURE — 77063 BREAST TOMOSYNTHESIS BI: CPT | Mod: 26,HCNC,, | Performed by: RADIOLOGY

## 2023-07-07 PROCEDURE — 77067 SCR MAMMO BI INCL CAD: CPT | Mod: TC,HCNC

## 2023-07-07 PROCEDURE — 77067 MAMMO DIGITAL SCREENING BILAT WITH TOMO: ICD-10-PCS | Mod: 26,HCNC,, | Performed by: RADIOLOGY

## 2023-07-07 PROCEDURE — 77063 MAMMO DIGITAL SCREENING BILAT WITH TOMO: ICD-10-PCS | Mod: 26,HCNC,, | Performed by: RADIOLOGY

## 2023-07-12 ENCOUNTER — PATIENT MESSAGE (OUTPATIENT)
Dept: INTERNAL MEDICINE | Facility: CLINIC | Age: 73
End: 2023-07-12
Payer: MEDICARE

## 2023-07-12 DIAGNOSIS — R41.841 COGNITIVE COMMUNICATION DISORDER: ICD-10-CM

## 2023-07-12 DIAGNOSIS — R41.3 MEMORY LOSS: ICD-10-CM

## 2023-07-12 RX ORDER — DONEPEZIL HYDROCHLORIDE 10 MG/1
10 TABLET, FILM COATED ORAL NIGHTLY
Qty: 90 TABLET | Refills: 3 | Status: SHIPPED | OUTPATIENT
Start: 2023-07-12 | End: 2024-02-16 | Stop reason: SDUPTHER

## 2023-08-09 ENCOUNTER — PATIENT MESSAGE (OUTPATIENT)
Dept: INTERNAL MEDICINE | Facility: CLINIC | Age: 73
End: 2023-08-09
Payer: MEDICARE

## 2023-08-09 DIAGNOSIS — F32.2 MAJOR DEPRESSIVE DISORDER, SINGLE EPISODE, SEVERE WITHOUT PSYCHOTIC FEATURES: ICD-10-CM

## 2023-08-09 RX ORDER — AMLODIPINE BESYLATE 5 MG/1
TABLET ORAL
Qty: 90 TABLET | Refills: 3 | Status: SHIPPED | OUTPATIENT
Start: 2023-08-09

## 2023-08-15 ENCOUNTER — TELEPHONE (OUTPATIENT)
Dept: SMOKING CESSATION | Facility: CLINIC | Age: 73
End: 2023-08-15
Payer: MEDICARE

## 2023-08-15 NOTE — TELEPHONE ENCOUNTER
1st attempt no message regarding smoking cessation quit 1 episode, call can not be completed at this time.

## 2023-08-16 ENCOUNTER — CLINICAL SUPPORT (OUTPATIENT)
Dept: SMOKING CESSATION | Facility: CLINIC | Age: 73
End: 2023-08-16
Payer: COMMERCIAL

## 2023-08-16 DIAGNOSIS — F17.200 NICOTINE DEPENDENCE: Primary | ICD-10-CM

## 2023-08-16 PROCEDURE — 99407 PR TOBACCO USE CESSATION INTENSIVE >10 MINUTES: ICD-10-PCS | Mod: S$GLB,,,

## 2023-08-16 PROCEDURE — 99407 BEHAV CHNG SMOKING > 10 MIN: CPT | Mod: S$GLB,,,

## 2023-08-16 NOTE — PROGRESS NOTES
Spoke with patient's  today in regard to smoking cessation progress for 3 month telephone follow up, he states she is not tobacco free. Patient's  states she has cut down on smoking more than him and she is currently wearing the nicotine patch. He states they will call when ready to return to the program. Informed patient of benefit period, future follow ups, and contact information if any further help or support is needed. Will complete smart form for 3 month follow up on Quit attempt #1.

## 2023-09-08 ENCOUNTER — TELEPHONE (OUTPATIENT)
Dept: NEUROLOGY | Facility: CLINIC | Age: 73
End: 2023-09-08
Payer: MEDICARE

## 2023-09-08 ENCOUNTER — PATIENT MESSAGE (OUTPATIENT)
Dept: INTERNAL MEDICINE | Facility: CLINIC | Age: 73
End: 2023-09-08
Payer: MEDICARE

## 2023-09-08 NOTE — TELEPHONE ENCOUNTER
----- Message from Wiliam Flores sent at 9/8/2023  4:39 PM CDT -----  Contact: Cheryle Lou is needing a call back in regards to the patient. She stated that the authorization has been approved, ref # [id number]. Please give her a call back at [phone]

## 2023-10-05 ENCOUNTER — TELEPHONE (OUTPATIENT)
Dept: PSYCHIATRY | Facility: CLINIC | Age: 73
End: 2023-10-05
Payer: MEDICARE

## 2023-10-05 NOTE — TELEPHONE ENCOUNTER
----- Message from Edna Ellington sent at 10/5/2023  1:44 PM CDT -----  Contact: Pt  Is calling praveen alejandra in the ER with a family member and wants to cancel her appt for today and can be reached at [phone]/thanks/dbw

## 2023-10-23 ENCOUNTER — OFFICE VISIT (OUTPATIENT)
Dept: INTERNAL MEDICINE | Facility: CLINIC | Age: 73
End: 2023-10-23
Payer: MEDICARE

## 2023-10-23 ENCOUNTER — HOSPITAL ENCOUNTER (OUTPATIENT)
Dept: CARDIOLOGY | Facility: HOSPITAL | Age: 73
Discharge: HOME | End: 2023-10-23
Payer: MEDICARE

## 2023-10-23 VITALS
WEIGHT: 161.63 LBS | OXYGEN SATURATION: 97 % | DIASTOLIC BLOOD PRESSURE: 70 MMHG | HEART RATE: 64 BPM | SYSTOLIC BLOOD PRESSURE: 134 MMHG | HEIGHT: 63 IN | BODY MASS INDEX: 28.64 KG/M2 | TEMPERATURE: 96 F

## 2023-10-23 DIAGNOSIS — I10 ESSENTIAL HYPERTENSION: Primary | ICD-10-CM

## 2023-10-23 DIAGNOSIS — I10 ESSENTIAL HYPERTENSION: ICD-10-CM

## 2023-10-23 DIAGNOSIS — I63.81 LACUNAR INFARCTION: ICD-10-CM

## 2023-10-23 DIAGNOSIS — N18.31 CHRONIC KIDNEY DISEASE, STAGE 3A: ICD-10-CM

## 2023-10-23 DIAGNOSIS — E78.5 HYPERLIPIDEMIA, UNSPECIFIED HYPERLIPIDEMIA TYPE: ICD-10-CM

## 2023-10-23 DIAGNOSIS — R41.3 MEMORY LOSS: ICD-10-CM

## 2023-10-23 DIAGNOSIS — F32.5 MAJOR DEPRESSIVE DISORDER WITH SINGLE EPISODE, IN FULL REMISSION: ICD-10-CM

## 2023-10-23 PROCEDURE — 3078F DIAST BP <80 MM HG: CPT | Mod: HCNC,CPTII,S$GLB, | Performed by: FAMILY MEDICINE

## 2023-10-23 PROCEDURE — 3288F FALL RISK ASSESSMENT DOCD: CPT | Mod: HCNC,CPTII,S$GLB, | Performed by: FAMILY MEDICINE

## 2023-10-23 PROCEDURE — 1160F PR REVIEW ALL MEDS BY PRESCRIBER/CLIN PHARMACIST DOCUMENTED: ICD-10-PCS | Mod: HCNC,CPTII,S$GLB, | Performed by: FAMILY MEDICINE

## 2023-10-23 PROCEDURE — 3075F SYST BP GE 130 - 139MM HG: CPT | Mod: HCNC,CPTII,S$GLB, | Performed by: FAMILY MEDICINE

## 2023-10-23 PROCEDURE — 3078F PR MOST RECENT DIASTOLIC BLOOD PRESSURE < 80 MM HG: ICD-10-PCS | Mod: HCNC,CPTII,S$GLB, | Performed by: FAMILY MEDICINE

## 2023-10-23 PROCEDURE — 99999 PR PBB SHADOW E&M-EST. PATIENT-LVL IV: ICD-10-PCS | Mod: PBBFAC,HCNC,, | Performed by: FAMILY MEDICINE

## 2023-10-23 PROCEDURE — 99214 PR OFFICE/OUTPT VISIT, EST, LEVL IV, 30-39 MIN: ICD-10-PCS | Mod: HCNC,25,S$GLB, | Performed by: FAMILY MEDICINE

## 2023-10-23 PROCEDURE — 1160F RVW MEDS BY RX/DR IN RCRD: CPT | Mod: HCNC,CPTII,S$GLB, | Performed by: FAMILY MEDICINE

## 2023-10-23 PROCEDURE — G0008 FLU VACCINE - QUADRIVALENT - ADJUVANTED: ICD-10-PCS | Mod: HCNC,S$GLB,, | Performed by: FAMILY MEDICINE

## 2023-10-23 PROCEDURE — 3044F HG A1C LEVEL LT 7.0%: CPT | Mod: HCNC,CPTII,S$GLB, | Performed by: FAMILY MEDICINE

## 2023-10-23 PROCEDURE — 90694 VACC AIIV4 NO PRSRV 0.5ML IM: CPT | Mod: HCNC,S$GLB,, | Performed by: FAMILY MEDICINE

## 2023-10-23 PROCEDURE — 99999 PR PBB SHADOW E&M-EST. PATIENT-LVL IV: CPT | Mod: PBBFAC,HCNC,, | Performed by: FAMILY MEDICINE

## 2023-10-23 PROCEDURE — 1126F PR PAIN SEVERITY QUANTIFIED, NO PAIN PRESENT: ICD-10-PCS | Mod: HCNC,CPTII,S$GLB, | Performed by: FAMILY MEDICINE

## 2023-10-23 PROCEDURE — 3075F PR MOST RECENT SYSTOLIC BLOOD PRESS GE 130-139MM HG: ICD-10-PCS | Mod: HCNC,CPTII,S$GLB, | Performed by: FAMILY MEDICINE

## 2023-10-23 PROCEDURE — 3288F PR FALLS RISK ASSESSMENT DOCUMENTED: ICD-10-PCS | Mod: HCNC,CPTII,S$GLB, | Performed by: FAMILY MEDICINE

## 2023-10-23 PROCEDURE — 3044F PR MOST RECENT HEMOGLOBIN A1C LEVEL <7.0%: ICD-10-PCS | Mod: HCNC,CPTII,S$GLB, | Performed by: FAMILY MEDICINE

## 2023-10-23 PROCEDURE — 3008F PR BODY MASS INDEX (BMI) DOCUMENTED: ICD-10-PCS | Mod: HCNC,CPTII,S$GLB, | Performed by: FAMILY MEDICINE

## 2023-10-23 PROCEDURE — 90694 FLU VACCINE - QUADRIVALENT - ADJUVANTED: ICD-10-PCS | Mod: HCNC,S$GLB,, | Performed by: FAMILY MEDICINE

## 2023-10-23 PROCEDURE — 1101F PR PT FALLS ASSESS DOC 0-1 FALLS W/OUT INJ PAST YR: ICD-10-PCS | Mod: HCNC,CPTII,S$GLB, | Performed by: FAMILY MEDICINE

## 2023-10-23 PROCEDURE — 99214 OFFICE O/P EST MOD 30 MIN: CPT | Mod: HCNC,25,S$GLB, | Performed by: FAMILY MEDICINE

## 2023-10-23 PROCEDURE — 1126F AMNT PAIN NOTED NONE PRSNT: CPT | Mod: HCNC,CPTII,S$GLB, | Performed by: FAMILY MEDICINE

## 2023-10-23 PROCEDURE — 1159F PR MEDICATION LIST DOCUMENTED IN MEDICAL RECORD: ICD-10-PCS | Mod: HCNC,CPTII,S$GLB, | Performed by: FAMILY MEDICINE

## 2023-10-23 PROCEDURE — 1159F MED LIST DOCD IN RCRD: CPT | Mod: HCNC,CPTII,S$GLB, | Performed by: FAMILY MEDICINE

## 2023-10-23 PROCEDURE — 3008F BODY MASS INDEX DOCD: CPT | Mod: HCNC,CPTII,S$GLB, | Performed by: FAMILY MEDICINE

## 2023-10-23 PROCEDURE — G0008 ADMIN INFLUENZA VIRUS VAC: HCPCS | Mod: HCNC,S$GLB,, | Performed by: FAMILY MEDICINE

## 2023-10-23 PROCEDURE — 1101F PT FALLS ASSESS-DOCD LE1/YR: CPT | Mod: HCNC,CPTII,S$GLB, | Performed by: FAMILY MEDICINE

## 2023-10-23 NOTE — PROGRESS NOTES
Rosa Isela Pate  10/23/2023  [id number]    Sherrell Drummond MD  Patient Care Team:  Sherrell Drummond MD as PCP - General (Family Medicine)  Anahi Diana LPN as Care Coordinator  Jose M Potrer as Digital Medicine Health   Marc Mackay, PharmD as Hypertension Digital Medicine Clinician  Marc Mackay PharmD as Hyperlipidemia Digital Medicine Clinician (Pharmacist)  Sherrell Drummond MD as Hypertension Digital Medicine Responsible Provider (Family Medicine)  Medicare, Luis Armando Melgoza Managed as Hypertension Digital Medicine Contract  Clarissa Arias MD as Hyperlipidemia Digital Medicine Responsible Provider (Family Medicine)          Visit Type:a scheduled routine follow-up visit    Chief Complaint:  Chief Complaint   Patient presents with    Follow-up       History of Present Illness:  She has history of HTN, HLD, Lacunar Stroke with right side weakness and congitive effects, CKD3.     She is on Avalide for BP control, Metoprolol, norvasc     She is on Lipitor 80, ASA post Stroke.     For her memory issues, she was taking Aricept.  Saw Neurology in 2021  She has some behavior issues with agitation.   Neuro said Patient has symptoms of bvFTD. Plan to start aricept and may need to use antipsychotics if needed.     Previously seeing Psy for Major depressive disorder  Previously on lexapro, Buspar, Vistaril, trazodone  Per previous PCP notes, pt stopped medication herself  Intimally felt that it helped with her mood  In 2021she called EMS on her family members because they would not let her drive  She has been sent to ER previously because she was a threat to herself- felt that behavior was due to polypharmacy.  Notes suggest that better when off most of the medications.  Did see Neurology in [place]     LAbs done, all okay  Mild CKD with lower eGFR  BP in goal range    Miss her Neurology appt because her  had CABG  She is going this week  She does admit to knowing she forgets some things.  She has more  depression now with some anxiety.  She will see counseling.  She reports sleep is okay  Carpal tunnel wakes her up.        Health Maintenance Due   Topic Date Due    LDCT Lung Screen  Never done    Shingles Vaccine (2 of 2) 10/05/2021    Influenza Vaccine (1) 09/01/2023    COVID-19 Vaccine (5 - 2023-24 season) 09/01/2023     Labs in April of this year.  Ok besides CKD very mild  eGFR 59.9          History:  Past Medical History:   Diagnosis Date    Chronic kidney disease, stage 3a 04/20/2023    Depression     Hyperlipidemia     Hypertension     Obesity     Stroke     Tobacco dependence      Past Surgical History:   Procedure Laterality Date    CHOLECYSTECTOMY      removal    COLONOSCOPY N/A 11/1/2017    Procedure: COLONOSCOPY;  Surgeon: Francisco Javier Bai MD;  Location[facility] ENDO;  Service: Endoscopy;  Laterality: N/A;    COLONOSCOPY N/A 6/26/2023    Procedure: COLONOSCOPY;  Surgeon: Vicki Krishna MD;  Location: [facility] ENDO;  Service: Endoscopy;  Laterality: N/A;    HYSTERECTOMY      MYOMECTOMY      removal    OOPHORECTOMY      TUBAL LIGATION       Family History   Problem Relation Age of Onset    Stroke Mother     Hypertension Mother     Hypertension Father     Heart disease Father     Cancer Sister     Diabetes Neg Hx      Social History     Socioeconomic History    Marital status:    Tobacco Use    Smoking status: Every Day     Current packs/day: 1.00     Average packs/day: 1 pack/day for 51.0 years (51.0 ttl pk-yrs)     Types: Cigarettes    Smokeless tobacco: Never   Substance and Sexual Activity    Alcohol use: Not Currently     Comment: occ    Drug use: No    Sexual activity: Not Currently     Partners: Male     Social Determinants of Health     Financial Resource Strain: Medium Risk (10/22/2023)    Overall Financial Resource Strain (CARDIA)     Difficulty of Paying Living Expenses: Somewhat hard   Food Insecurity: Food Insecurity Present (10/22/2023)    Hunger Vital Sign     Worried About Running  Out of Food in the Last Year: Sometimes true     Ran Out of Food in the Last Year: Sometimes true   Transportation Needs: No Transportation Needs (10/22/2023)    PRAPARE - Transportation     Lack of Transportation (Medical): No     Lack of Transportation (Non-Medical): No   Physical Activity: Insufficiently Active (10/22/2023)    Exercise Vital Sign     Days of Exercise per Week: 5 days     Minutes of Exercise per Session: 10 min   Stress: Stress Concern Present (10/22/2023)    [employer] [place] [employer] - Occupational Stress Questionnaire     Feeling of Stress : Rather much   Social Connections: Moderately Integrated (10/22/2023)    Social Connection and Isolation Panel [NHANES]     Frequency of Communication with Friends and Family: More than three times a week     Frequency of Social Gatherings with Friends and Family: Twice a week     Attends Christian Services: Never     Active Member of Clubs or Organizations: Yes     Attends Club or Organization Meetings: More than 4 times per year     Marital Status:    Housing Stability: Low Risk  (10/22/2023)    Housing Stability Vital Sign     Unable to Pay for Housing in the Last Year: No     Number of Places Lived in the Last Year: 1     Unstable Housing in the Last Year: No     Patient Active Problem List   Diagnosis    Essential hypertension    Hyperlipidemia    Vasovagal syncope    Tobacco use disorder    Class 1 obesity due to excess calories in adult    Right sided weakness    Deficit in activities of daily living (ADL)    Cognitive communication disorder    Decreased activity tolerance    Decreased strength of lower extremity    Lacunar infarction    Major depressive disorder with single episode, in full remission    Chronic kidney disease, stage 3a     Review of patient's allergies indicates:   Allergen Reactions    Enalapril Other (See Comments)     Ace cough       The following were reviewed at this visit: active problem list, medication  list, allergies, family history, social history, and health maintenance.    Medications:  Current Outpatient Medications on File Prior to Visit   Medication Sig Dispense Refill    amLODIPine (NORVASC) 5 MG tablet TAKE 1 TABLET BY MOUTH ONCE DAILY APPOINTMENT NEEDED  WITH DR. CORREA FOR  FUTURE REFILLS 90 tablet 3    aspirin 81 MG Chew Take 1 tablet (81 mg total) by mouth once daily. 90 tablet 3    atorvastatin (LIPITOR) 80 MG tablet Take 1 tablet (80 mg total) by mouth nightly. 90 tablet 3    busPIRone (BUSPAR) 5 MG Tab Take by mouth.      donepeziL (ARICEPT) 10 MG tablet Take 1 tablet (10 mg total) by mouth every evening. Take one half tablet for one week then as prescribed. 90 tablet 3    irbesartan-hydrochlorothiazide (AVALIDE) 150-12.5 mg per tablet Take 1 tablet by mouth once daily. 90 tablet 3    metoprolol succinate (TOPROL-XL) 25 MG 24 hr tablet TAKE 1 TABLET BY MOUTH  DAILY 90 tablet 3    multivitamin with minerals tablet       nicotine (NICODERM CQ) 21 mg/24 hr Place 1 patch onto the skin once daily. 28 patch 0     No current facility-administered medications on file prior to visit.       Medications have been reviewed and reconciled with patient at this visit.  Barriers to medications reviewed with patient.    Adverse reactions to current medications reviewed with patient..    Over the counter medications reviewed and reconciled with patient.    Exam:  Wt Readings from Last 3 Encounters:   10/23/23 73.3 kg (161 lb 9.6 oz)   07/07/23 77.1 kg (169 lb 15.6 oz)   06/26/23 77.1 kg (170 lb)     Temp Readings from Last 3 Encounters:   10/23/23 96 °F (35.6 °C) (Tympanic)   06/26/23 98.6 °F (37 °C) (Temporal)   04/26/23 96.7 °F (35.9 °C) (Tympanic)     BP Readings from Last 3 Encounters:   10/23/23 134/70   06/26/23 (!) 147/59   04/26/23 122/66     Pulse Readings from Last 3 Encounters:   10/23/23 64   06/26/23 62   04/26/23 63     Body mass index is 28.63 kg/m².      Review of Systems   Constitutional:  Negative.  Negative for chills and fever.   HENT: Negative.  Negative for congestion, sinus pain and sore throat.    Eyes:  Negative for blurred vision and double vision.   Respiratory:  Negative for cough, sputum production, shortness of breath and wheezing.    Cardiovascular:  Negative for chest pain, palpitations and leg swelling.   Gastrointestinal:  Negative for abdominal pain, constipation, diarrhea, heartburn, nausea and vomiting.   Genitourinary: Negative.    Musculoskeletal: Negative.    Skin: Negative.  Negative for rash.   Neurological: Negative.    Endo/Heme/Allergies: Negative.  Negative for polydipsia. Does not bruise/bleed easily.   Psychiatric/Behavioral:  Negative for depression and substance abuse.      Physical Exam  Nursing note reviewed.   Cardiovascular:      Rate and Rhythm: Normal rate and regular rhythm.   Pulmonary:      Effort: Pulmonary effort is normal. No respiratory distress.   Neurological:      Mental Status: She is alert and oriented to person, place, and time.   Psychiatric:         Mood and Affect: Mood normal.         Behavior: Behavior normal.         Thought Content: Thought content normal.         Judgment: Judgment normal.         Laboratory Reviewed ({Yes)  Lab Results   Component Value Date    WBC 7.29 04/20/2023    HGB 12.5 04/20/2023    HCT 40.1 04/20/2023     04/20/2023    CHOL 146 04/20/2023    TRIG 123 04/20/2023    HDL 46 04/20/2023    ALT 23 04/20/2023    AST 17 04/20/2023     04/20/2023    K 3.6 04/20/2023     04/20/2023    CREATININE 1.0 04/20/2023    BUN 19 04/20/2023    CO2 26 04/20/2023    TSH 1.486 04/20/2023    INR 1.0 10/27/2022    HGBA1C 5.4 04/20/2023       Rosa Isela was seen today for follow-up.    Diagnoses and all orders for this visit:    Essential hypertension  -     Basic Metabolic Panel; Future    Major depressive disorder with single episode, in full remission    Hyperlipidemia, unspecified hyperlipidemia type    Lacunar  infarction    Chronic kidney disease, stage 3a  -     Basic Metabolic Panel; Future    Memory loss    Flu shot.      She has appt with Neuro this week, and then Counseling in 2 weeks.  Continues on Aricept.  Buspar for anxiety if needed.          Care Plan/Goals: Reviewed    Goals          Patient Stated      Blood Pressure < 140/90 (pt-stated)        Other      Exercise at least 150 minutes per week.       Take at least one BP reading per week at various times of the day             Follow up: No follow-ups on file.    After visit summary was printed and given to patient upon discharge today.  Patient goals and care plan are included in After Visit Summary.

## 2023-10-26 ENCOUNTER — OFFICE VISIT (OUTPATIENT)
Dept: NEUROLOGY | Facility: CLINIC | Age: 73
End: 2023-10-26
Payer: MEDICARE

## 2023-10-26 ENCOUNTER — LAB VISIT (OUTPATIENT)
Dept: LAB | Facility: HOSPITAL | Age: 73
End: 2023-10-26
Attending: NURSE PRACTITIONER
Payer: MEDICARE

## 2023-10-26 VITALS
RESPIRATION RATE: 16 BRPM | SYSTOLIC BLOOD PRESSURE: 142 MMHG | BODY MASS INDEX: 28.29 KG/M2 | HEIGHT: 63 IN | HEART RATE: 48 BPM | DIASTOLIC BLOOD PRESSURE: 71 MMHG | WEIGHT: 159.63 LBS

## 2023-10-26 DIAGNOSIS — R41.841 COGNITIVE COMMUNICATION DISORDER: ICD-10-CM

## 2023-10-26 DIAGNOSIS — R41.3 OTHER AMNESIA: ICD-10-CM

## 2023-10-26 DIAGNOSIS — M54.9 BACK PAIN, UNSPECIFIED BACK LOCATION, UNSPECIFIED BACK PAIN LATERALITY, UNSPECIFIED CHRONICITY: ICD-10-CM

## 2023-10-26 DIAGNOSIS — F17.200 TOBACCO USE DISORDER: ICD-10-CM

## 2023-10-26 DIAGNOSIS — Z78.9 DEFICIT IN ACTIVITIES OF DAILY LIVING (ADL): ICD-10-CM

## 2023-10-26 DIAGNOSIS — F02.811 MAJOR NEUROCOGNITIVE DISORDER DUE TO ANOTHER MEDICAL CONDITION, WITH AGITATION: Primary | ICD-10-CM

## 2023-10-26 DIAGNOSIS — R41.3 MEMORY LOSS: ICD-10-CM

## 2023-10-26 DIAGNOSIS — F32.5 MAJOR DEPRESSIVE DISORDER WITH SINGLE EPISODE, IN FULL REMISSION: ICD-10-CM

## 2023-10-26 DIAGNOSIS — R53.1 RIGHT SIDED WEAKNESS: ICD-10-CM

## 2023-10-26 DIAGNOSIS — I63.81 LACUNAR INFARCTION: ICD-10-CM

## 2023-10-26 DIAGNOSIS — F41.9 ANXIETY: ICD-10-CM

## 2023-10-26 DIAGNOSIS — G31.84 MCI (MILD COGNITIVE IMPAIRMENT) WITH MEMORY LOSS: ICD-10-CM

## 2023-10-26 DIAGNOSIS — R76.8 ANA POSITIVE: ICD-10-CM

## 2023-10-26 DIAGNOSIS — F02.811 MAJOR NEUROCOGNITIVE DISORDER DUE TO ANOTHER MEDICAL CONDITION, WITH AGITATION: ICD-10-CM

## 2023-10-26 LAB
FOLATE SERPL-MCNC: 17.7 NG/ML (ref 4–24)
HCYS SERPL-SCNC: 10.1 UMOL/L (ref 4–15.5)
HIV 1+2 AB+HIV1 P24 AG SERPL QL IA: NORMAL
TSH SERPL DL<=0.005 MIU/L-ACNC: 1.51 UIU/ML (ref 0.4–4)
VIT B12 SERPL-MCNC: 636 PG/ML (ref 210–950)

## 2023-10-26 PROCEDURE — 36415 COLL VENOUS BLD VENIPUNCTURE: CPT | Mod: HCNC | Performed by: NURSE PRACTITIONER

## 2023-10-26 PROCEDURE — 99417 PROLNG OP E/M EACH 15 MIN: CPT | Mod: HCNC,S$GLB,, | Performed by: NURSE PRACTITIONER

## 2023-10-26 PROCEDURE — 1101F PT FALLS ASSESS-DOCD LE1/YR: CPT | Mod: HCNC,CPTII,S$GLB, | Performed by: NURSE PRACTITIONER

## 2023-10-26 PROCEDURE — 3288F FALL RISK ASSESSMENT DOCD: CPT | Mod: HCNC,CPTII,S$GLB, | Performed by: NURSE PRACTITIONER

## 2023-10-26 PROCEDURE — 82607 VITAMIN B-12: CPT | Mod: HCNC | Performed by: NURSE PRACTITIONER

## 2023-10-26 PROCEDURE — 3044F PR MOST RECENT HEMOGLOBIN A1C LEVEL <7.0%: ICD-10-PCS | Mod: HCNC,CPTII,S$GLB, | Performed by: NURSE PRACTITIONER

## 2023-10-26 PROCEDURE — 86038 ANTINUCLEAR ANTIBODIES: CPT | Mod: HCNC | Performed by: NURSE PRACTITIONER

## 2023-10-26 PROCEDURE — 87389 HIV-1 AG W/HIV-1&-2 AB AG IA: CPT | Mod: HCNC | Performed by: NURSE PRACTITIONER

## 2023-10-26 PROCEDURE — 99999 PR PBB SHADOW E&M-EST. PATIENT-LVL V: CPT | Mod: PBBFAC,HCNC,, | Performed by: NURSE PRACTITIONER

## 2023-10-26 PROCEDURE — 3288F PR FALLS RISK ASSESSMENT DOCUMENTED: ICD-10-PCS | Mod: HCNC,CPTII,S$GLB, | Performed by: NURSE PRACTITIONER

## 2023-10-26 PROCEDURE — 86592 SYPHILIS TEST NON-TREP QUAL: CPT | Mod: HCNC | Performed by: NURSE PRACTITIONER

## 2023-10-26 PROCEDURE — 99215 PR OFFICE/OUTPT VISIT, EST, LEVL V, 40-54 MIN: ICD-10-PCS | Mod: HCNC,S$GLB,, | Performed by: NURSE PRACTITIONER

## 2023-10-26 PROCEDURE — 82746 ASSAY OF FOLIC ACID SERUM: CPT | Mod: HCNC | Performed by: NURSE PRACTITIONER

## 2023-10-26 PROCEDURE — 99417 PR PROLONGED SVC, OUTPT, W/WO DIRECT PT CONTACT,  EA ADDTL 15 MIN: ICD-10-PCS | Mod: HCNC,S$GLB,, | Performed by: NURSE PRACTITIONER

## 2023-10-26 PROCEDURE — 1159F MED LIST DOCD IN RCRD: CPT | Mod: HCNC,CPTII,S$GLB, | Performed by: NURSE PRACTITIONER

## 2023-10-26 PROCEDURE — 99999 PR PBB SHADOW E&M-EST. PATIENT-LVL V: ICD-10-PCS | Mod: PBBFAC,HCNC,, | Performed by: NURSE PRACTITIONER

## 2023-10-26 PROCEDURE — 3077F PR MOST RECENT SYSTOLIC BLOOD PRESSURE >= 140 MM HG: ICD-10-PCS | Mod: HCNC,CPTII,S$GLB, | Performed by: NURSE PRACTITIONER

## 2023-10-26 PROCEDURE — 3078F PR MOST RECENT DIASTOLIC BLOOD PRESSURE < 80 MM HG: ICD-10-PCS | Mod: HCNC,CPTII,S$GLB, | Performed by: NURSE PRACTITIONER

## 2023-10-26 PROCEDURE — 1126F AMNT PAIN NOTED NONE PRSNT: CPT | Mod: HCNC,CPTII,S$GLB, | Performed by: NURSE PRACTITIONER

## 2023-10-26 PROCEDURE — 3077F SYST BP >= 140 MM HG: CPT | Mod: HCNC,CPTII,S$GLB, | Performed by: NURSE PRACTITIONER

## 2023-10-26 PROCEDURE — 86039 ANTINUCLEAR ANTIBODIES (ANA): CPT | Mod: HCNC | Performed by: NURSE PRACTITIONER

## 2023-10-26 PROCEDURE — 3008F PR BODY MASS INDEX (BMI) DOCUMENTED: ICD-10-PCS | Mod: HCNC,CPTII,S$GLB, | Performed by: NURSE PRACTITIONER

## 2023-10-26 PROCEDURE — 1101F PR PT FALLS ASSESS DOC 0-1 FALLS W/OUT INJ PAST YR: ICD-10-PCS | Mod: HCNC,CPTII,S$GLB, | Performed by: NURSE PRACTITIONER

## 2023-10-26 PROCEDURE — 83090 ASSAY OF HOMOCYSTEINE: CPT | Mod: HCNC | Performed by: NURSE PRACTITIONER

## 2023-10-26 PROCEDURE — 99215 OFFICE O/P EST HI 40 MIN: CPT | Mod: HCNC,S$GLB,, | Performed by: NURSE PRACTITIONER

## 2023-10-26 PROCEDURE — 86235 NUCLEAR ANTIGEN ANTIBODY: CPT | Mod: 59,HCNC | Performed by: NURSE PRACTITIONER

## 2023-10-26 PROCEDURE — 3078F DIAST BP <80 MM HG: CPT | Mod: HCNC,CPTII,S$GLB, | Performed by: NURSE PRACTITIONER

## 2023-10-26 PROCEDURE — 1126F PR PAIN SEVERITY QUANTIFIED, NO PAIN PRESENT: ICD-10-PCS | Mod: HCNC,CPTII,S$GLB, | Performed by: NURSE PRACTITIONER

## 2023-10-26 PROCEDURE — 1159F PR MEDICATION LIST DOCUMENTED IN MEDICAL RECORD: ICD-10-PCS | Mod: HCNC,CPTII,S$GLB, | Performed by: NURSE PRACTITIONER

## 2023-10-26 PROCEDURE — 84443 ASSAY THYROID STIM HORMONE: CPT | Mod: HCNC | Performed by: NURSE PRACTITIONER

## 2023-10-26 PROCEDURE — 3044F HG A1C LEVEL LT 7.0%: CPT | Mod: HCNC,CPTII,S$GLB, | Performed by: NURSE PRACTITIONER

## 2023-10-26 PROCEDURE — 3008F BODY MASS INDEX DOCD: CPT | Mod: HCNC,CPTII,S$GLB, | Performed by: NURSE PRACTITIONER

## 2023-10-26 RX ORDER — BUSPIRONE HYDROCHLORIDE 5 MG/1
10 TABLET ORAL 2 TIMES DAILY
Qty: 120 TABLET | Refills: 11 | Status: SHIPPED | OUTPATIENT
Start: 2023-10-26 | End: 2024-10-25

## 2023-10-26 RX ORDER — MEMANTINE HYDROCHLORIDE 10 MG/1
10 TABLET ORAL 2 TIMES DAILY
Qty: 60 TABLET | Refills: 11 | Status: SHIPPED | OUTPATIENT
Start: 2023-10-26 | End: 2024-02-16 | Stop reason: SDUPTHER

## 2023-10-26 NOTE — PROGRESS NOTES
Subjective:       Patient ID: Rosa Isela Pate is a 73 y.o. female.    Chief Complaint: Cognitive communication disorder       Referred by: Dr. Drummond PCP     HPI The patient is here for memory loss.     The patient is presenting with memory changes that began in  2020 after a stroke per spouse. The patient states she was at work and she couldn't recall her password and work related things to do, so her boss instructed her to go to the doctor and she states she was told she had a stroke. She was started on ASA. Stroke risk factors HLD, HTN, Heavy Smoker. The main problems the patient has are related to progressive short term memory loss. For example, the patient would repeat the same question repeatedly. The patient excessively forgets where placed certain things such as keys, unable to recall how much money she has spent, forget things told to her. The patient also started forgetting names such as grand kids names. The patient is driving, she admits to getting lost going to familiar places. No confusion around and inside the house. Has trouble remembering the date and time, keeping up with medications and appointments and patient is able to recall major holidays and unaware of political changes reports that current president is [employer], (However it is President [facility] 2023) . The patient sister is handling finances. The patient is still independent with ADLs. Patient has some agitation, and no aggression. No hallucinations or delusions. No seizures. No language problems. No problems handling tools. No history of headaches. No history of hypothyroidism. No history of alcoholism. Patient is a heavy smoker 10 or more per day, No history of B12 deficiency. Chronic depression. Reports that she is not happy with living situation. She has marital conflict, patient states that her relationship is not what it use to be. No history of bipolar disorder. No history of Untreated Syphilis.  No history of HIV infection. No toxic  exposures.  No history of traumatic brain injury. No tremors or abnormal movements. No falls or instability. Urgency urinary incontinence. Difficulty with sleep and good appetite. No family history of dementia.           Review of Systems   Unable to perform ROS: Dementia   Constitutional: Negative.    HENT: Negative.     Eyes: Negative.    Respiratory: Negative.     Cardiovascular: Negative.    Gastrointestinal: Negative.    Endocrine: Negative.    Genitourinary: Negative.    Musculoskeletal:  Positive for arthralgias.   Skin: Negative.    Allergic/Immunologic: Negative.    Neurological: Negative.    Hematological: Negative.    Psychiatric/Behavioral:  Positive for agitation, confusion, decreased concentration, dysphoric mood and sleep disturbance.                  Current Outpatient Medications:     aspirin 81 MG Chew, Take 1 tablet (81 mg total) by mouth once daily., Disp: 90 tablet, Rfl: 3    atorvastatin (LIPITOR) 80 MG tablet, Take 1 tablet (80 mg total) by mouth nightly., Disp: 90 tablet, Rfl: 3    donepeziL (ARICEPT) 10 MG tablet, Take 1 tablet (10 mg total) by mouth every evening. Take one half tablet for one week then as prescribed., Disp: 90 tablet, Rfl: 3    irbesartan-hydrochlorothiazide (AVALIDE) 150-12.5 mg per tablet, Take 1 tablet by mouth once daily., Disp: 90 tablet, Rfl: 3    metoprolol succinate (TOPROL-XL) 25 MG 24 hr tablet, TAKE 1 TABLET BY MOUTH  DAILY, Disp: 90 tablet, Rfl: 3    multivitamin with minerals tablet, , Disp: , Rfl:     nicotine (NICODERM CQ) 21 mg/24 hr, Place 1 patch onto the skin once daily., Disp: 28 patch, Rfl: 0    amLODIPine (NORVASC) 5 MG tablet, TAKE 1 TABLET BY MOUTH ONCE DAILY APPOINTMENT NEEDED  WITH DR. CORREA FOR  FUTURE REFILLS (Patient not taking: Reported on 10/26/2023), Disp: 90 tablet, Rfl: 3    busPIRone (BUSPAR) 5 MG Tab, Take 2 tablets (10 mg total) by mouth 2 (two) times daily., Disp: 120 tablet, Rfl: 11    memantine (NAMENDA) 10 MG Tab, Take 1 tablet  (10 mg total) by mouth 2 (two) times daily., Disp: 60 tablet, Rfl: 11  Past Medical History:   Diagnosis Date    Chronic kidney disease, stage 3a 04/20/2023    Depression     Hyperlipidemia     Hypertension     Obesity     Stroke     Tobacco dependence      Past Surgical History:   Procedure Laterality Date    CHOLECYSTECTOMY      removal    COLONOSCOPY N/A 11/1/2017    Procedure: COLONOSCOPY;  Surgeon: Francisco Javier Bai MD;  Location: [facility] ENDO;  Service: Endoscopy;  Laterality: N/A;    COLONOSCOPY N/A 6/26/2023    Procedure: COLONOSCOPY;  Surgeon: Vicki Krishna MD;  Location: [facility] ENDO;  Service: Endoscopy;  Laterality: N/A;    HYSTERECTOMY      MYOMECTOMY      removal    OOPHORECTOMY      TUBAL LIGATION       Social History     Socioeconomic History    Marital status:    Tobacco Use    Smoking status: Every Day     Current packs/day: 1.00     Average packs/day: 1 pack/day for 51.0 years (51.0 ttl pk-yrs)     Types: Cigarettes    Smokeless tobacco: Never   Substance and Sexual Activity    Alcohol use: Not Currently     Comment: occ    Drug use: No    Sexual activity: Not Currently     Partners: Male     Social Determinants of Health     Financial Resource Strain: Medium Risk (10/22/2023)    Overall Financial Resource Strain (CARDIA)     Difficulty of Paying Living Expenses: Somewhat hard   Food Insecurity: Food Insecurity Present (10/22/2023)    Hunger Vital Sign     Worried About Running Out of Food in the Last Year: Sometimes true     Ran Out of Food in the Last Year: Sometimes true   Transportation Needs: No Transportation Needs (10/22/2023)    PRAPARE - Transportation     Lack of Transportation (Medical): No     Lack of Transportation (Non-Medical): No   Physical Activity: Insufficiently Active (10/22/2023)    Exercise Vital Sign     Days of Exercise per Week: 5 days     Minutes of Exercise per Session: 10 min   Stress: Stress Concern Present (10/22/2023)    [employer] [place] [employer] - Occupational Stress Questionnaire     Feeling of Stress : Rather much   Social Connections: Moderately Integrated (10/22/2023)    Social Connection and Isolation Panel [NHANES]     Frequency of Communication with Friends and Family: More than three times a week     Frequency of Social Gatherings with Friends and Family: Twice a week     Attends Mu-ism Services: Never     Active Member of Clubs or Organizations: Yes     Attends Club or Organization Meetings: More than 4 times per year     Marital Status:    Housing Stability: Low Risk  (10/22/2023)    Housing Stability Vital Sign     Unable to Pay for Housing in the Last Year: No     Number of Places Lived in the Last Year: 1     Unstable Housing in the Last Year: No             Past/Current Medical/Surgical History, Past/Current Social History, Past/Current Family History and Past/Current Medications were reviewed in detail.        Objective:           VITAL SIGNS WERE REVIEWED      GENERAL APPEARANCE:     The patient looks comfortable.    Body habitus is normal BMI 28.27 KG     No signs of respiratory distress.    Normal breathing pattern.    No dysmorphic features    Normal eye contact.     GENERAL MEDICAL EXAM:    HEENT:  Head is atraumatic normocephalic.     No tender temporal arteries. Fundoscopic (Ophthalmoscopic) exam showed no disc edema.      Neck and Axillae: No JVD. No visible lesions.    No carotid bruits. No thyromegaly. No lymphadenopathy.    Cardiopulmonary: No cyanosis. No tachypnea. Normal respiratory effort.    Gastrointestinal/Urogenital:  No jaundice. No stomas or lesions. No visible hernias. No catheters.     Abdomen is soft non-tender. No masses or organomegaly.    Skin, Hair and Nails: No pathognonomic skin rash. No neurofibromatosis. No visible lesions.No stigmata of autoimmune disease. No clubbing.    Skin is warm and moist. No palpable masses.    Limbs: No varicose veins. No visible swelling.    No palpable edema. Pulses  are symmetric. Pedal pulses are palpable.      Muskoskeletal: No visible deformities.No visible lesions.    No spine tenderness. No signs of longstanding neuropathy. No dislocations or fractures.            Neurologic Exam     Mental Status   Oriented to person.   Oriented to place. Oriented to country.   Disoriented to year, month and date.   Follows 2 step commands.   Attention: decreased. Concentration: decreased.   Speech: speech is normal and not slurred   Level of consciousness: alert  Knowledge: inconsistent with education and poor. Able to perform simple calculations.   Unable to name object. Able to read. Able to repeat. Able to write. Abnormal comprehension.     Cranial Nerves     CN II   Visual fields full to confrontation.   Visual acuity: decreased  Right visual field deficit: none  Left visual field deficit: none     CN III, IV, VI   Pupils are equal, round, and reactive to light.  Extraocular motions are normal.   Right pupil: Size: 2 mm. Shape: regular. Reactivity: brisk. Consensual response: intact. Accommodation: intact.   Left pupil: Size: 2 mm. Shape: regular. Reactivity: brisk. Consensual response: intact. Accommodation: intact.   CN III: no CN III palsy  CN VI: no CN VI palsy  Nystagmus: none   Diplopia: none  Ophthalmoparesis: none  Upgaze: normal  Downgaze: normal  Conjugate gaze: present  Vestibulo-ocular reflex: present    CN V   Facial sensation intact.   Right facial sensation deficit: none  Left facial sensation deficit: none    CN VII   Right facial weakness: none  Left facial weakness: none    CN VIII   CN VIII normal.   Hearing: intact    CN IX, X   CN IX normal.   CN X normal.   Palate: symmetric    CN XI   CN XI normal.   Right sternocleidomastoid strength: normal  Left sternocleidomastoid strength: normal  Right trapezius strength: normal  Left trapezius strength: normal    CN XII   CN XII normal.   Tongue: not atrophic  Fasciculations: absent  Tongue deviation: none    Motor Exam    Muscle bulk: normal  Overall muscle tone: normal  Right arm tone: normal  Left arm tone: normal  Right arm pronator drift: absent  Left arm pronator drift: absent  Right leg tone: normal  Left leg tone: normal    Strength   Strength 5/5 throughout.   Right neck flexion: 5/5  Left neck flexion: 5/5  Right neck extension: 5/5  Left neck extension: 5/5  Right deltoid: 5/5  Left deltoid: 5/5  Right biceps: 5/5  Left biceps: 5/5  Right triceps: 5/5  Left triceps: 5/5  Right wrist flexion: 5/5  Left wrist flexion: 5/5  Right wrist extension: 5/5  Left wrist extension: 5/5  Right interossei: 5/5  Left interossei: 5/5  Right iliopsoas: 5/5  Left iliopsoas: 5/5  Right quadriceps: 5/5  Left quadriceps: 5/5  Right hamstrin/5  Left hamstrin/5  Right glutei: 5/5  Left glutei: 5/5  Right anterior tibial: 5/5  Left anterior tibial: 5/5  Right posterior tibial: 5/5  Left posterior tibial: 5/5  Right peroneal: 5/5  Left peroneal: 5/5  Right gastroc: 5/5  Left gastroc: 5/5    Sensory Exam   Light touch normal.   Right arm light touch: normal  Left arm light touch: normal  Right leg light touch: normal  Left leg light touch: normal  Vibration normal.   Right arm vibration: normal  Left arm vibration: normal  Right leg vibration: normal  Left leg vibration: normal  Proprioception normal.   Right arm proprioception: normal  Left arm proprioception: normal  Right leg proprioception: normal  Left leg proprioception: normal  Pinprick normal.   Right arm pinprick: normal  Left arm pinprick: normal  Right leg pinprick: normal  Left leg pinprick: normal  Sensory deficit distribution on left: lateral cutaneous thigh  Graphesthesia: normal  Stereognosis: normal    Gait, Coordination, and Reflexes     Gait  Gait: normal    Coordination   Romberg: negative  Finger to nose coordination: normal  Heel to shin coordination: normal  Tandem walking coordination: normal    Tremor   Resting tremor: absent  Intention tremor: absent  Action  tremor: absent    Reflexes   Right brachioradialis: 2+  Left brachioradialis: 2+  Right biceps: 2+  Left biceps: 2+  Right triceps: 2+  Left triceps: 2+  Right patellar: 2+  Left patellar: 2+  Right achilles: 1+  Left achilles: 1+  Right plantar: normal  Left plantar: normal  Right Blum: absent  Left Blum: absent  Right ankle clonus: absent  Left ankle clonus: absent  Right pendular knee jerk: absent  Left pendular knee jerk: absent            JOS[facility] LUIS COGNITIVE ASSESSMENT (MOCA) TOTAL SCORE      MILD DEMENTIA 20-25    Education [employer] College       DATE 10-       TOTAL SCORE 23/30       VISUOSPATIAL EXECUTIVE (5) 4       NAMING (3) 3       ATTENTION (6) 6       LANGUAGE (3) 2       ABSTRACTION(2) 2       RECALL (5) 0       ORIENTATION (6) 6           Lab Results   Component Value Date    WBC 7.29 04/20/2023    HGB 12.5 04/20/2023    HCT 40.1 04/20/2023    MCV 96 04/20/2023     04/20/2023     Sodium   Date Value Ref Range Status   10/23/2023 141 136 - 145 mmol/L Final     Potassium   Date Value Ref Range Status   10/23/2023 3.5 3.5 - 5.1 mmol/L Final     Chloride   Date Value Ref Range Status   10/23/2023 104 95 - 110 mmol/L Final     CO2   Date Value Ref Range Status   10/23/2023 26 23 - 29 mmol/L Final     Glucose   Date Value Ref Range Status   10/23/2023 83 70 - 110 mg/dL Final     BUN   Date Value Ref Range Status   10/23/2023 19 8 - 23 mg/dL Final     Creatinine   Date Value Ref Range Status   10/23/2023 0.8 0.5 - 1.4 mg/dL Final     Calcium   Date Value Ref Range Status   10/23/2023 9.9 8.7 - 10.5 mg/dL Final     Total Protein   Date Value Ref Range Status   04/20/2023 7.1 6.0 - 8.4 g/dL Final     Albumin   Date Value Ref Range Status   04/20/2023 3.9 3.5 - 5.2 g/dL Final     Total Bilirubin   Date Value Ref Range Status   04/20/2023 0.7 0.1 - 1.0 mg/dL Final     Comment:     For infants and newborns, interpretation of results should be based  on gestational age, weight and in agreement with  clinical  observations.    Premature Infant recommended reference ranges:  Up to 24 hours.............<8.0 mg/dL  Up to 48 hours............<12.0 mg/dL  3-5 days..................<15.0 mg/dL  6-29 days.................<15.0 mg/dL       Alkaline Phosphatase   Date Value Ref Range Status   04/20/2023 65 55 - 135 U/L Final     AST   Date Value Ref Range Status   04/20/2023 17 10 - 40 U/L Final     ALT   Date Value Ref Range Status   04/20/2023 23 10 - 44 U/L Final     Anion Gap   Date Value Ref Range Status   10/23/2023 11 8 - 16 mmol/L Final     eGFR if    Date Value Ref Range Status   05/30/2022 58 (A) >60 mL/min/1.73 m^2 Final     eGFR if non    Date Value Ref Range Status   05/30/2022 51 (A) >60 mL/min/1.73 m^2 Final     Comment:     Calculation used to obtain the estimated glomerular filtration  rate (eGFR) is the CKD-EPI equation.        Lab Results   Component Value Date    [id number] 498 04/20/2023     Lab Results   Component Value Date    TSH 1.486 04/20/2023     10-    UA neg for UTI, trace protein noted recommend increase hydration    DIEGO +, DIEGO Homogenous, DIEGO Titer 1:80, RPR Neg, FA level 17.7, Vitamin B 12 636 NL, HIV neg, HC level 10.1 NL, TSH level 1.511 NL,     Labs WNL except DIEGO+ recommend follow up with Rheumatology for further evaluation     04-    MRI BRAIN WO    Examination suggestive of prior small left thalamus lacunar infarction.       12-    MRI Brain WO    There is age-appropriate atrophy. There are multiple FLAIR hyperintensities throughout the periventricular and subcortical white matter bilaterally.     There is a focus of restricted diffusion in the anterior superior left thalamus measuring approximately 1.6 cm with slight ADC hypointensity consistent with a subacute infarct.     There is no intracranial hemorrhage. There is no midline shift or hydrocephalus.     Normal T2 flow voids are present at the skull base. The vertebral basilar  system is diminutive. The visualized paranasal sinuses are well aerated. The orbital soft tissues are normal.    No acute intracranial process seen.      2020    MRA Brain     MRA Brain NL       MRI Brain WO:    11-    No acute intracranial abnormality.     Interval appearance of a tiny, remote left centrum semiovale infarct as compared to the prior MRI 2021.     Unchanged small remote infarcts of the left thalamus, right cerebellum and vermis.     Minor chronic microvascular ischemic changes.    Reviewed the neuroimaging independently       Assessment:       1. 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 Memory loss    3. Cognitive communication disorder    4. Lacunar infarction    5. Right sided weakness    6. Major depressive disorder with single episode, in full remission    7. Other amnesia    8. Anxiety    9. MCI (mild cognitive impairment) with memory loss    10. Tobacco use disorder    11. Deficit in activities of daily living (ADL)    12. Back pain, unspecified back location, unspecified back pain laterality, unspecified chronicity          Modified Zuleika Score (m-RS)      0  The patient has no residual symptoms.    1  The patient has no significant disability; able to carry out all pre-stroke activities.    2  The patient has slight disability; unable to carry out all pre-stroke activities but able to look after self without daily help.    3  The patient has moderate disability; requiring some external help but able to walk without the assistance of another individual.    4  The patient has moderately severe disability; unable to walk or attend to bodily functions without assistance of another individual.    5  The patient has severe disability; bedridden, incontinent, requires continuous care.    6  The patient has  (during the hospital stay or after discharge from the hospital).    Plan:         MAJOR MILD NEUROCOGNITIVE DISORDER DUE  POSSIBLE VASCULAR DEMENTIA OR DUE TO ANOTHER CONDITION WITH AGITATION/ HX OF STROKE MDD/CODIE/ TOBACCO USE/         EVALUATION     Brain MRI WO     TSH-T4, FA, UA,  B12, HIV, RPR.    Comprehensive Neuropsychological Testing     DriveAble to assess the cognitive aspects of driving.       Follow up with Psychiatry for optimization of CODIE/MDD           HX OF STROKE (HTN, HLD SMOKER)      ASA 81 mg QD      Vascular Risk Factors (VRFs) stratification (BP, BS, BC control and Smoking Cessation) is the mainstay of stroke prevention (>90%). The benefit of antiplatelets is < 20% stroke risk reduction.         Healthy diet and exercise    Avoid driving.    Call 911 if any SUDDEN:   Weakness  Numbness  Slurring of speech  Speech difficulty   Vertigo  Loss of balance  Loss of vision  Loss of hearing  Double vision  Trouble swallowing  Trouble breathing  Facial drooping      DISEASE-MODIFYING AGENTS     Explained to the patient and family that medications are intended to slow down the progression (in the early stages of the disease) and not stop it or reverse the disease. The disease is relentless, progressive and so far, cannot be controlled. Progression includes Cognitive decline, Behavioral disturbances, and Motor decline as well.     I counseled the patient and family that the rate of progression is extremely variable, and the average (10 years) is an inaccurate measure.     CLASSES:    NMDA RECEPTOR ANTAGONISTS    Will start memantine/Namenda and titrate slowly to 10 mg BID. The side effects include dizziness, seizures and nightmares and discussed with patient and family. (Other formulations include Namenda XR)    CHOLINESTERASE INHIBITORS (CEI)    Once stable on Namenda will start donepezil/Aricept and titrate slowly to 10 mg QHS. The side effects include dizziness, diarrhea and nightmares and discussed with patient and family.  It can rarely cause bradycardia, syncope, and prolonged QT.     If GI symptoms become an issue  will try rivastigmine/Exelon patches. Will obtain EKG before and after Aricept to verify effects on QT interval!  (Other formulations Adlarity TTS 5/10 mg weekly). Patches are convenient, bypass the GI system but have unintended consequences of being taken off prematurely or being duplicated accidentally.       MISCELLANEOUS     [facility] study regarding Sildenafil (Viagra) impact on dementia showed possible benefit. Will wait for high quality prospective double-blinded placebo-controlled trials to make any proper recommendations.     Recommended against the use of OTC non-FDA evaluated supplements and claims.         SYMPTOMATIC MANAGEMENT-BEHAVIORAL SYMPTOMS AND NON-COGNITIVE SYMPTOMS       ANTIDEPRESSANTS CLASS MEDICATIONS    Follow up with psychiatry for behavioral management     Bupsar 10 mg po BID anxiety symptoms.     HOME CARE    Falling Down Precautions. Gait is affected by the disease as well.     Avoid driving and access to firearms     Incremental 24/Care     Help with finances and decision making.    Join support group.    Proofing the house and use labeling.    Avoid antihistamines and anticholinergics.    Avoid changing routine.    Use written reminders.    Avoid multitasking.    Healthy diet, exercise (physical and cognitive).    Good sleep hygiene.        HERE ARE 10 WAYS TO REDUCE RISK OF DEMENTIA AND SLOW DOWN THE PROGRESSION OF DEMENTIA        1.Be physically active.    2. Avoid smoking and alcohol consumption.    3. Track your numbers. Keep your blood pressure, cholesterol, blood sugar and weight within recommended ranges.    4. Stay socially connected.    5. Make healthy food choices. Eat a well-balance and healthy diet that rich in cereals, fish, legumes, and vegetables.    6. Reduce stress.     7. Challenge your brain by trying something new, playing games, or learning a new language.    8. Take care of your hearing. Avoid being continuously exposed to loud sounds and wear a hearing  aid if hearing does become a problem.    9. Lower risk of falls. Consider installing handrails on all stairs and grab bars in bathrooms.    10. Reduce your exposure to air pollution, such as exposure to exhaust in heavy traffic.         CAREGIVERS COUNSELING     Recommend reading the following books:     The 36-Hour Day and there are many online and printed resources to help caregivers as well.     Alzheimer's Through the Stages.     Dementia with G.R.A.C.E.            PREVENTION OF DELIRIUM       1. Good hydration and avoid electrolyte imbalance  2. Recognize and treat infections immediately especially UTI.  3. Bladder emptying and prevent constipation.   4. Provide stimulating activities and familiar objects  5. Use eyeglasses and hearing aids if needed.   6. Use simple and regular communication about people, current place, and time  7. Mobility and range-of-motion exercises  8. Reduce noise, lighting and avoid sleep interruptions  9. Non-narcotic pain management.  10.Nondrug treatment for sleep problems or anxiety  11. Avoid antihistamines.  12. Avoid narcotics.  13. Avoid benzodiazepines.            HELPFUL STRATEGIES FOR THE FAMILY AND CAREGIVERS        BEHAVIORAL MANAGEMENT STRATEGIES     Remember that you cannot reason with acute psychosis or confusion. Unless there is an immediate safety issue, there is no need to challenge or correct the person.  For example, if a pt is hallucinating, do not argue with the person that what they are seeing is not real. If they are expressing paranoia, empathize gently with their fear, and attempt to redirect them to a different activity.   If the person is particularly stuck on a topic or activity, as long as the activity is safe and is not worsening their agitation, you don't need to intervene.     Communicate calmly, simply, and concisely    Reassure the person that they are safe and you are here to help  Try not to express irritation or anger  Speak quietly and calmly, do  "not shout or threaten the person. Avoid provocation.   Establish verbal contact and provide orientation and reassurance.  Attempt to identify the patient's wants and feelings, listen to what they are saying, and reflect those wants and feelings back to the patient.  Dont use sarcasm as a weapon    Set clear limits on behavior in a calm voice ("You cannot hit the nurse.")  Offer choices and optimism ("Which toothpaste would you like to use today?")    Redirect the person to an alternative behavior ("Can you come help me with this?)    Avoid saying things like, "We already went over this!" "You can't keep doing this." "I already explained this to you"  Instead, simply nod calmly and acknowledge what the person is saying ("Yes, that makes sense."), and then request their help or company in a different behavior.   Having a mental list of activities the patient enjoys can be helpful with redirection. For example, do they enjoy going for walks? Eating a piece of candy?   If redirection becomes challenging, you can place objects that your family member enjoys strategically in the home in order to use for distraction. This is particularly useful if there are items that they often talk about or frequently ask you questions about; or if they are visually striking. Once you focus the person on this object, talk about it briefly until they are calm and then attempt to redirect to a new behavior.   Sometimes activities which are repetitive can be calming, particularly if there is a comforting sensory element. For example, pt may enjoy folding soft towels or laundry.    Limit overstimulation    Decrease distractions by turning off the TV, computer, any fluorescent lights that hum, etc.  Ask any casual visitors to leave--the fewer people the better  If the person is very agitated, avoid touching them, and respect their personal space  Sit down and ask the person to sit down also     PREVENTATIVE STRATEGIES     Try to help the " "patient develop a routine and stick to it  Address all immediate safety issues  Does the person still have access to the car and keys? Take the keys away, or disconnect the car battery.  Are they wandering at night? Install locks on the outside doors. A keypad lock works well. Alarms on bedroom doors can also be helpful.   Are they turning on the stove and risking a fire? If you cannot limit their access to the kitchen, you may need to unplug dangerous devices any time you are not in the room.   If the person is exhibiting poor judgment throughout the day, they likely need someone supervising them at all times.   Do they still have access to significant financial assets? Limit their access to accounts, and consult with a  if necessary.   Identify situations that seem to trigger agitation or a problematic behavior, and modify the person's environment to minimize or eliminate that trigger.   For example, is their behavior worse if they don't get a good night's sleep? Or if they don't eat or drink enough? If so, prioritize those activities and build behavior plans to support them.  Do they get upset about not being allowed to answer the phone when it rings? Put all of the phones in the house on "silent."   Do they become paranoid that certain family members are trying to take advantage of them? Limit contact with the targeted family member as much as possible, and re-introduce them slowly after some time has passed.   Encourage independence in daily living whenever safely possible  Encourage collaborative decision-making regarding his care as well as daily activities  Encourage regular physical exercise  Address issues that may be impacting sleep   Ex: Urology consult for frequent nighttime urination, address chronic pain that may be interfering with sleep, moving to a different room if his roommate snores loudly, make sure the room is a comfortable temperature and he has adequate pillows and blankets  Ensure he " gets out into the sunlight at least once per day to encourage regular circadian cycle  No caffeine, sugar, or large meals within two hours of bedtime   Make sure room at night is dark and quiet and minimize nighttime interruptions from staff unless medically necessary   Try to help pt go to sleep and wake up at the same time every day  Monitor closely for worsening psychiatric symptoms (insomnia, social withdrawal, deterioration of personal hygiene, hostility, confusing or nonsensical speech, paranoia, hallucinations) and intervene promptly. Assess for possible antecedents, modify environment appropriately.            SLEEP HYGIENE     Poor sleep has a negative effect on cognition. Several strategies have been shown to improve sleep:     Caffeine intake in the afternoon and evening, as well as stuffing oneself at supper, can decrease the quality of restful sleep throughout the night.   Bedtime and wake-up times should be consistent every night and morning so the body becomes used to a single routine, even on the weekends.  Engage in daily physical activity, but not 2-3 hours before bedtime.   No technology use (television, computer, iPad) 1-2 hours before bed.   Have a wind down routine (e.g., soft lights in the house, bath before bed, reduced fluid intake, songs, reading, less noise) to promote sleep readiness.   Visit the www.sleepfoundation.org for more strategies.      COGNITIVE HYGIENE     Engage in regular exercise, which increases alertness and arousal and can improve attention and focus.  Consider lower impact exercises, such as yoga or light walking.  Get a good nights sleep, as this can enhance alertness and cognition.  Eat healthy foods and balanced meals. It is notable that research indicates certain nutrients may aid in brain function, such as B vitamins (especially B6, B12, and folic acid), antioxidants (such as vitamins C and E, and beta carotene), and Omega-3 fatty acids. Talk with your physician  or nutritionist about whats right for you.   Keep your brain active. Find activities to stay mentally active, such as reading, games (cards, checkers), puzzles (crosswords, Sudoku, jig saw), crafts (models, woodworking), gardening, or participating in activities in the community.  Stay socially engaged. Continue staying active with your family and friends.      FUTURE PLANNING     The patient and caregivers should consider formal arrangements to allow a designated person to make medical and financial decisions for the pt, should he/she become unable to do so.  Options to consider include designating a healthcare proxy, medical and/or financial power of , and completing advanced directives for healthcare decisions and estate planning (e.g., finalizing a will).  If cost is prohibitive, [facility] [employer] [facility] [employer] (https://[employer]/) provides free  for individuals with low income.       ADDITIONAL RESOURCES     [facility] (www.http://alzbr.org) have good local caregiver and patient resources.   Consider resources for support through the Governors Office of Elderly Affairs (http://goea.[place].gov/), [place] Chapter of the Alzheimers Association (www.alz.org/[facility]is[facility] [place]/), the Family Caregiver [place] (www.caregiver.org), and the American Psychological Association (http://www.apa.org/pi/about/publications/caregivers/consumers/index.aspxconsumers/index.aspx).  For More Information About Hallucinations, Delusions, and Paranoia in Alzheimer's [facility] Alzheimer's and related Dementias Education and Referral (ADEAR) Center 1-733.968.9775 (toll-free) adear@efren.nih.gov www.efren.nih.gov/alzheimers The National [place] [facility]'s ADEAR Center offers information and free print publications about Alzheimer's disease and related dementias for families, caregivers, and health professionals. ADEAR Center staff answer telephone, email, and written requests and make referrals to  local and national resources            MEDICAL/SURGICAL COMORBIDITIES     All relevant medical comorbidities noted and managed by primary care physician and medical care team.          MISCELLANEOUS MEDICAL PROBLEMS       HEALTHY LIFESTYLE AND PREVENTATIVE CARE    Encouraged the patient to adhere to the age-appropriate health maintenance guidelines including screening tests and vaccinations.     Discussed the overall importance of healthy lifestyle, optimal weight, exercise, healthy diet, good sleep hygiene and avoiding drugs including smoking, alcohol and recreational drugs. The patient verbalized full understanding.       Advised the patient to follow COVID-19 prevention measures.       I spent 125 minutes total E/M more than 50 % spent face to face with the patient    Time spent in counseling and coordination of care including discussions etiology of diagnosis, pathogenesis of diagnosis, prognosis of diagnosis,, diagnostic results, impression and recommendations, diagnostic studies, management, risks and benefits of treatment, instructions of disease self-management, treatment instructions, follow up requirements, patient and family counseling/involvement in care compliance with treatment regimen. All of the patient's questions were answered during this discussion.     Kala Thorpe, MSN NP      Collaborating Provider: Cali Piedra MD, FAAN Neurologist/Epileptologist

## 2023-10-26 NOTE — PATIENT INSTRUCTIONS
HERE ARE 10 WAYS TO REDUCE RISK OF DEMENTIA AND SLOW DOWN THE PROGRESSION OF DEMENTIA        1.Be physically active.    2. Avoid smoking and alcohol consumption.    3. Track your numbers. Keep your blood pressure, cholesterol, blood sugar and weight within recommended ranges.    4. Stay socially connected.    5. Make healthy food choices. Eat a well-balance and healthy diet that rich in cereals, fish, legumes, and vegetables.    6. Reduce stress.     7. Challenge your brain by trying something new, playing games, or learning a new language.    8. Take care of your hearing. Avoid being continuously exposed to loud sounds and wear a hearing aid if hearing does become a problem.    9. Lower risk of falls. Consider installing handrails on all stairs and grab bars in bathrooms.    10. Reduce your exposure to air pollution, such as exposure to exhaust in heavy traffic.         CAREGIVERS COUNSELING     Recommend reading the following books:     The 36-Hour Day and there are many online and printed resources to help caregivers as well.     Alzheimer's Through the Stages.     Dementia with G.R.A.C.E.            PREVENTION OF DELIRIUM       1. Good hydration and avoid electrolyte imbalance  2. Recognize and treat infections immediately especially UTI.  3. Bladder emptying and prevent constipation.   4. Provide stimulating activities and familiar objects  5. Use eyeglasses and hearing aids if needed.   6. Use simple and regular communication about people, current place, and time  7. Mobility and range-of-motion exercises  8. Reduce noise, lighting and avoid sleep interruptions  9. Non-narcotic pain management.  10.Nondrug treatment for sleep problems or anxiety  11. Avoid antihistamines.  12. Avoid narcotics.  13. Avoid benzodiazepines

## 2023-10-27 ENCOUNTER — PATIENT MESSAGE (OUTPATIENT)
Dept: GASTROENTEROLOGY | Facility: CLINIC | Age: 73
End: 2023-10-27
Payer: MEDICARE

## 2023-10-27 LAB
ANA PATTERN 1: NORMAL
ANA SER QL IF: POSITIVE
ANA TITR SER IF: NORMAL {TITER}
RPR SER QL: NORMAL

## 2023-10-30 ENCOUNTER — TELEPHONE (OUTPATIENT)
Dept: NEUROLOGY | Facility: CLINIC | Age: 73
End: 2023-10-30
Payer: MEDICARE

## 2023-10-30 NOTE — PROGRESS NOTES
Labs Reviewed:         10-    UA neg for UTI, trace protein noted recommend increase hydration    DIEGO +, DIEGO Homogenous, DIEGO Titer 1:80, RPR Neg, FA level 17.7, Vitamin B 12 636 NL, HIV neg, HC level 10.1 NL, TSH level 1.511 NL,     Labs WNL except DIEGO+ recommend follow up with Rheumatology for further evaluation

## 2023-10-30 NOTE — TELEPHONE ENCOUNTER
----- Message from Lacey Thorpe NP sent at 10/30/2023  1:35 PM CDT -----  Labs Reviewed:         10-    UA neg for UTI, trace protein noted recommend increase hydration    DIEGO +, DIEGO Homogenous, DIEGO Titer 1:80, RPR Neg, FA level 17.7, Vitamin B 12 636 NL, HIV neg, HC level 10.1 NL, TSH level 1.511 NL,     Labs WNL except DIEGO+ recommend follow up with Rheumatology for further evaluation

## 2023-10-30 NOTE — TELEPHONE ENCOUNTER
----- Message from Lacey Thorpe NP sent at 10/30/2023  1:33 PM CDT -----  Labs Reviewed:    10-    UA neg for UTI, trace protein noted recommend increase hydration

## 2023-10-31 ENCOUNTER — PATIENT MESSAGE (OUTPATIENT)
Dept: NEUROLOGY | Facility: CLINIC | Age: 73
End: 2023-10-31
Payer: MEDICARE

## 2023-10-31 LAB
ANTI SM ANTIBODY: 0.09 RATIO (ref 0–0.99)
ANTI SM/RNP ANTIBODY: 0.06 RATIO (ref 0–0.99)
ANTI-SM INTERPRETATION: NEGATIVE
ANTI-SM/RNP INTERPRETATION: NEGATIVE
ANTI-SSA ANTIBODY: 0.08 RATIO (ref 0–0.99)
ANTI-SSA INTERPRETATION: NEGATIVE
ANTI-SSB ANTIBODY: 0.11 RATIO (ref 0–0.99)
ANTI-SSB INTERPRETATION: NEGATIVE
DSDNA AB SER-ACNC: NORMAL [IU]/ML

## 2023-11-02 ENCOUNTER — OFFICE VISIT (OUTPATIENT)
Dept: PSYCHIATRY | Facility: CLINIC | Age: 73
End: 2023-11-02
Payer: MEDICARE

## 2023-11-02 DIAGNOSIS — F32.5 MAJOR DEPRESSIVE DISORDER WITH SINGLE EPISODE, IN FULL REMISSION: ICD-10-CM

## 2023-11-02 DIAGNOSIS — F41.9 ANXIETY: Primary | ICD-10-CM

## 2023-11-02 DIAGNOSIS — Z63.0 MARITAL CONFLICT: ICD-10-CM

## 2023-11-02 PROCEDURE — 90791 PR PSYCHIATRIC DIAGNOSTIC EVALUATION: ICD-10-PCS | Mod: HCNC,S$GLB,, | Performed by: SOCIAL WORKER

## 2023-11-02 PROCEDURE — 99999 PR PBB SHADOW E&M-EST. PATIENT-LVL I: ICD-10-PCS | Mod: PBBFAC,HCNC,, | Performed by: SOCIAL WORKER

## 2023-11-02 PROCEDURE — 99999 PR PBB SHADOW E&M-EST. PATIENT-LVL I: CPT | Mod: PBBFAC,HCNC,, | Performed by: SOCIAL WORKER

## 2023-11-02 PROCEDURE — 3044F PR MOST RECENT HEMOGLOBIN A1C LEVEL <7.0%: ICD-10-PCS | Mod: HCNC,CPTII,S$GLB, | Performed by: SOCIAL WORKER

## 2023-11-02 PROCEDURE — 90791 PSYCH DIAGNOSTIC EVALUATION: CPT | Mod: HCNC,S$GLB,, | Performed by: SOCIAL WORKER

## 2023-11-02 PROCEDURE — 3044F HG A1C LEVEL LT 7.0%: CPT | Mod: HCNC,CPTII,S$GLB, | Performed by: SOCIAL WORKER

## 2023-11-02 SDOH — SOCIAL DETERMINANTS OF HEALTH (SDOH): PROBLEMS IN RELATIONSHIP WITH SPOUSE OR PARTNER: Z63.0

## 2023-11-02 NOTE — PROGRESS NOTES
Psychiatry Initial Visit (PhD/LCSW)  Diagnostic Interview - CPT 97211    Date: 11/2/2023    Site: [place]    Referral source: Nancy Bauman NP    Clinical status of patient: Outpatient    Rosa Isela Pate, a 73 y.o. female, for initial evaluation visit.  Met with patient.    Chief complaint/reason for encounter: depression, anxiety, and interpersonal        11/2/2023     1:30 PM 5/24/2023     1:26 PM   PHQ-9 Depression Patient Health Questionnaire   Patient agreed to terms: Yes Yes   Little interest or pleasure in doing things 1 1   Feeling down, depressed, or hopeless 2 1   Trouble falling or staying asleep, or sleeping too much 2 2   Feeling tired or having little energy 2 2   Poor appetite or overeating 1 0   Feeling bad about yourself - or that you are a failure or have let yourself or your family down 3 0   Trouble concentrating on things, such as reading the newspaper or watching television 3 0   Moving or speaking so slowly that other people could have noticed. Or the opposite - being so fidgety or restless that you have been moving around a lot more than usual 1 0   Thoughts that you would be better off dead, or of hurting yourself in some way 0 0   PHQ-9 Total Score 15 6   If you checked off any problems, how difficult have these problems made it for you to do your work, take care of things at home, or get along with other people? Somewhat difficult Somewhat difficult   Interpretation Moderately Severe Mild            No data to display                History of present illness:  Met with this patient for her [facility].  The patient was on time for her appointment, alert and oriented.  The patient stated that she moved to [place] from [place] a few years ago after having a stroke and having to retire from her job working at the [employer].  She has been  to her  Jas for many years and they have 2  children together, a daughter and a son.  The patient also has another son by her 1st marriage.  She states that she and her  also have 6 grandchildren.  The patient had a stroke but did not realize it for over 2 days and so she has some issues with memory since she had the stroke.  She states that her  said she was combative after the stroke and she came to [place] to live with her sister and started collecting her social security.  This left her  living in [place] with their daughter and her 4 children.  Without the patient's [employer] income, her  could not pay all the bills and lost the house.  At that time he came to live with her in [place] and they now live with a in a 1 bedroom apartment with her daughter's oldest son, age 22.  This boy does not work but stays home and plays video games all day.  She states that she was  from her  for about a year and since he has moved back in with her they argue more than they used to and she thinks that he is bitter against her for moving out.  She states that they now have furniture in storage units in both [place] and [place].  Discussed selling or giving away some of the furniture and reducing the storage units to just 1.  The patient seems to have no energy for this type of project and none of her children or grandchildren seem to want to take this on either.  She states that both she and her  are strong willed and both want things done their own way. This causes conflict between them. Discussed ways of avoiding conflict.  She states that she stays at her sister's to visit quite often.  And now she has her sister help her with all of the financial expenses because she does not trust her .  Discussed ways to keep peace in the house and the possibility of  from him again.  She states she will make a follow-up.    Pain: noncontributory    Symptoms:   Mood: depressed mood, fatigue,  and poor concentration  Anxiety: excessive anxiety/worry, restlessness/keyed up, and irritability  Substance abuse: denied  Cognitive functioning: denied  Health behaviors: noncontributory    Psychiatric history: psychotropic management by PCP    Medical history:   Past Medical History:   Diagnosis Date    Chronic kidney disease, stage 3a 04/20/2023    Depression     Hyperlipidemia     Hypertension     Obesity     Stroke     Tobacco dependence        Family history of psychiatric illness:   Family History   Problem Relation Age of Onset    Stroke Mother     Hypertension Mother     Hypertension Father     Heart disease Father     Cancer Sister     Diabetes Neg Hx         Social history (marriage, employment, etc.):   Social History     Social History Narrative    Not on file        Substance use:     Social History     Tobacco Use    Smoking status: Every Day     Current packs/day: 1.00     Average packs/day: 1 pack/day for 51.0 years (51.0 ttl pk-yrs)     Types: Cigarettes    Smokeless tobacco: Never   Substance Use Topics    Alcohol use: Not Currently     Comment: occ        Current medications and drug reactions (include OTC, herbal):    Current Outpatient Medications:     amLODIPine (NORVASC) 5 MG tablet, TAKE 1 TABLET BY MOUTH ONCE DAILY APPOINTMENT NEEDED  WITH DR. CORREA FOR  FUTURE REFILLS (Patient not taking: Reported on 10/26/2023), Disp: 90 tablet, Rfl: 3    aspirin 81 MG Chew, Take 1 tablet (81 mg total) by mouth once daily., Disp: 90 tablet, Rfl: 3    atorvastatin (LIPITOR) 80 MG tablet, Take 1 tablet (80 mg total) by mouth nightly., Disp: 90 tablet, Rfl: 3    busPIRone (BUSPAR) 5 MG Tab, Take 2 tablets (10 mg total) by mouth 2 (two) times daily., Disp: 120 tablet, Rfl: 11    donepeziL (ARICEPT) 10 MG tablet, Take 1 tablet (10 mg total) by mouth every evening. Take one half tablet for one week then as prescribed., Disp: 90 tablet, Rfl: 3    irbesartan-hydrochlorothiazide (AVALIDE) 150-12.5 mg per  tablet, Take 1 tablet by mouth once daily., Disp: 90 tablet, Rfl: 3    memantine (NAMENDA) 10 MG Tab, Take 1 tablet (10 mg total) by mouth 2 (two) times daily., Disp: 60 tablet, Rfl: 11    metoprolol succinate (TOPROL-XL) 25 MG 24 hr tablet, TAKE 1 TABLET BY MOUTH  DAILY, Disp: 90 tablet, Rfl: 3    multivitamin with minerals tablet, , Disp: , Rfl:     nicotine (NICODERM CQ) 21 mg/24 hr, Place 1 patch onto the skin once daily., Disp: 28 patch, Rfl: 0      Strengths and liabilities: Strength: Patient accepts guidance/feedback, Liability: Patient lacks coping skills.    Current Evaluation:     Mental Status Exam:  General Appearance:  unremarkable, age appropriate   Speech: normal tone, normal rate, normal pitch, normal volume      Level of Cooperation: cooperative      Thought Processes: normal and logical   Mood: dysthymic, irritable      Thought Content: normal, no suicidality, no homicidality, delusions, or paranoia   Affect: congruent and appropriate   Orientation: Oriented x3   Memory: recent >  intact   Attention Span & Concentration: intact   Fund of General Knowledge: intact and appropriate to age and level of education   Abstract Reasoning: interpretation of similarities was abstract   Judgment & Insight: fair     Language  intact     Diagnostic Impression - Plan:       ICD-10-CM ICD-9-CM   1. Anxiety  F41.9 300.00   2. Major depressive disorder with single episode, in full remission  F32.5 296.26   3. Marital conflict  Z63.0 V61.10       Plan:individual psychotherapy    Return to Clinic: as scheduled    Length of Service (minutes): Henok Grady LCSW  11/02/2023   3:19 PM

## 2023-11-07 DIAGNOSIS — I10 ESSENTIAL HYPERTENSION: ICD-10-CM

## 2023-11-07 RX ORDER — METOPROLOL SUCCINATE 25 MG/1
TABLET, EXTENDED RELEASE ORAL
Qty: 90 TABLET | Refills: 3 | Status: SHIPPED | OUTPATIENT
Start: 2023-11-07 | End: 2023-12-28

## 2023-11-07 NOTE — TELEPHONE ENCOUNTER
No care due was identified.  [facility] Embedded Care Due Messages. Reference number: [id number].   11/07/2023 7:59:59 AM CST

## 2023-11-07 NOTE — TELEPHONE ENCOUNTER
Refill Routing Note   Medication(s) are not appropriate for processing by [facility] for the following reason(s):      Required vitals abnormal    [facility] action(s):  Defer Care Due:  None identified            Appointments  past 12m or future 3m with PCP    Date Provider   Last Visit   10/23/2023 Sherrell Drummond MD   Next Visit   Visit date not found Sherrell Drummond MD   ED visits in past 90 days: 0        Note composed:2:34 PM 11/07/2023

## 2023-11-13 ENCOUNTER — CLINICAL SUPPORT (OUTPATIENT)
Dept: SMOKING CESSATION | Facility: CLINIC | Age: 73
End: 2023-11-13
Payer: COMMERCIAL

## 2023-11-13 DIAGNOSIS — F17.200 NICOTINE DEPENDENCE: Primary | ICD-10-CM

## 2023-11-13 PROCEDURE — 99406 BEHAV CHNG SMOKING 3-10 MIN: CPT | Mod: S$GLB,,,

## 2023-11-13 PROCEDURE — 99406 PR TOBACCO USE CESSATION INTERMEDIATE 3-10 MINUTES: ICD-10-PCS | Mod: S$GLB,,,

## 2023-11-13 NOTE — PROGRESS NOTES
Spoke with patient today in regard to smoking cessation progress for 6 month telephone follow up, she states not tobacco free.  Patient states not ready to schedule for the program at this time.  Patient states she will contact me at a later time to schedule an appointment. Informed patient of benefit period, future follow up, and contact information if any further help or support is needed.  Will complete smart form for 6 month follow up on Quit attempt #1.

## 2023-11-15 ENCOUNTER — HOSPITAL ENCOUNTER (OUTPATIENT)
Dept: RADIOLOGY | Facility: HOSPITAL | Age: 73
Discharge: HOME | End: 2023-11-15
Attending: NURSE PRACTITIONER
Payer: MEDICARE

## 2023-11-15 DIAGNOSIS — I63.81 LACUNAR INFARCTION: ICD-10-CM

## 2023-11-15 DIAGNOSIS — F32.5 MAJOR DEPRESSIVE DISORDER WITH SINGLE EPISODE, IN FULL REMISSION: ICD-10-CM

## 2023-11-15 DIAGNOSIS — R41.841 COGNITIVE COMMUNICATION DISORDER: ICD-10-CM

## 2023-11-15 DIAGNOSIS — R53.1 RIGHT SIDED WEAKNESS: ICD-10-CM

## 2023-11-15 DIAGNOSIS — R41.3 MEMORY LOSS: ICD-10-CM

## 2023-11-15 DIAGNOSIS — R41.3 OTHER AMNESIA: ICD-10-CM

## 2023-11-15 DIAGNOSIS — F41.9 ANXIETY: ICD-10-CM

## 2023-11-15 DIAGNOSIS — F02.811 MAJOR NEUROCOGNITIVE DISORDER DUE TO ANOTHER MEDICAL CONDITION, WITH AGITATION: ICD-10-CM

## 2023-11-15 PROCEDURE — 70551 MRI BRAIN WITHOUT CONTRAST: ICD-10-PCS | Mod: 26,HCNC,, | Performed by: RADIOLOGY

## 2023-11-15 PROCEDURE — 70551 MRI BRAIN STEM W/O DYE: CPT | Mod: TC,HCNC

## 2023-11-15 PROCEDURE — 70551 MRI BRAIN STEM W/O DYE: CPT | Mod: 26,HCNC,, | Performed by: RADIOLOGY

## 2023-11-28 ENCOUNTER — TELEPHONE (OUTPATIENT)
Dept: NEUROLOGY | Facility: CLINIC | Age: 73
End: 2023-11-28
Payer: MEDICARE

## 2023-11-28 NOTE — TELEPHONE ENCOUNTER
----- Message from Iliana Lentz sent at 11/28/2023  2:48 PM CST -----  Contact: self [phone]  Patient called back after missing a call to discuss results. Please call back [phone]. Thanks ITW

## 2023-12-02 ENCOUNTER — PATIENT MESSAGE (OUTPATIENT)
Dept: INTERNAL MEDICINE | Facility: CLINIC | Age: 73
End: 2023-12-02
Payer: MEDICARE

## 2023-12-02 DIAGNOSIS — R05.8 ACE-INHIBITOR COUGH: ICD-10-CM

## 2023-12-02 DIAGNOSIS — I10 ESSENTIAL HYPERTENSION: ICD-10-CM

## 2023-12-02 DIAGNOSIS — T46.4X5A ACE-INHIBITOR COUGH: ICD-10-CM

## 2023-12-04 RX ORDER — IRBESARTAN AND HYDROCHLOROTHIAZIDE 150; 12.5 MG/1; MG/1
1 TABLET, FILM COATED ORAL DAILY
Qty: 90 TABLET | Refills: 3 | Status: SHIPPED | OUTPATIENT
Start: 2023-12-04 | End: 2024-03-11 | Stop reason: SDUPTHER

## 2023-12-08 ENCOUNTER — PATIENT MESSAGE (OUTPATIENT)
Dept: INTERNAL MEDICINE | Facility: CLINIC | Age: 73
End: 2023-12-08
Payer: MEDICARE

## 2023-12-28 ENCOUNTER — OFFICE VISIT (OUTPATIENT)
Dept: INTERNAL MEDICINE | Facility: CLINIC | Age: 73
End: 2023-12-28
Payer: MEDICARE

## 2023-12-28 VITALS
BODY MASS INDEX: 28.21 KG/M2 | TEMPERATURE: 98 F | SYSTOLIC BLOOD PRESSURE: 124 MMHG | OXYGEN SATURATION: 96 % | DIASTOLIC BLOOD PRESSURE: 68 MMHG | WEIGHT: 159.19 LBS | HEIGHT: 63 IN | HEART RATE: 46 BPM

## 2023-12-28 DIAGNOSIS — N18.31 CHRONIC KIDNEY DISEASE, STAGE 3A: ICD-10-CM

## 2023-12-28 DIAGNOSIS — I10 ESSENTIAL HYPERTENSION: Primary | ICD-10-CM

## 2023-12-28 DIAGNOSIS — F51.01 PRIMARY INSOMNIA: ICD-10-CM

## 2023-12-28 DIAGNOSIS — Z86.73 HISTORY OF LACUNAR CEREBROVASCULAR ACCIDENT (CVA): ICD-10-CM

## 2023-12-28 DIAGNOSIS — R00.1 BRADYCARDIA: ICD-10-CM

## 2023-12-28 PROCEDURE — 93005 EKG 12-LEAD: ICD-10-PCS | Mod: S$GLB,,,

## 2023-12-28 PROCEDURE — 99999 PR PBB SHADOW E&M-EST. PATIENT-LVL IV: ICD-10-PCS | Mod: PBBFAC,,,

## 2023-12-28 PROCEDURE — 93005 ELECTROCARDIOGRAM TRACING: CPT | Mod: PBBFAC | Performed by: INTERNAL MEDICINE

## 2023-12-28 PROCEDURE — 99999 PR PBB SHADOW E&M-EST. PATIENT-LVL IV: CPT | Mod: PBBFAC,,,

## 2023-12-28 PROCEDURE — 93010 EKG 12-LEAD: ICD-10-PCS | Mod: S$GLB,,, | Performed by: INTERNAL MEDICINE

## 2023-12-28 PROCEDURE — 99214 OFFICE O/P EST MOD 30 MIN: CPT | Mod: S$GLB,,,

## 2023-12-28 PROCEDURE — 93010 ELECTROCARDIOGRAM REPORT: CPT | Mod: S$GLB,,, | Performed by: INTERNAL MEDICINE

## 2023-12-28 PROCEDURE — 99214 PR OFFICE/OUTPT VISIT, EST, LEVL IV, 30-39 MIN: ICD-10-PCS | Mod: S$GLB,,,

## 2023-12-28 PROCEDURE — 93005 ELECTROCARDIOGRAM TRACING: CPT | Mod: S$GLB,,,

## 2023-12-28 RX ORDER — MIRTAZAPINE 15 MG/1
15 TABLET, FILM COATED ORAL NIGHTLY
Qty: 90 TABLET | Refills: 1 | Status: SHIPPED | OUTPATIENT
Start: 2023-12-28 | End: 2024-01-18

## 2023-12-28 RX ORDER — TRAZODONE HYDROCHLORIDE 50 MG/1
50 TABLET ORAL NIGHTLY
Qty: 90 TABLET | Refills: 0 | Status: SHIPPED | OUTPATIENT
Start: 2023-12-28 | End: 2023-12-28

## 2023-12-28 NOTE — PATIENT INSTRUCTIONS
Remeron sent to [facility]. Would like to see you back in 2-4 weeks to see how the medicine is working for you. At next visit, bring all Home medicine to the appointment     DO NOT TAKE THE METOPROLOL UNTIL YOU ARE SEEN AT THE NEXT APPOINTMENT

## 2023-12-28 NOTE — PROGRESS NOTES
Rosa Isela GOMEZ [place]  12/28/2023  [id number]    Sherrell Drummond MD  Patient Care Team:  Sherrell Drummond MD as PCP - General (Family Medicine)  Anahi Diana LPN as Care Coordinator  Jose M Porter as Digital Medicine Health   Marc Mackay, PharmD as Hypertension Digital Medicine Clinician  Marc Mackay PharmD as Hyperlipidemia Digital Medicine Clinician (Pharmacist)  Sherrell Drummond MD as Hypertension Digital Medicine Responsible Provider (Family Medicine)  Medicare, Luis Armando Melgoza Managed as Hypertension Digital Medicine Contract  Clarissa Arias MD as Hyperlipidemia Digital Medicine Responsible Provider (Family Medicine)          Visit Type:an urgent visit for a new problem    Chief Complaint:  Chief Complaint   Patient presents with    GI Problem    Insomnia        History of Present Illness:    Insomnia   Previously prescribed trazodone   Does not remember the medication     Having decreased appetite at times  She has been stressed at times     Prescribed Buspar 10 mg BID  Pt is not sure if she is taking it     History:  Past Medical History:   Diagnosis Date    Chronic kidney disease, stage 3a 04/20/2023    Depression     Hyperlipidemia     Hypertension     Obesity     Stroke     Tobacco dependence      Past Surgical History:   Procedure Laterality Date    CHOLECYSTECTOMY      removal    COLONOSCOPY N/A 11/1/2017    Procedure: COLONOSCOPY;  Surgeon: Francisco Javier Bai MD;  Location: [facility] ENDO;  Service: Endoscopy;  Laterality: N/A;    COLONOSCOPY N/A 6/26/2023    Procedure: COLONOSCOPY;  Surgeon: Vicki Krishna MD;  Location: [facility] ENDO;  Service: Endoscopy;  Laterality: N/A;    HYSTERECTOMY      MYOMECTOMY      removal    OOPHORECTOMY      TUBAL LIGATION       Family History   Problem Relation Age of Onset    Stroke Mother     Hypertension Mother     Hypertension Father     Heart disease Father     Cancer Sister     Diabetes Neg Hx      Social History     Socioeconomic History    Marital status:     Tobacco Use    Smoking status: Every Day     Current packs/day: 1.00     Average packs/day: 1 pack/day for 51.0 years (51.0 ttl pk-yrs)     Types: Cigarettes    Smokeless tobacco: Never   Substance and Sexual Activity    Alcohol use: Not Currently     Comment: occ    Drug use: No    Sexual activity: Not Currently     Partners: Male     Social Determinants of Health     Financial Resource Strain: Medium Risk (12/21/2023)    Overall Financial Resource Strain (CARDIA)     Difficulty of Paying Living Expenses: Somewhat hard   Food Insecurity: Food Insecurity Present (12/21/2023)    Hunger Vital Sign     Worried About Running Out of Food in the Last Year: Sometimes true     Ran Out of Food in the Last Year: Never true   Transportation Needs: No Transportation Needs (12/21/2023)    PRAPARE - Transportation     Lack of Transportation (Medical): No     Lack of Transportation (Non-Medical): No   Physical Activity: Inactive (12/21/2023)    Exercise Vital Sign     Days of Exercise per Week: 0 days     Minutes of Exercise per Session: 0 min   Stress: Stress Concern Present (12/21/2023)    [employer] [place] [employer] - Occupational Stress Questionnaire     Feeling of Stress : Very much   Social Connections: Moderately Isolated (12/21/2023)    Social Connection and Isolation Panel [NHANES]     Frequency of Communication with Friends and Family: More than three times a week     Frequency of Social Gatherings with Friends and Family: Twice a week     Attends Sabianism Services: Never     Active Member of Clubs or Organizations: No     Attends Club or Organization Meetings: Never     Marital Status:    Housing Stability: Low Risk  (12/21/2023)    Housing Stability Vital Sign     Unable to Pay for Housing in the Last Year: No     Number of Places Lived in the Last Year: 1     Unstable Housing in the Last Year: No     Patient Active Problem List   Diagnosis    Essential hypertension    Hyperlipidemia     Vasovagal syncope    Tobacco use disorder    Class 1 obesity due to excess calories in adult    Right sided weakness    Deficit in activities of daily living (ADL)    Cognitive communication disorder    Decreased activity tolerance    Decreased strength of lower extremity    Lacunar infarction    Major depressive disorder with single episode, in full remission    Chronic kidney disease, stage 3a     Review of patient's allergies indicates:   Allergen Reactions    Enalapril Other (See Comments)     Ace cough       The following were reviewed at this visit: active problem list, medication list, allergies, family history, social history, and health maintenance.    Medications:  Current Outpatient Medications on File Prior to Visit   Medication Sig Dispense Refill    amLODIPine (NORVASC) 5 MG tablet TAKE 1 TABLET BY MOUTH ONCE DAILY APPOINTMENT NEEDED  WITH DR. CORREA FOR  FUTURE REFILLS (Patient not taking: Reported on 10/26/2023) 90 tablet 3    aspirin 81 MG Chew Take 1 tablet (81 mg total) by mouth once daily. 90 tablet 3    atorvastatin (LIPITOR) 80 MG tablet Take 1 tablet (80 mg total) by mouth nightly. 90 tablet 3    busPIRone (BUSPAR) 5 MG Tab Take 2 tablets (10 mg total) by mouth 2 (two) times daily. 120 tablet 11    donepeziL (ARICEPT) 10 MG tablet Take 1 tablet (10 mg total) by mouth every evening. Take one half tablet for one week then as prescribed. 90 tablet 3    irbesartan-hydrochlorothiazide (AVALIDE) 150-12.5 mg per tablet Take 1 tablet by mouth once daily. 90 tablet 3    memantine (NAMENDA) 10 MG Tab Take 1 tablet (10 mg total) by mouth 2 (two) times daily. 60 tablet 11    metoprolol succinate (TOPROL-XL) 25 MG 24 hr tablet TAKE 1 TABLET BY MOUTH  DAILY 90 tablet 3    multivitamin with minerals tablet       nicotine (NICODERM CQ) 21 mg/24 hr Place 1 patch onto the skin once daily. 28 patch 0     No current facility-administered medications on file prior to visit.       Medications have been reviewed  and reconciled with patient at this visit.  Barriers to medications reviewed with patient.    Adverse reactions to current medications reviewed with patient..    Over the counter medications reviewed and reconciled with patient.    Exam:  Wt Readings from Last 3 Encounters:   10/26/23 72.4 kg (159 lb 9.8 oz)   10/23/23 73.3 kg (161 lb 9.6 oz)   07/07/23 77.1 kg (169 lb 15.6 oz)     Temp Readings from Last 3 Encounters:   10/23/23 96 °F (35.6 °C) (Tympanic)   06/26/23 98.6 °F (37 °C) (Temporal)   04/26/23 96.7 °F (35.9 °C) (Tympanic)     BP Readings from Last 3 Encounters:   10/26/23 (!) 142/71   10/23/23 134/70   06/26/23 (!) 147/59     Pulse Readings from Last 3 Encounters:   10/26/23 (!) 48   10/23/23 64   06/26/23 62     There is no height or weight on file to calculate BMI.      Review of Systems   Constitutional:  Positive for malaise/fatigue. Negative for fever and weight loss.   Gastrointestinal:  Positive for abdominal pain and diarrhea. Negative for constipation, melena and vomiting.   Genitourinary:  Negative for dysuria, frequency and hematuria.   Musculoskeletal:  Negative for myalgias.   Neurological:  Negative for headaches.   Psychiatric/Behavioral:  Positive for memory loss. The patient has insomnia.      Answers submitted by the patient for this visit:  Abdominal Pain Questionnaire (Submitted on 12/22/2023)  Chief Complaint: Abdominal pain  Chronicity: new  Onset: more than 1 month ago  Onset quality: sudden  Frequency: every several days  Episode duration: 4 Hours  Progression since onset: gradually worsening  Pain location: generalized abdominal region  Pain - numeric: 5/10  Pain quality: cramping  anorexia: Yes  arthralgias: No  belching: No  flatus: Yes  hematochezia: No  Aggravated by: nothing  Relieved by: bowel movements  Pain severity: moderate  gallstones: Yes      Physical Exam  Nursing note reviewed.   Cardiovascular:      Rate and Rhythm: Regular rhythm. Bradycardia present.   Pulmonary:       Effort: Pulmonary effort is normal. No respiratory distress.      Breath sounds: Normal breath sounds.   Neurological:      Mental Status: She is alert. She is disoriented.         Laboratory Reviewed ({Yes)  Lab Results   Component Value Date    WBC 7.29 04/20/2023    HGB 12.5 04/20/2023    HCT 40.1 04/20/2023     04/20/2023    CHOL 146 04/20/2023    TRIG 123 04/20/2023    HDL 46 04/20/2023    ALT 23 04/20/2023    AST 17 04/20/2023     10/23/2023    K 3.5 10/23/2023     10/23/2023    CREATININE 0.8 10/23/2023    BUN 19 10/23/2023    CO2 26 10/23/2023    TSH 1.511 10/26/2023    INR 1.0 10/27/2022    HGBA1C 5.4 04/20/2023       Rosa Isela was seen today for gi problem and insomnia.    Diagnoses and all orders for this visit:    Essential hypertension  At visit, Blood pressure is at goal. Continue current medications      Chronic kidney disease, stage 3a  Will continue to monitor and avoid/limit renal toxic agents      Primary insomnia  -     Discontinue: traZODone (DESYREL) 50 MG tablet; Take 1 tablet (50 mg total) by mouth every evening.  -     mirtazapine (REMERON) 15 MG tablet; Take 1 tablet (15 mg total) by mouth every evening.    History of lacunar cerebrovascular accident (CVA)    Bradycardia  -     IN OFFICE EKG 12-LEAD (to Muse)    Will hold the Metoprolol for now   HR 48  Start on Remeron for sleep and to help with appetite     Will schedule a medicine check in 4-6 weeks        Care Plan/Goals: Reviewed    Goals         Blood Pressure < 140/90 (pt-stated)       Exercise at least 150 minutes per week.       Take at least one BP reading per week at various times of the day             Follow up: No follow-ups on file.    After visit summary was printed and given to patient upon discharge today.  Patient goals and care plan are included in After Visit Summary.

## 2024-01-03 ENCOUNTER — PATIENT MESSAGE (OUTPATIENT)
Dept: INTERNAL MEDICINE | Facility: CLINIC | Age: 74
End: 2024-01-03
Payer: MEDICARE

## 2024-01-18 ENCOUNTER — OFFICE VISIT (OUTPATIENT)
Dept: INTERNAL MEDICINE | Facility: CLINIC | Age: 74
End: 2024-01-18
Payer: MEDICARE

## 2024-01-18 VITALS
BODY MASS INDEX: 28.39 KG/M2 | HEART RATE: 68 BPM | WEIGHT: 160.25 LBS | DIASTOLIC BLOOD PRESSURE: 70 MMHG | TEMPERATURE: 98 F | SYSTOLIC BLOOD PRESSURE: 132 MMHG | OXYGEN SATURATION: 96 % | HEIGHT: 63 IN

## 2024-01-18 DIAGNOSIS — R00.1 BRADYCARDIA: ICD-10-CM

## 2024-01-18 DIAGNOSIS — N18.31 CHRONIC KIDNEY DISEASE, STAGE 3A: ICD-10-CM

## 2024-01-18 DIAGNOSIS — I10 ESSENTIAL HYPERTENSION: Primary | ICD-10-CM

## 2024-01-18 DIAGNOSIS — R41.841 COGNITIVE COMMUNICATION DISORDER: ICD-10-CM

## 2024-01-18 DIAGNOSIS — F51.01 PRIMARY INSOMNIA: ICD-10-CM

## 2024-01-18 PROCEDURE — 99214 OFFICE O/P EST MOD 30 MIN: CPT | Mod: S$GLB,,,

## 2024-01-18 PROCEDURE — 99999 PR PBB SHADOW E&M-EST. PATIENT-LVL IV: CPT | Mod: PBBFAC,,,

## 2024-01-18 NOTE — PROGRESS NOTES
"Rosa Isela Pate  01/18/2024  [id number]    Sherrell Drummond MD  Patient Care Team:  Sherrell Drummond MD as PCP - General (Family Medicine)  Anahi Diana LPN as Care Coordinator  Jose M Porter as Digital Medicine Health   Marc Mackay, PharmSHANT as Hypertension Digital Medicine Clinician  Marc Mackay PharmSHANT as Hyperlipidemia Digital Medicine Clinician (Pharmacist)  Sherrell Drummond MD as Hypertension Digital Medicine Responsible Provider (Family Medicine)  Clarissa Arias MD as Hyperlipidemia Digital Medicine Responsible Provider (Family Medicine)          Visit Type:a scheduled routine follow-up visit    Chief Complaint:  Chief Complaint   Patient presents with    Follow-up        History of Present Illness:    Pt was seen last month   Started on Remeron for insomnia and decreased appetite   Felt sluggish while taking the medication  Recommended stopping the medication     HR was 48 at last visit  Informed pt to hold metoprolol       At visit, asked if she had been holding metoprolol  "Pt states not sure"    She has not been sleeping at night   She is usually up watching TV At night     Negative for dizziness       History:  Past Medical History:   Diagnosis Date    Chronic kidney disease, stage 3a 04/20/2023    Depression     Hyperlipidemia     Hypertension     Obesity     Stroke     Tobacco dependence      Past Surgical History:   Procedure Laterality Date    CHOLECYSTECTOMY      removal    COLONOSCOPY N/A 11/1/2017    Procedure: COLONOSCOPY;  Surgeon: Francisco Javier Bai MD;  Location: [facility] ENDO;  Service: Endoscopy;  Laterality: N/A;    COLONOSCOPY N/A 6/26/2023    Procedure: COLONOSCOPY;  Surgeon: Vicki Krishna MD;  Location: [facility] ENDO;  Service: Endoscopy;  Laterality: N/A;    HYSTERECTOMY      MYOMECTOMY      removal    OOPHORECTOMY      TUBAL LIGATION       Family History   Problem Relation Age of Onset    Stroke Mother     Hypertension Mother     Hypertension Father     Heart disease " Father     Cancer Sister     Diabetes Neg Hx      Social History     Socioeconomic History    Marital status:    Tobacco Use    Smoking status: Every Day     Current packs/day: 1.00     Average packs/day: 1 pack/day for 51.0 years (51.0 ttl pk-yrs)     Types: Cigarettes    Smokeless tobacco: Never   Substance and Sexual Activity    Alcohol use: Not Currently     Comment: occ    Drug use: No    Sexual activity: Not Currently     Partners: Male     Social Determinants of Health     Financial Resource Strain: Medium Risk (12/21/2023)    Overall Financial Resource Strain (CARDIA)     Difficulty of Paying Living Expenses: Somewhat hard   Food Insecurity: Food Insecurity Present (12/21/2023)    Hunger Vital Sign     Worried About Running Out of Food in the Last Year: Sometimes true     Ran Out of Food in the Last Year: Never true   Transportation Needs: No Transportation Needs (12/21/2023)    PRAPARE - Transportation     Lack of Transportation (Medical): No     Lack of Transportation (Non-Medical): No   Physical Activity: Inactive (12/21/2023)    Exercise Vital Sign     Days of Exercise per Week: 0 days     Minutes of Exercise per Session: 0 min   Stress: Stress Concern Present (12/21/2023)    [employer] [place] [employer] - Occupational Stress Questionnaire     Feeling of Stress : Very much   Social Connections: Moderately Isolated (12/21/2023)    Social Connection and Isolation Panel [NHANES]     Frequency of Communication with Friends and Family: More than three times a week     Frequency of Social Gatherings with Friends and Family: Twice a week     Attends Zoroastrian Services: Never     Active Member of Clubs or Organizations: No     Attends Club or Organization Meetings: Never     Marital Status:    Housing Stability: Low Risk  (12/21/2023)    Housing Stability Vital Sign     Unable to Pay for Housing in the Last Year: No     Number of Places Lived in the Last Year: 1     Unstable Housing in  the Last Year: No     Patient Active Problem List   Diagnosis    Essential hypertension    Hyperlipidemia    Vasovagal syncope    Tobacco use disorder    Class 1 obesity due to excess calories in adult    Right sided weakness    Deficit in activities of daily living (ADL)    Cognitive communication disorder    Decreased activity tolerance    Decreased strength of lower extremity    Lacunar infarction    Major depressive disorder with single episode, in full remission    Chronic kidney disease, stage 3a     Review of patient's allergies indicates:   Allergen Reactions    Enalapril Other (See Comments)     Ace cough       The following were reviewed at this visit: active problem list, medication list, allergies, family history, social history, and health maintenance.    Medications:  Current Outpatient Medications on File Prior to Visit   Medication Sig Dispense Refill    amLODIPine (NORVASC) 5 MG tablet TAKE 1 TABLET BY MOUTH ONCE DAILY APPOINTMENT NEEDED  WITH DR. CORREA FOR  FUTURE REFILLS 90 tablet 3    aspirin 81 MG Chew Take 1 tablet (81 mg total) by mouth once daily. 90 tablet 3    atorvastatin (LIPITOR) 80 MG tablet Take 1 tablet (80 mg total) by mouth nightly. 90 tablet 3    busPIRone (BUSPAR) 5 MG Tab Take 2 tablets (10 mg total) by mouth 2 (two) times daily. 120 tablet 11    donepeziL (ARICEPT) 10 MG tablet Take 1 tablet (10 mg total) by mouth every evening. Take one half tablet for one week then as prescribed. 90 tablet 3    irbesartan-hydrochlorothiazide (AVALIDE) 150-12.5 mg per tablet Take 1 tablet by mouth once daily. 90 tablet 3    memantine (NAMENDA) 10 MG Tab Take 1 tablet (10 mg total) by mouth 2 (two) times daily. 60 tablet 11    mirtazapine (REMERON) 15 MG tablet Take 1 tablet (15 mg total) by mouth every evening. 90 tablet 1    multivitamin with minerals tablet        No current facility-administered medications on file prior to visit.       Medications have been reviewed and reconciled with  patient at this visit.  Barriers to medications reviewed with patient.    Adverse reactions to current medications reviewed with patient..    Over the counter medications reviewed and reconciled with patient.    Exam:  Wt Readings from Last 3 Encounters:   12/28/23 72.2 kg (159 lb 2.8 oz)   10/26/23 72.4 kg (159 lb 9.8 oz)   10/23/23 73.3 kg (161 lb 9.6 oz)     Temp Readings from Last 3 Encounters:   12/28/23 98.1 °F (36.7 °C) (Tympanic)   10/23/23 96 °F (35.6 °C) (Tympanic)   06/26/23 98.6 °F (37 °C) (Temporal)     BP Readings from Last 3 Encounters:   12/28/23 124/68   10/26/23 (!) 142/71   10/23/23 134/70     Pulse Readings from Last 3 Encounters:   12/28/23 (!) 46   10/26/23 (!) 48   10/23/23 64     There is no height or weight on file to calculate BMI.      Review of Systems   Cardiovascular:  Negative for chest pain and palpitations.   Psychiatric/Behavioral:  Positive for memory loss. The patient has insomnia.      Physical Exam  Vitals and nursing note reviewed.   Constitutional:       General: She is not in acute distress.     Appearance: She is well-developed. She is not diaphoretic.   HENT:      Head: Normocephalic and atraumatic.      Right Ear: External ear normal.      Left Ear: External ear normal.   Eyes:      General:         Right eye: No discharge.         Left eye: No discharge.      Conjunctiva/sclera: Conjunctivae normal.      Pupils: Pupils are equal, round, and reactive to light.   Cardiovascular:      Rate and Rhythm: Normal rate and regular rhythm.      Heart sounds: Normal heart sounds. No murmur heard.  Pulmonary:      Effort: Pulmonary effort is normal. No respiratory distress.      Breath sounds: Normal breath sounds. No wheezing.   Neurological:      Mental Status: She is alert.         Laboratory Reviewed ({Yes)  Lab Results   Component Value Date    WBC 7.29 04/20/2023    HGB 12.5 04/20/2023    HCT 40.1 04/20/2023     04/20/2023    CHOL 146 04/20/2023    TRIG 123 04/20/2023     HDL 46 04/20/2023    ALT 23 04/20/2023    AST 17 04/20/2023     10/23/2023    K 3.5 10/23/2023     10/23/2023    CREATININE 0.8 10/23/2023    BUN 19 10/23/2023    CO2 26 10/23/2023    TSH 1.511 10/26/2023    INR 1.0 10/27/2022    HGBA1C 5.4 04/20/2023     Rosa Isela was seen today for follow-up.    Diagnoses and all orders for this visit:    Essential hypertension  At visit, Blood pressure is at goal. Continue current medications      Chronic kidney disease, stage 3a  Will continue to monitor and avoid/limit renal toxic agents      Primary insomnia    Bradycardia  Has resolved  Cont to hold Metoprolol     Cognitive communication disorder  Pt has an appt with Neuro next week         At visit, pt states that she is not interested in trying a different sleep medication  Pt states that some nights she sleep fine and other times she stays up   At this time, she does not want to try any other medicine for sleeping     If interested in a different medication for sleep, can try OTC melatonin       Care Plan/Goals: Reviewed    Goals         Blood Pressure < 140/90 (pt-stated)       Exercise at least 150 minutes per week.       Take at least one BP reading per week at various times of the day             Follow up: No follow-ups on file.    After visit summary was printed and given to patient upon discharge today.  Patient goals and care plan are included in After Visit Summary.

## 2024-01-24 ENCOUNTER — OFFICE VISIT (OUTPATIENT)
Dept: NEUROLOGY | Facility: CLINIC | Age: 74
End: 2024-01-24
Payer: MEDICARE

## 2024-01-24 ENCOUNTER — TELEPHONE (OUTPATIENT)
Dept: NEUROLOGY | Facility: CLINIC | Age: 74
End: 2024-01-24
Payer: MEDICARE

## 2024-01-24 DIAGNOSIS — R41.3 OTHER AMNESIA: ICD-10-CM

## 2024-01-24 DIAGNOSIS — F41.9 ANXIETY: ICD-10-CM

## 2024-01-24 DIAGNOSIS — R53.1 RIGHT SIDED WEAKNESS: ICD-10-CM

## 2024-01-24 DIAGNOSIS — F32.5 MAJOR DEPRESSIVE DISORDER WITH SINGLE EPISODE, IN FULL REMISSION: ICD-10-CM

## 2024-01-24 DIAGNOSIS — G31.84 MCI (MILD COGNITIVE IMPAIRMENT) WITH MEMORY LOSS: ICD-10-CM

## 2024-01-24 DIAGNOSIS — R45.1 AGITATION: ICD-10-CM

## 2024-01-24 DIAGNOSIS — F17.200 TOBACCO USE DISORDER: ICD-10-CM

## 2024-01-24 DIAGNOSIS — I63.81 LACUNAR INFARCTION: ICD-10-CM

## 2024-01-24 DIAGNOSIS — F02.811 MAJOR NEUROCOGNITIVE DISORDER DUE TO ANOTHER MEDICAL CONDITION, WITH AGITATION: Primary | ICD-10-CM

## 2024-01-24 DIAGNOSIS — Z78.9 DEFICIT IN ACTIVITIES OF DAILY LIVING (ADL): ICD-10-CM

## 2024-01-24 DIAGNOSIS — R41.841 COGNITIVE COMMUNICATION DISORDER: ICD-10-CM

## 2024-01-24 PROCEDURE — 99214 OFFICE O/P EST MOD 30 MIN: CPT | Mod: 95,,, | Performed by: NURSE PRACTITIONER

## 2024-01-24 RX ORDER — LAMOTRIGINE 25 MG/1
50 TABLET ORAL 2 TIMES DAILY
Qty: 120 TABLET | Refills: 5 | Status: SHIPPED | OUTPATIENT
Start: 2024-01-24 | End: 2024-07-22

## 2024-01-24 NOTE — TELEPHONE ENCOUNTER
Called patient and her  in regards to explaining the process in completing a virtual visit. Patient was able to log into the fauzia and pull up the camera and audio to make sure it is working properly. Patient and  both verbalized understanding and confirmed they know what to do in upcoming appt.

## 2024-01-24 NOTE — PROGRESS NOTES
Subjective:       Patient ID: Rosa Isela Pate is a 73 y.o. female.    Chief Complaint: Major neurocognitive disorder due to another medical condit      Referred by: Dr. Drummond PCP     HPI The patient is here for memory loss.     The patient is presenting with memory changes that began in  2020 after a stroke per spouse. The patient states she was at work and she couldn't recall her password and work related things to do, so her boss instructed her to go to the doctor and she states she was told she had a stroke. She was started on ASA. Stroke risk factors HLD, HTN, Heavy Smoker. The main problems the patient has are related to progressive short term memory loss. For example, the patient would repeat the same question repeatedly. The patient excessively forgets where placed certain things such as keys, unable to recall how much money she has spent, forget things told to her. The patient also started forgetting names such as grand kids names. The patient is driving, she admits to getting lost going to familiar places. No confusion around and inside the house. Has trouble remembering the date and time, keeping up with medications and appointments and patient is able to recall major holidays and unaware of political changes reports that current president is [employer], (However it is President [facility] 2023) . The patient sister is handling finances. The patient is still independent with ADLs. Patient has some agitation, and no aggression. No hallucinations or delusions. No seizures. No language problems. No problems handling tools. No history of headaches. No history of hypothyroidism. No history of alcoholism. Patient is a heavy smoker 10 or more per day, No history of B12 deficiency. Chronic depression. Reports that she is not happy with living situation. She has marital conflict, patient states that her relationship is not what it use to be. No history of bipolar disorder. No history of Untreated Syphilis.  No history of  HIV infection. No toxic exposures.  No history of traumatic brain injury. No tremors or abnormal movements. No falls or instability. Urgency urinary incontinence. Difficulty with sleep and good appetite. No family history of dementia.       INTERVAL HISTORY 01-: Patient present for memory management. Accompanied by spouse. Patient doing well currently tolerating Namenda 10 mg BID and Donepezil/Aricept 10 mg QHS no side effects. No cognitive declined noted, no recent falls, no syncope noted.  Spouse reports worsening agitation. He reports anxiety has lessened. Tolerating Buspar 10 mg po BID. She will follow up with Psychiatry Feb. 13, 2024. Recommend starting LTG 25 mg po BID for agitation. Patient and spouse agree to plan of care. CNP testing not completed.     The originating site (patient location) is: Home.        The distant site (neurologist location) is: Neurology Clinic at [facility].        The chief complaint leading to consultation is: F/U memory management          Visit type: Virtual visit with synchronous audio and video.        Total time spent with patient: 20 minutes         Special circumstances: This visit occurred during COVID-19 Pandemic Public Health Emergency.         Consent: The patient verbally consented to participating in the video visit and informed that may decline to receive medical services by telemedicine and may withdraw from such care at any time.        I discussed with the patient the nature of our telemedicine visits, that:        I  would evaluate the patient and recommend diagnostics and treatments based on my assessment.     Our sessions are not being recorded and that personal health information is protected.     Our team would provide follow up care in person if/when the patient needs it.     Virtual (video/telemedicine) visits have significant limitations. A telemedicine exam is primarily focused on the history and what I can observe. Several critical parts  of the neurological exam cannot be performed.        Review of Systems   Unable to perform ROS: Dementia   Constitutional: Negative.    HENT: Negative.     Eyes: Negative.    Respiratory: Negative.     Cardiovascular: Negative.    Gastrointestinal: Negative.    Endocrine: Negative.    Genitourinary: Negative.    Musculoskeletal:  Positive for arthralgias.   Skin: Negative.    Allergic/Immunologic: Negative.    Neurological: Negative.    Hematological: Negative.    Psychiatric/Behavioral:  Positive for agitation, confusion, decreased concentration, dysphoric mood and sleep disturbance.                  Current Outpatient Medications:     amLODIPine (NORVASC) 5 MG tablet, TAKE 1 TABLET BY MOUTH ONCE DAILY APPOINTMENT NEEDED  WITH DR. CORREA FOR  FUTURE REFILLS, Disp: 90 tablet, Rfl: 3    aspirin 81 MG Chew, Take 1 tablet (81 mg total) by mouth once daily., Disp: 90 tablet, Rfl: 3    atorvastatin (LIPITOR) 80 MG tablet, Take 1 tablet (80 mg total) by mouth nightly., Disp: 90 tablet, Rfl: 3    busPIRone (BUSPAR) 5 MG Tab, Take 2 tablets (10 mg total) by mouth 2 (two) times daily., Disp: 120 tablet, Rfl: 11    donepeziL (ARICEPT) 10 MG tablet, Take 1 tablet (10 mg total) by mouth every evening. Take one half tablet for one week then as prescribed., Disp: 90 tablet, Rfl: 3    irbesartan-hydrochlorothiazide (AVALIDE) 150-12.5 mg per tablet, Take 1 tablet by mouth once daily., Disp: 90 tablet, Rfl: 3    lamoTRIgine (LAMICTAL) 25 MG tablet, Take 2 tablets (50 mg total) by mouth 2 (two) times daily., Disp: 120 tablet, Rfl: 5    memantine (NAMENDA) 10 MG Tab, Take 1 tablet (10 mg total) by mouth 2 (two) times daily., Disp: 60 tablet, Rfl: 11    multivitamin with minerals tablet, , Disp: , Rfl:   Past Medical History:   Diagnosis Date    Chronic kidney disease, stage 3a 04/20/2023    Depression     Hyperlipidemia     Hypertension     Obesity     Stroke     Tobacco dependence      Past Surgical History:   Procedure Laterality  Date    CHOLECYSTECTOMY      removal    COLONOSCOPY N/A 11/1/2017    Procedure: COLONOSCOPY;  Surgeon: Francisco Javier Bai MD;  Location: [facility] ENDO;  Service: Endoscopy;  Laterality: N/A;    COLONOSCOPY N/A 6/26/2023    Procedure: COLONOSCOPY;  Surgeon: Vicki Krishna MD;  Location: [facility] ENDO;  Service: Endoscopy;  Laterality: N/A;    HYSTERECTOMY      MYOMECTOMY      removal    OOPHORECTOMY      TUBAL LIGATION       Social History     Socioeconomic History    Marital status:    Tobacco Use    Smoking status: Every Day     Current packs/day: 1.00     Average packs/day: 1 pack/day for 51.0 years (51.0 ttl pk-yrs)     Types: Cigarettes    Smokeless tobacco: Never   Substance and Sexual Activity    Alcohol use: Not Currently     Comment: occ    Drug use: No    Sexual activity: Not Currently     Partners: Male     Social Determinants of Health     Financial Resource Strain: Medium Risk (12/21/2023)    Overall Financial Resource Strain (CARDIA)     Difficulty of Paying Living Expenses: Somewhat hard   Food Insecurity: Food Insecurity Present (12/21/2023)    Hunger Vital Sign     Worried About Running Out of Food in the Last Year: Sometimes true     Ran Out of Food in the Last Year: Never true   Transportation Needs: No Transportation Needs (12/21/2023)    PRAPARE - Transportation     Lack of Transportation (Medical): No     Lack of Transportation (Non-Medical): No   Physical Activity: Inactive (12/21/2023)    Exercise Vital Sign     Days of Exercise per Week: 0 days     Minutes of Exercise per Session: 0 min   Stress: Stress Concern Present (12/21/2023)    [employer] - Occupational Stress Questionnaire     Feeling of Stress : Very much   Social Connections: Moderately Isolated (12/21/2023)    Social Connection and Isolation Panel [NHANES]     Frequency of Communication with Friends and Family: More than three times a week     Frequency of Social Gatherings with Friends and Family:  Twice a week     Attends Zoroastrianism Services: Never     Active Member of Clubs or Organizations: No     Attends Club or Organization Meetings: Never     Marital Status:    Housing Stability: Low Risk  (12/21/2023)    Housing Stability Vital Sign     Unable to Pay for Housing in the Last Year: No     Number of Places Lived in the Last Year: 1     Unstable Housing in the Last Year: No             Past/Current Medical/Surgical History, Past/Current Social History, Past/Current Family History and Past/Current Medications were reviewed in detail.        Objective:           VITAL SIGNS WERE REVIEWED      GENERAL APPEARANCE:     The patient looks comfortable.    Body habitus is normal BMI 28.27 KG     No signs of respiratory distress.    Normal breathing pattern.    No dysmorphic features    Normal eye contact.     GENERAL MEDICAL EXAM:    HEENT:  Head is atraumatic normocephalic.     No tender temporal arteries. Fundoscopic (Ophthalmoscopic) exam showed no disc edema.      Neck and Axillae: No JVD. No visible lesions.    No carotid bruits. No thyromegaly. No lymphadenopathy.    Cardiopulmonary: No cyanosis. No tachypnea. Normal respiratory effort.    Gastrointestinal/Urogenital:  No jaundice. No stomas or lesions. No visible hernias. No catheters.     Abdomen is soft non-tender. No masses or organomegaly.    Skin, Hair and Nails: No pathognonomic skin rash. No neurofibromatosis. No visible lesions.No stigmata of autoimmune disease. No clubbing.    Skin is warm and moist. No palpable masses.    Limbs: No varicose veins. No visible swelling.    No palpable edema. Pulses are symmetric. Pedal pulses are palpable.      Muskoskeletal: No visible deformities.No visible lesions.    No spine tenderness. No signs of longstanding neuropathy. No dislocations or fractures.            Neurologic Exam     Mental Status   Oriented to person.   Oriented to place. Oriented to country.   Disoriented to year, month and date.   Follows  2 step commands.   Attention: decreased. Concentration: decreased.   Speech: speech is normal and not slurred   Level of consciousness: alert  Knowledge: inconsistent with education and poor. Able to perform simple calculations.   Unable to name object. Able to read. Able to repeat. Able to write. Abnormal comprehension.     Cranial Nerves     CN II   Visual fields full to confrontation.   Visual acuity: decreased  Right visual field deficit: none  Left visual field deficit: none     CN III, IV, VI   Pupils are equal, round, and reactive to light.  Extraocular motions are normal.   Right pupil: Size: 2 mm. Shape: regular. Reactivity: brisk. Consensual response: intact. Accommodation: intact.   Left pupil: Size: 2 mm. Shape: regular. Reactivity: brisk. Consensual response: intact. Accommodation: intact.   CN III: no CN III palsy  CN VI: no CN VI palsy  Nystagmus: none   Diplopia: none  Ophthalmoparesis: none  Upgaze: normal  Downgaze: normal  Conjugate gaze: present  Vestibulo-ocular reflex: present    CN V   Facial sensation intact.   Right facial sensation deficit: none  Left facial sensation deficit: none    CN VII   Right facial weakness: none  Left facial weakness: none    CN VIII   CN VIII normal.   Hearing: intact    CN IX, X   CN IX normal.   CN X normal.   Palate: symmetric    CN XI   CN XI normal.   Right sternocleidomastoid strength: normal  Left sternocleidomastoid strength: normal  Right trapezius strength: normal  Left trapezius strength: normal    CN XII   CN XII normal.   Tongue: not atrophic  Fasciculations: absent  Tongue deviation: none    Motor Exam   Muscle bulk: normal  Overall muscle tone: normal  Right arm tone: normal  Left arm tone: normal  Right arm pronator drift: absent  Left arm pronator drift: absent  Right leg tone: normal  Left leg tone: normal    Strength   Strength 5/5 throughout.   Right neck flexion: 5/5  Left neck flexion: 5/5  Right neck extension: 5/5  Left neck extension:  5/5  Right deltoid: 5/5  Left deltoid: 5/5  Right biceps: 5/5  Left biceps: 5/5  Right triceps: 5/5  Left triceps: 5/5  Right wrist flexion: 5/5  Left wrist flexion: 5/5  Right wrist extension: 5/5  Left wrist extension: 5/5  Right interossei: 5/5  Left interossei: 5/5  Right iliopsoas: 5/5  Left iliopsoas: 5/  Right quadriceps: 5/5  Left quadriceps: 5/5  Right hamstrin/  Left hamstrin/  Right glutei: 5/  Left glutei: 5/  Right anterior tibial: 5/5  Left anterior tibial: 5  Right posterior tibial:   Left posterior tibial:   Right peroneal:   Left peroneal:   Right gastroc: 5  Left gastroc:     Sensory Exam   Light touch normal.   Right arm light touch: normal  Left arm light touch: normal  Right leg light touch: normal  Left leg light touch: normal  Vibration normal.   Right arm vibration: normal  Left arm vibration: normal  Right leg vibration: normal  Left leg vibration: normal  Proprioception normal.   Right arm proprioception: normal  Left arm proprioception: normal  Right leg proprioception: normal  Left leg proprioception: normal  Pinprick normal.   Right arm pinprick: normal  Left arm pinprick: normal  Right leg pinprick: normal  Left leg pinprick: normal  Sensory deficit distribution on left: lateral cutaneous thigh  Graphesthesia: normal  Stereognosis: normal    Gait, Coordination, and Reflexes     Gait  Gait: normal    Coordination   Romberg: negative  Finger to nose coordination: normal  Heel to shin coordination: normal  Tandem walking coordination: normal    Tremor   Resting tremor: absent  Intention tremor: absent  Action tremor: absent    Reflexes   Right brachioradialis: 2+  Left brachioradialis: 2+  Right biceps: 2+  Left biceps: 2+  Right triceps: 2+  Left triceps: 2+  Right patellar: 2+  Left patellar: 2+  Right achilles: 1+  Left achilles: 1+  Right plantar: normal  Left plantar: normal  Right Blum: absent  Left Blum: absent  Right ankle clonus: absent  Left  ankle clonus: absent  Right pendular knee jerk: absent  Left pendular knee jerk: absent            JOSÉ LUIS COGNITIVE ASSESSMENT (MOCA) TOTAL SCORE      MILD DEMENTIA 20-25    Education Some College       DATE 10-       TOTAL SCORE 23/30       VISUOSPATIAL EXECUTIVE (5) 4       NAMING (3) 3       ATTENTION (6) 6       LANGUAGE (3) 2       ABSTRACTION(2) 2       RECALL (5) 0       ORIENTATION (6) 6           Lab Results   Component Value Date    WBC 7.29 04/20/2023    HGB 12.5 04/20/2023    HCT 40.1 04/20/2023    MCV 96 04/20/2023     04/20/2023     Sodium   Date Value Ref Range Status   10/23/2023 141 136 - 145 mmol/L Final     Potassium   Date Value Ref Range Status   10/23/2023 3.5 3.5 - 5.1 mmol/L Final     Chloride   Date Value Ref Range Status   10/23/2023 104 95 - 110 mmol/L Final     CO2   Date Value Ref Range Status   10/23/2023 26 23 - 29 mmol/L Final     Glucose   Date Value Ref Range Status   10/23/2023 83 70 - 110 mg/dL Final     BUN   Date Value Ref Range Status   10/23/2023 19 8 - 23 mg/dL Final     Creatinine   Date Value Ref Range Status   10/23/2023 0.8 0.5 - 1.4 mg/dL Final     Calcium   Date Value Ref Range Status   10/23/2023 9.9 8.7 - 10.5 mg/dL Final     Total Protein   Date Value Ref Range Status   04/20/2023 7.1 6.0 - 8.4 g/dL Final     Albumin   Date Value Ref Range Status   04/20/2023 3.9 3.5 - 5.2 g/dL Final     Total Bilirubin   Date Value Ref Range Status   04/20/2023 0.7 0.1 - 1.0 mg/dL Final     Comment:     For infants and newborns, interpretation of results should be based  on gestational age, weight and in agreement with clinical  observations.    Premature Infant recommended reference ranges:  Up to 24 hours.............<8.0 mg/dL  Up to 48 hours............<12.0 mg/dL  3-5 days..................<15.0 mg/dL  6-29 days.................<15.0 mg/dL       Alkaline Phosphatase   Date Value Ref Range Status   04/20/2023 65 55 - 135 U/L Final     AST   Date Value Ref Range  Status   04/20/2023 17 10 - 40 U/L Final     ALT   Date Value Ref Range Status   04/20/2023 23 10 - 44 U/L Final     Anion Gap   Date Value Ref Range Status   10/23/2023 11 8 - 16 mmol/L Final     eGFR if    Date Value Ref Range Status   05/30/2022 58 (A) >60 mL/min/1.73 m^2 Final     eGFR if non    Date Value Ref Range Status   05/30/2022 51 (A) >60 mL/min/1.73 m^2 Final     Comment:     Calculation used to obtain the estimated glomerular filtration  rate (eGFR) is the CKD-EPI equation.        Lab Results   Component Value Date    [id number] 636 10/26/2023     Lab Results   Component Value Date    TSH 1.511 10/26/2023     10-    UA neg for UTI, trace protein noted recommend increase hydration    DIEGO +, DIEGO Homogenous, DIEGO Titer 1:80, RPR Neg, FA level 17.7, Vitamin B 12 636 NL, HIV neg, HC level 10.1 NL, TSH level 1.511 NL,     Labs WNL except DIEGO+ recommend follow up with Rheumatology for further evaluation     04-    MRI BRAIN WO    Examination suggestive of prior small left thalamus lacunar infarction.       12-    MRI Brain WO    There is age-appropriate atrophy. There are multiple FLAIR hyperintensities throughout the periventricular and subcortical white matter bilaterally.     There is a focus of restricted diffusion in the anterior superior left thalamus measuring approximately 1.6 cm with slight ADC hypointensity consistent with a subacute infarct.     There is no intracranial hemorrhage. There is no midline shift or hydrocephalus.     Normal T2 flow voids are present at the skull base. The vertebral basilar system is diminutive. The visualized paranasal sinuses are well aerated. The orbital soft tissues are normal.    No acute intracranial process seen.      12-    MRA Brain     MRA Brain NL       MRI Brain WO:    11-    No acute intracranial abnormality.     Interval appearance of a tiny, remote left centrum semiovale infarct as compared  to the prior MRI 2021.     Unchanged small remote infarcts of the left thalamus, right cerebellum and vermis.     Minor chronic microvascular ischemic changes.    Reviewed the neuroimaging independently       Assessment:       1. 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 Anxiety    3. Agitation    4. Cognitive communication disorder    5. Lacunar infarction    6. Major depressive disorder with single episode, in full remission    7. Right sided weakness    8. Other amnesia    9. MCI (mild cognitive impairment) with memory loss    10. Tobacco use disorder    11. Deficit in activities of daily living (ADL)            Modified Zuleika Score (m-RS)      0  The patient has no residual symptoms.    1  The patient has no significant disability; able to carry out all pre-stroke activities.    2  The patient has slight disability; unable to carry out all pre-stroke activities but able to look after self without daily help.    3  The patient has moderate disability; requiring some external help but able to walk without the assistance of another individual.    4  The patient has moderately severe disability; unable to walk or attend to bodily functions without assistance of another individual.    5  The patient has severe disability; bedridden, incontinent, requires continuous care.    6  The patient has  (during the hospital stay or after discharge from the hospital).    Plan:         MAJOR MILD NEUROCOGNITIVE DISORDER DUE POSSIBLE VASCULAR DEMENTIA OR DUE TO ANOTHER CONDITION WITH AGITATION/ HX OF STROKE MDD/CODIE/ TOBACCO USE/         EVALUATION     Comprehensive Neuropsychological Testing     DriveAble to assess the cognitive aspects of driving.       Follow up with Psychiatry for optimization of CODIE/MDD           HX OF STROKE (HTN, HLD SMOKER)      ASA 81 mg QD      Vascular Risk Factors (VRFs) stratification (BP, BS, BC control and Smoking Cessation) is the mainstay of  stroke prevention (>90%). The benefit of antiplatelets is < 20% stroke risk reduction.         Healthy diet and exercise    Avoid driving.    Call 911 if any SUDDEN:   Weakness  Numbness  Slurring of speech  Speech difficulty   Vertigo  Loss of balance  Loss of vision  Loss of hearing  Double vision  Trouble swallowing  Trouble breathing  Facial drooping      DISEASE-MODIFYING AGENTS     Explained to the patient and family that medications are intended to slow down the progression (in the early stages of the disease) and not stop it or reverse the disease. The disease is relentless, progressive and so far, cannot be controlled. Progression includes Cognitive decline, Behavioral disturbances, and Motor decline as well.     I counseled the patient and family that the rate of progression is extremely variable, and the average (10 years) is an inaccurate measure.     CLASSES:    NMDA RECEPTOR ANTAGONISTS    Continue memantine/Namenda 10 mg BID      CHOLINESTERASE INHIBITORS (CEI)    Continue donepezil/Aricept 10 mg QHS.      If GI symptoms become an issue will try rivastigmine/Exelon patches. Will obtain EKG before and after Aricept to verify effects on QT interval!  (Other formulations Adlarity TTS 5/10 mg weekly). Patches are convenient, bypass the GI system but have unintended consequences of being taken off prematurely or being duplicated accidentally.       MISCELLANEOUS     [facility] study regarding Sildenafil (Viagra) impact on dementia showed possible benefit. Will wait for high quality prospective double-blinded placebo-controlled trials to make any proper recommendations.     Recommended against the use of OTC non-FDA evaluated supplements and claims.         SYMPTOMATIC MANAGEMENT-BEHAVIORAL SYMPTOMS AND NON-COGNITIVE SYMPTOMS       ANTIDEPRESSANTS CLASS MEDICATIONS    Follow up with psychiatry for behavioral management     Will start LTG 25 mg po BID for agitation     Bupsar 10 mg po BID anxiety  symptoms.     HOME CARE    Falling Down Precautions. Gait is affected by the disease as well.     Avoid driving and access to firearms     Incremental 24/Care     Help with finances and decision making.    Join support group.    Proofing the house and use labeling.    Avoid antihistamines and anticholinergics.    Avoid changing routine.    Use written reminders.    Avoid multitasking.    Healthy diet, exercise (physical and cognitive).    Good sleep hygiene.        HERE ARE 10 WAYS TO REDUCE RISK OF DEMENTIA AND SLOW DOWN THE PROGRESSION OF DEMENTIA        1.Be physically active.    2. Avoid smoking and alcohol consumption.    3. Track your numbers. Keep your blood pressure, cholesterol, blood sugar and weight within recommended ranges.    4. Stay socially connected.    5. Make healthy food choices. Eat a well-balance and healthy diet that rich in cereals, fish, legumes, and vegetables.    6. Reduce stress.     7. Challenge your brain by trying something new, playing games, or learning a new language.    8. Take care of your hearing. Avoid being continuously exposed to loud sounds and wear a hearing aid if hearing does become a problem.    9. Lower risk of falls. Consider installing handrails on all stairs and grab bars in bathrooms.    10. Reduce your exposure to air pollution, such as exposure to exhaust in heavy traffic.         CAREGIVERS COUNSELING     Recommend reading the following books:     The 36-Hour Day and there are many online and printed resources to help caregivers as well.     Alzheimer's Through the Stages.     Dementia with G.R.A.C.E.            PREVENTION OF DELIRIUM       1. Good hydration and avoid electrolyte imbalance  2. Recognize and treat infections immediately especially UTI.  3. Bladder emptying and prevent constipation.   4. Provide stimulating activities and familiar objects  5. Use eyeglasses and hearing aids if needed.   6. Use simple and regular communication about people, current  "place, and time  7. Mobility and range-of-motion exercises  8. Reduce noise, lighting and avoid sleep interruptions  9. Non-narcotic pain management.  10.Nondrug treatment for sleep problems or anxiety  11. Avoid antihistamines.  12. Avoid narcotics.  13. Avoid benzodiazepines.            HELPFUL STRATEGIES FOR THE FAMILY AND CAREGIVERS        BEHAVIORAL MANAGEMENT STRATEGIES     Remember that you cannot reason with acute psychosis or confusion. Unless there is an immediate safety issue, there is no need to challenge or correct the person.  For example, if a pt is hallucinating, do not argue with the person that what they are seeing is not real. If they are expressing paranoia, empathize gently with their fear, and attempt to redirect them to a different activity.   If the person is particularly stuck on a topic or activity, as long as the activity is safe and is not worsening their agitation, you don't need to intervene.     Communicate calmly, simply, and concisely    Reassure the person that they are safe and you are here to help  Try not to express irritation or anger  Speak quietly and calmly, do not shout or threaten the person. Avoid provocation.   Establish verbal contact and provide orientation and reassurance.  Attempt to identify the patient's wants and feelings, listen to what they are saying, and reflect those wants and feelings back to the patient.  Dont use sarcasm as a weapon    Set clear limits on behavior in a calm voice ("You cannot hit the nurse.")  Offer choices and optimism ("Which toothpaste would you like to use today?")    Redirect the person to an alternative behavior ("Can you come help me with this?)    Avoid saying things like, "We already went over this!" "You can't keep doing this." "I already explained this to you"  Instead, simply nod calmly and acknowledge what the person is saying ("Yes, that makes sense."), and then request their help or company in a different behavior.   Having " a mental list of activities the patient enjoys can be helpful with redirection. For example, do they enjoy going for walks? Eating a piece of candy?   If redirection becomes challenging, you can place objects that your family member enjoys strategically in the home in order to use for distraction. This is particularly useful if there are items that they often talk about or frequently ask you questions about; or if they are visually striking. Once you focus the person on this object, talk about it briefly until they are calm and then attempt to redirect to a new behavior.   Sometimes activities which are repetitive can be calming, particularly if there is a comforting sensory element. For example, pt may enjoy folding soft towels or laundry.    Limit overstimulation    Decrease distractions by turning off the TV, computer, any fluorescent lights that hum, etc.  Ask any casual visitors to leave--the fewer people the better  If the person is very agitated, avoid touching them, and respect their personal space  Sit down and ask the person to sit down also     PREVENTATIVE STRATEGIES     Try to help the patient develop a routine and stick to it  Address all immediate safety issues  Does the person still have access to the car and keys? Take the keys away, or disconnect the car battery.  Are they wandering at night? Install locks on the outside doors. A keypad lock works well. Alarms on bedroom doors can also be helpful.   Are they turning on the stove and risking a fire? If you cannot limit their access to the kitchen, you may need to unplug dangerous devices any time you are not in the room.   If the person is exhibiting poor judgment throughout the day, they likely need someone supervising them at all times.   Do they still have access to significant financial assets? Limit their access to accounts, and consult with a  if necessary.   Identify situations that seem to trigger agitation or a problematic behavior,  "and modify the person's environment to minimize or eliminate that trigger.   For example, is their behavior worse if they don't get a good night's sleep? Or if they don't eat or drink enough? If so, prioritize those activities and build behavior plans to support them.  Do they get upset about not being allowed to answer the phone when it rings? Put all of the phones in the house on "silent."   Do they become paranoid that certain family members are trying to take advantage of them? Limit contact with the targeted family member as much as possible, and re-introduce them slowly after some time has passed.   Encourage independence in daily living whenever safely possible  Encourage collaborative decision-making regarding his care as well as daily activities  Encourage regular physical exercise  Address issues that may be impacting sleep   Ex: Urology consult for frequent nighttime urination, address chronic pain that may be interfering with sleep, moving to a different room if his roommate snores loudly, make sure the room is a comfortable temperature and he has adequate pillows and blankets  Ensure he gets out into the sunlight at least once per day to encourage regular circadian cycle  No caffeine, sugar, or large meals within two hours of bedtime   Make sure room at night is dark and quiet and minimize nighttime interruptions from staff unless medically necessary   Try to help pt go to sleep and wake up at the same time every day  Monitor closely for worsening psychiatric symptoms (insomnia, social withdrawal, deterioration of personal hygiene, hostility, confusing or nonsensical speech, paranoia, hallucinations) and intervene promptly. Assess for possible antecedents, modify environment appropriately.            SLEEP HYGIENE     Poor sleep has a negative effect on cognition. Several strategies have been shown to improve sleep:     Caffeine intake in the afternoon and evening, as well as stuffing oneself at " supper, can decrease the quality of restful sleep throughout the night.   Bedtime and wake-up times should be consistent every night and morning so the body becomes used to a single routine, even on the weekends.  Engage in daily physical activity, but not 2-3 hours before bedtime.   No technology use (television, computer, iPad) 1-2 hours before bed.   Have a wind down routine (e.g., soft lights in the house, bath before bed, reduced fluid intake, songs, reading, less noise) to promote sleep readiness.   Visit the www.sleepfoundation.org for more strategies.      COGNITIVE HYGIENE     Engage in regular exercise, which increases alertness and arousal and can improve attention and focus.  Consider lower impact exercises, such as yoga or light walking.  Get a good nights sleep, as this can enhance alertness and cognition.  Eat healthy foods and balanced meals. It is notable that research indicates certain nutrients may aid in brain function, such as B vitamins (especially B6, B12, and folic acid), antioxidants (such as vitamins C and E, and beta carotene), and Omega-3 fatty acids. Talk with your physician or nutritionist about whats right for you.   Keep your brain active. Find activities to stay mentally active, such as reading, games (cards, checkers), puzzles (crosswords, Sudoku, jig saw), crafts (models, woodworking), gardening, or participating in activities in the community.  Stay socially engaged. Continue staying active with your family and friends.      FUTURE PLANNING     The patient and caregivers should consider formal arrangements to allow a designated person to make medical and financial decisions for the pt, should he/she become unable to do so.  Options to consider include designating a healthcare proxy, medical and/or financial power of , and completing advanced directives for healthcare decisions and estate planning (e.g., finalizing a will).  If cost is prohibitive, [facility]  Legal Services (https://slls.org/) provides free  for individuals with low income.       ADDITIONAL RESOURCES     Alzheimer's Services of the [facility] (www.http://alzbr.org) have good local caregiver and patient resources.   Consider resources for support through the [facility] Office of Elderly Affairs (http://goea.[place].[facility]/), [place] Chapter of the Alzheimers Association (www.alz.org/[place]/), the Family Caregiver Tolar (www.caregiver.org), and the American Psychological Association (http://www.apa.org/pi/about/publications/caregivers/consumers/index.aspxconsumers/index.aspx).  For More Information About Hallucinations, Delusions, and Paranoia in Alzheimer's [facility] Alzheimer's and related Dementias Education and Referral (ADEAR) Center 1-196.376.8270 (toll-free) adear@efren.nih.gov www.efren.nih.gov/alzheimers The National [employer] on Aging's ADEAR Center offers information and free print publications about Alzheimer's disease and related dementias for families, caregivers, and health professionals. ADEAR Center staff answer telephone, email, and written requests and make referrals to local and national resources            MEDICAL/SURGICAL COMORBIDITIES     All relevant medical comorbidities noted and managed by primary care physician and medical care team.          MISCELLANEOUS MEDICAL PROBLEMS       HEALTHY LIFESTYLE AND PREVENTATIVE CARE    Encouraged the patient to adhere to the age-appropriate health maintenance guidelines including screening tests and vaccinations.     Discussed the overall importance of healthy lifestyle, optimal weight, exercise, healthy diet, good sleep hygiene and avoiding drugs including smoking, alcohol and recreational drugs. The patient verbalized full understanding.       Advised the patient to follow COVID-19 prevention measures.       I spent 30 minutes total E/M more than 50 % spent face to face with the patient    Time spent in counseling and coordination  of care including discussions etiology of diagnosis, pathogenesis of diagnosis, prognosis of diagnosis,, diagnostic results, impression and recommendations, diagnostic studies, management, risks and benefits of treatment, instructions of disease self-management, treatment instructions, follow up requirements, patient and family counseling/involvement in care compliance with treatment regimen. All of the patient's questions were answered during this discussion.     Kala Thorpe, MSN NP      Collaborating Provider: Cali Piedra MD, FAAN Neurologist/Epileptologist

## 2024-01-29 ENCOUNTER — PATIENT MESSAGE (OUTPATIENT)
Dept: NEUROLOGY | Facility: CLINIC | Age: 74
End: 2024-01-29
Payer: MEDICARE

## 2024-02-07 ENCOUNTER — OFFICE VISIT (OUTPATIENT)
Dept: PSYCHIATRY | Facility: CLINIC | Age: 74
End: 2024-02-07
Payer: MEDICARE

## 2024-02-07 DIAGNOSIS — F32.5 MAJOR DEPRESSIVE DISORDER WITH SINGLE EPISODE, IN FULL REMISSION: Primary | ICD-10-CM

## 2024-02-07 DIAGNOSIS — F41.9 ANXIETY: ICD-10-CM

## 2024-02-07 PROCEDURE — 90834 PSYTX W PT 45 MINUTES: CPT | Mod: 95,,, | Performed by: SOCIAL WORKER

## 2024-02-07 NOTE — PROGRESS NOTES
Individual Psychotherapy (PhD/LCSW)    Due to the nature of this visit type, a virtual visit with synchronous audio and video, each patient to whom this provider administers behavioral health services by telemedicine is: (1) informed of the relationship between the provider and patient and the respective role of any other health care provider with respect to management of the patient; and (2) notified that he or she may decline to receive services by telemedicine and may withdraw from such care at any time. If technological issues occur, at the professional discretion of the clinical provider, synchronous audio only services may be utilized after unsuccessful attempt(s) to connect via audiovisual services; similarly, if audio only visit occurs, patient's verbal consent will be obtained prior to receipt of service. Prevailing clinical standards of care are upheld despite service methodology; having said this, if the clinical provider is unable to meet the prevailing standards of care, the patient will be rescheduled for the provider's soonest availability - as clinically appropriate.     The patient was informed of the following:     Provider's contact info:  [facility] [address], [address], [address]  [place] [address]  (Phone) [phone]    If technology issues occur, call office phone: Ph: [phone]  If crisis: Dial 911 or go to nearest Emergency Room (ER)  If questions related to privacy practices: contact [facility] Information Department: [phone]    For security purposes, the pt identified that they were at [address]  [place] [address] during today's session and contact number is [phone].    The pt's emergency contact(s) is Extended Emergency Contact Information  Primary Emergency Contact: Jas Pate  Address: [address]           [address]           [place] [address] [facility]  [employer] Phone: [phone]  Relation: Spouse  Secondary Emergency Contact: Flor Ngo  Home Phone: [phone]  Relation: Sister  Preferred language: English.    Crisis Disclaimer: Patient was informed that due to the virtual nature of the visit, that if a crisis develops, protocols will be implemented to ensure patient safety, including but not limited to: 1) Initiating a welfare check with local law enforcement and/or 2) Calling 911.    2/7/2024    Site:  Telemed         Therapeutic Intervention: Met with patient.  Outpatient - Insight oriented psychotherapy 45 min - CPT code 37694    Chief complaint/reason for encounter: depression, anxiety, and interpersonal        2/5/2024    12:19 PM 2/2/2024     3:13 PM 11/2/2023     1:30 PM 5/24/2023     1:26 PM   PHQ-9 Depression Patient Health Questionnaire   Patient agreed to terms: Yes Yes Yes Yes   Little interest or pleasure in doing things 2 0 1 1   Feeling down, depressed, or hopeless 3 0 2 1   Trouble falling or staying asleep, or sleeping too much 1 0 2 2   Feeling tired or having little energy 3 0 2 2   Poor appetite or overeating 1 0 1 0   Feeling bad about yourself - or that you are a failure or have let yourself or your family down 3 0 3 0   Trouble concentrating on things, such as reading the newspaper or watching television 3 0 3 0   Moving or speaking so slowly that other people could have noticed. Or the opposite - being so fidgety or restless that you have been moving around a lot more than usual 2 0 1 0   Thoughts that you would be better off dead, or of hurting yourself in some way 0 0 0 0   PHQ-9 Total Score 18 0 15 6   If you checked off any problems, how difficult have these problems made it for you to do your work, take care of things at home, or get along with other people? Very difficult Not difficult at all Somewhat difficult Somewhat difficult   Interpretation Moderately Severe Minimal or None Moderately Severe Mild            No data  to display                 Interval history and content of current session:  Met with this patient for her online follow-up appointment.  This is the patient's 2nd appointment.  At her 1st appointment she described her problem of having had a stroke while working for [employer] at the [employer].  She then moved from [place] to [place] leaving her  in [place].  He could not make the house payment and came to live with her in her 1 bedroom apartment.  Her 22-year-old grandson also lives with them.  She stated that the same problem she had before continue.  The family is trying to drop together for the benefit of everyone.  They are trying to support each other emotionally and be there for 1 another.  She continues to worry about her cognitive problems but states that she has been doing much better in her healing and feels that her memory is returning.  She stated that her 20-year-old granddaughter who is in college is now pregnant and they are expecting a new great grandchild.  Her daughter who still lived in [place] has been looking for a new job in the [place] area so they can all live in the same town.  Discussed self-care with the patient as well as ways to decrease her anxiety.  Discussed the importance of activity, and getting outside every day in the fresh air and sunshine.  Also discussed the importance of good nutrition, breathing, and spending time with her family and friends.  The patient stated that she is working toward positive activities and a positive attitude.  She stated she would make a follow-up appointment.    Treatment plan:  Target symptoms: depression, anxiety   Why chosen therapy is appropriate versus another modality: patient responds to this modality  Outcome monitoring methods: self-report  Therapeutic intervention type: insight oriented psychotherapy    Risk parameters:  Patient reports no suicidal ideation  Patient reports no homicidal ideation  Patient reports  no self-injurious behavior  Patient reports no violent behavior    Verbal deficits: None    Patient's response to intervention:  The patient's response to intervention is accepting.    Progress toward goals and other mental status changes:  The patient's progress toward goals is fair .    Diagnosis:     ICD-10-CM ICD-9-CM   1. Major depressive disorder with single episode, in full remission  F32.5 296.26   2. Anxiety  F41.9 300.00       Plan:  individual psychotherapy    Return to clinic: as scheduled    Length of Service (minutes): 45       Migdalia Grady LCSW  03/03/2024   8:44 AM

## 2024-02-13 ENCOUNTER — OFFICE VISIT (OUTPATIENT)
Dept: RHEUMATOLOGY | Facility: CLINIC | Age: 74
End: 2024-02-13
Payer: MEDICARE

## 2024-02-13 ENCOUNTER — PATIENT MESSAGE (OUTPATIENT)
Dept: ADMINISTRATIVE | Facility: OTHER | Age: 74
End: 2024-02-13
Payer: MEDICARE

## 2024-02-13 ENCOUNTER — LAB VISIT (OUTPATIENT)
Dept: LAB | Facility: HOSPITAL | Age: 74
End: 2024-02-13
Attending: FAMILY MEDICINE
Payer: MEDICARE

## 2024-02-13 VITALS
DIASTOLIC BLOOD PRESSURE: 65 MMHG | BODY MASS INDEX: 28.36 KG/M2 | HEART RATE: 54 BPM | HEIGHT: 63 IN | WEIGHT: 160.06 LBS | SYSTOLIC BLOOD PRESSURE: 131 MMHG

## 2024-02-13 DIAGNOSIS — R76.8 ANA POSITIVE: Primary | ICD-10-CM

## 2024-02-13 DIAGNOSIS — R53.83 FATIGUE, UNSPECIFIED TYPE: ICD-10-CM

## 2024-02-13 DIAGNOSIS — R76.8 ANA POSITIVE: ICD-10-CM

## 2024-02-13 LAB
25(OH)D3+25(OH)D2 SERPL-MCNC: 22 NG/ML (ref 30–96)
BILIRUB UR QL STRIP: NEGATIVE
C3 SERPL-MCNC: 133 MG/DL (ref 50–180)
C4 SERPL-MCNC: 37 MG/DL (ref 11–44)
CLARITY UR: CLEAR
COLOR UR: YELLOW
CREAT UR-MCNC: 77 MG/DL (ref 15–325)
GLUCOSE UR QL STRIP: NEGATIVE
HGB UR QL STRIP: NEGATIVE
KETONES UR QL STRIP: NEGATIVE
LEUKOCYTE ESTERASE UR QL STRIP: NEGATIVE
NITRITE UR QL STRIP: NEGATIVE
PH UR STRIP: 6 [PH] (ref 5–8)
PROT UR QL STRIP: NEGATIVE
PROT UR-MCNC: 7 MG/DL (ref 0–15)
PROT/CREAT UR: 0.09 MG/G{CREAT} (ref 0–0.2)
SP GR UR STRIP: 1.02 (ref 1–1.03)
URN SPEC COLLECT METH UR: NORMAL

## 2024-02-13 PROCEDURE — 86160 COMPLEMENT ANTIGEN: CPT | Mod: 59 | Performed by: STUDENT IN AN ORGANIZED HEALTH CARE EDUCATION/TRAINING PROGRAM

## 2024-02-13 PROCEDURE — 36415 COLL VENOUS BLD VENIPUNCTURE: CPT | Performed by: STUDENT IN AN ORGANIZED HEALTH CARE EDUCATION/TRAINING PROGRAM

## 2024-02-13 PROCEDURE — 86160 COMPLEMENT ANTIGEN: CPT | Performed by: STUDENT IN AN ORGANIZED HEALTH CARE EDUCATION/TRAINING PROGRAM

## 2024-02-13 PROCEDURE — 99999 PR PBB SHADOW E&M-EST. PATIENT-LVL IV: CPT | Mod: PBBFAC,,, | Performed by: STUDENT IN AN ORGANIZED HEALTH CARE EDUCATION/TRAINING PROGRAM

## 2024-02-13 PROCEDURE — 81003 URINALYSIS AUTO W/O SCOPE: CPT | Performed by: STUDENT IN AN ORGANIZED HEALTH CARE EDUCATION/TRAINING PROGRAM

## 2024-02-13 PROCEDURE — 82570 ASSAY OF URINE CREATININE: CPT | Performed by: STUDENT IN AN ORGANIZED HEALTH CARE EDUCATION/TRAINING PROGRAM

## 2024-02-13 PROCEDURE — 82306 VITAMIN D 25 HYDROXY: CPT | Performed by: STUDENT IN AN ORGANIZED HEALTH CARE EDUCATION/TRAINING PROGRAM

## 2024-02-13 PROCEDURE — 99204 OFFICE O/P NEW MOD 45 MIN: CPT | Mod: S$GLB,,, | Performed by: STUDENT IN AN ORGANIZED HEALTH CARE EDUCATION/TRAINING PROGRAM

## 2024-02-13 NOTE — PROGRESS NOTES
RHEUMATOLOGY CLINIC INITIAL VISIT    Reason for consult:- positive DIEGO    Chief complaints, HPI, ROS, EXAM, Assessment & Plans:-    Rosa Isela Pate is a 73 y.o. pleasant female who presents to be evaluated for positive DIEGO.  This was obtained by Neurology in working up memory loss.  Patient denies any family history of autoimmune condition she is aware of.  She has had 3 successful pregnancies and no miscarriages.  The patient denies fevers, patchy alopecia, oral/nasal ulcers, sicca symptoms, rashes, photosensitivity, joint pain/swelling, pleuritic chest pain, shortness of breath, cough, abdominal pain, diarrhea, bloody stool, weakness, numbness/tingling, and Raynaud's.  Physical exam is unremarkable.       Reviewed all available old and outside pertinent medical records.    All lab results personally reviewed and interpreted by me.    1. DIEGO positive    2. Fatigue, unspecified type        Problem List Items Addressed This Visit    None  Visit Diagnoses       DIEGO positive    -  Primary    Relevant Orders    C4 Complement    C3 Complement    Urinalysis    Protein/Creatinine Ratio, Urine    Vitamin D    Fatigue, unspecified type        Relevant Orders    C4 Complement    C3 Complement    Urinalysis    Protein/Creatinine Ratio, Urine    Vitamin D            Patient presenting to be evaluated for positive DIEGO obtained in working up memory loss  Her DIEGO was positive at 1:80 homogeneous with negative extended profile  History, exam and labwork thus far do not raise suspicion for underlying rheumatologic disease  We will check C3 and C4 as well as UA/UPCR   Check vitamin-D    # Follow up if symptoms worsen or fail to improve.    Chronic comorbid conditions affecting medical decision making today:    Past Medical History:   Diagnosis Date    Chronic kidney disease, stage 3a 04/20/2023    Depression     Hyperlipidemia     Hypertension     Obesity     Stroke     Tobacco dependence        Past Surgical History:   Procedure  Laterality Date    CHOLECYSTECTOMY      removal    COLONOSCOPY N/A 11/1/2017    Procedure: COLONOSCOPY;  Surgeon: Francisco Javier Bai MD;  Location: [facility] ENDO;  Service: Endoscopy;  Laterality: N/A;    COLONOSCOPY N/A 6/26/2023    Procedure: COLONOSCOPY;  Surgeon: Vicki Krishna MD;  Location: [facility] ENDO;  Service: Endoscopy;  Laterality: N/A;    HYSTERECTOMY      MYOMECTOMY      removal    OOPHORECTOMY      TUBAL LIGATION          Social History     Tobacco Use    Smoking status: Every Day     Current packs/day: 1.00     Average packs/day: 1 pack/day for 51.0 years (51.0 ttl pk-yrs)     Types: Cigarettes    Smokeless tobacco: Never   Substance Use Topics    Alcohol use: Not Currently     Comment: occ    Drug use: No       Family History   Problem Relation Age of Onset    Stroke Mother     Hypertension Mother     Hypertension Father     Heart disease Father     Cancer Sister     Diabetes Neg Hx        Review of patient's allergies indicates:   Allergen Reactions    Enalapril Other (See Comments)     Ace cough       Medication List with Changes/Refills   Current Medications    AMLODIPINE (NORVASC) 5 MG TABLET    TAKE 1 TABLET BY MOUTH ONCE DAILY APPOINTMENT NEEDED  WITH DR. CORREA FOR  FUTURE REFILLS    ASPIRIN 81 MG CHEW    Take 1 tablet (81 mg total) by mouth once daily.    ATORVASTATIN (LIPITOR) 80 MG TABLET    Take 1 tablet (80 mg total) by mouth nightly.    BUSPIRONE (BUSPAR) 5 MG TAB    Take 2 tablets (10 mg total) by mouth 2 (two) times daily.    DONEPEZIL (ARICEPT) 10 MG TABLET    Take 1 tablet (10 mg total) by mouth every evening. Take one half tablet for one week then as prescribed.    IRBESARTAN-HYDROCHLOROTHIAZIDE (AVALIDE) 150-12.5 MG PER TABLET    Take 1 tablet by mouth once daily.    LAMOTRIGINE (LAMICTAL) 25 MG TABLET    Take 2 tablets (50 mg total) by mouth 2 (two) times daily.    MEMANTINE (NAMENDA) 10 MG TAB    Take 1 tablet (10 mg total) by mouth 2 (two) times daily.    MULTIVITAMIN WITH  MINERALS TABLET             Disclaimer: This note was prepared using voice recognition system and is likely to have sound alike errors and is not proofread.  Please message me with any questions.    45 minutes of total time spent on the encounter, which includes face to face time and non-face to face time preparing to see the patient (eg, review of tests), Obtaining and/or reviewing separately obtained history, Documenting clinical information in the electronic or other health record, Independently interpreting results (not separately reported) and communicating results to the patient/family/caregiver, or Care coordination (not separately reported).     Thank you for allowing me to participate in the care of Rosa Isela Pate.    Andi Broussard MD

## 2024-02-15 ENCOUNTER — PATIENT MESSAGE (OUTPATIENT)
Dept: INTERNAL MEDICINE | Facility: CLINIC | Age: 74
End: 2024-02-15
Payer: MEDICARE

## 2024-02-15 DIAGNOSIS — R41.841 COGNITIVE COMMUNICATION DISORDER: ICD-10-CM

## 2024-02-15 DIAGNOSIS — R41.3 MEMORY LOSS: ICD-10-CM

## 2024-02-15 NOTE — PROGRESS NOTES
Complement levels normal.  No excess protein in urine.  Vitamin-D is slightly low.  Recommend taking vitamin D3 5000 IU daily.

## 2024-02-16 ENCOUNTER — PATIENT MESSAGE (OUTPATIENT)
Dept: NEUROLOGY | Facility: CLINIC | Age: 74
End: 2024-02-16
Payer: MEDICARE

## 2024-02-16 DIAGNOSIS — R41.841 COGNITIVE COMMUNICATION DISORDER: ICD-10-CM

## 2024-02-16 DIAGNOSIS — R41.3 MEMORY LOSS: ICD-10-CM

## 2024-02-16 DIAGNOSIS — F02.811 MAJOR NEUROCOGNITIVE DISORDER DUE TO ANOTHER MEDICAL CONDITION, WITH AGITATION: ICD-10-CM

## 2024-02-16 DIAGNOSIS — R53.1 RIGHT SIDED WEAKNESS: ICD-10-CM

## 2024-02-16 DIAGNOSIS — I63.81 LACUNAR INFARCTION: ICD-10-CM

## 2024-02-16 DIAGNOSIS — R41.3 OTHER AMNESIA: ICD-10-CM

## 2024-02-16 DIAGNOSIS — F32.5 MAJOR DEPRESSIVE DISORDER WITH SINGLE EPISODE, IN FULL REMISSION: ICD-10-CM

## 2024-02-16 RX ORDER — MEMANTINE HYDROCHLORIDE 10 MG/1
10 TABLET ORAL 2 TIMES DAILY
Qty: 60 TABLET | Refills: 11 | Status: SHIPPED | OUTPATIENT
Start: 2024-02-16 | End: 2024-05-20 | Stop reason: SDUPTHER

## 2024-02-16 RX ORDER — DONEPEZIL HYDROCHLORIDE 10 MG/1
10 TABLET, FILM COATED ORAL NIGHTLY
Qty: 90 TABLET | Refills: 3 | Status: SHIPPED | OUTPATIENT
Start: 2024-02-16 | End: 2025-02-15

## 2024-03-10 ENCOUNTER — PATIENT MESSAGE (OUTPATIENT)
Dept: INTERNAL MEDICINE | Facility: CLINIC | Age: 74
End: 2024-03-10
Payer: MEDICARE

## 2024-03-10 DIAGNOSIS — I10 ESSENTIAL HYPERTENSION: ICD-10-CM

## 2024-03-10 DIAGNOSIS — T46.4X5A ACE-INHIBITOR COUGH: ICD-10-CM

## 2024-03-10 DIAGNOSIS — R05.8 ACE-INHIBITOR COUGH: ICD-10-CM

## 2024-03-11 RX ORDER — IRBESARTAN AND HYDROCHLOROTHIAZIDE 150; 12.5 MG/1; MG/1
1 TABLET, FILM COATED ORAL DAILY
Qty: 90 TABLET | Refills: 2 | Status: SHIPPED | OUTPATIENT
Start: 2024-03-11

## 2024-03-11 NOTE — TELEPHONE ENCOUNTER
Provider Staff:  Action required for this patient    Requires labs      Please see care gap opportunities below in Care Due Message.    Thanks!  [facility]     Appointments      Date Provider   Last Visit   10/23/2023 Sherrell Drummond MD   Next Visit   5/1/2024 Sherrell Drummond MD     Refill Decision Note   Rosa Isela Pate  is requesting a refill authorization.  Brief Assessment and Rationale for Refill:  Approve     Medication Therapy Plan:         Comments:     Note composed:1:28 PM 03/11/2024             Appointments     Last Visit   10/23/2023 Sherrell Drummond MD   Next Visit   5/1/2024 Sherrell Drummond MD

## 2024-03-11 NOTE — TELEPHONE ENCOUNTER
Care Due:                  Date            Visit Type   Department     Provider  --------------------------------------------------------------------------------                                EP -                              PRIMARY      [facility] INTERNAL  Last Visit: 10-      [facility]   KAREN Drummond                              EP -                              PRIMARY      [facility] INTERNAL  Next Visit: 05-      [facility]   KAREN Drummond                                                            Last  Test          Frequency    Reason                     Performed    Due Date  --------------------------------------------------------------------------------    CMP.........  12 months..  atorvastatin.............  04-   04-    Lipid Panel.  12 months..  atorvastatin.............  04-   04-    [facility] Embedded Care Due Messages. Reference number: [id number].   3/11/2024 8:59:46 AM CDT

## 2024-03-15 ENCOUNTER — PATIENT MESSAGE (OUTPATIENT)
Dept: INTERNAL MEDICINE | Facility: CLINIC | Age: 74
End: 2024-03-15
Payer: MEDICARE

## 2024-03-18 ENCOUNTER — OFFICE VISIT (OUTPATIENT)
Dept: INTERNAL MEDICINE | Facility: CLINIC | Age: 74
End: 2024-03-18
Payer: MEDICARE

## 2024-03-18 ENCOUNTER — NURSE TRIAGE (OUTPATIENT)
Dept: ADMINISTRATIVE | Facility: CLINIC | Age: 74
End: 2024-03-18
Payer: MEDICARE

## 2024-03-18 VITALS
OXYGEN SATURATION: 95 % | HEIGHT: 63 IN | SYSTOLIC BLOOD PRESSURE: 108 MMHG | TEMPERATURE: 97 F | RESPIRATION RATE: 16 BRPM | WEIGHT: 160.06 LBS | DIASTOLIC BLOOD PRESSURE: 62 MMHG | BODY MASS INDEX: 28.36 KG/M2 | HEART RATE: 82 BPM

## 2024-03-18 DIAGNOSIS — J20.9 ACUTE BRONCHITIS, UNSPECIFIED ORGANISM: Primary | ICD-10-CM

## 2024-03-18 DIAGNOSIS — F17.210 CIGARETTE NICOTINE DEPENDENCE WITHOUT COMPLICATION: ICD-10-CM

## 2024-03-18 PROCEDURE — 99214 OFFICE O/P EST MOD 30 MIN: CPT | Mod: S$GLB,,, | Performed by: INTERNAL MEDICINE

## 2024-03-18 PROCEDURE — 99999 PR PBB SHADOW E&M-EST. PATIENT-LVL V: CPT | Mod: PBBFAC,,, | Performed by: INTERNAL MEDICINE

## 2024-03-18 RX ORDER — ALBUTEROL SULFATE 90 UG/1
2 AEROSOL, METERED RESPIRATORY (INHALATION) EVERY 6 HOURS PRN
Qty: 18 G | Refills: 0 | Status: SHIPPED | OUTPATIENT
Start: 2024-03-18 | End: 2024-05-08

## 2024-03-18 RX ORDER — PROMETHAZINE HYDROCHLORIDE AND DEXTROMETHORPHAN HYDROBROMIDE 6.25; 15 MG/5ML; MG/5ML
5 SYRUP ORAL EVERY 8 HOURS PRN
Qty: 118 ML | Refills: 0 | Status: SHIPPED | OUTPATIENT
Start: 2024-03-18 | End: 2024-03-28

## 2024-03-18 NOTE — TELEPHONE ENCOUNTER
Pt c/o congestion, cough, head congestion x3 days. Denies fever. Productive cough ,states white sputum. Pt states feels wheezing when laying down. Cough worse at night. Per protocol see physician within 24 hours. Discussed at home treatment. Able to schedule appt within timeframe. Advised pt to monitor for new or worsening symptoms and to call back for any further questions or concerns.   Reason for Disposition   SEVERE coughing spells (e.g., whooping sound after coughing, vomiting after coughing)    Additional Information   Negative: SEVERE difficulty breathing (e.g., struggling for each breath, speaks in single words)   Negative: Bluish (or gray) lips or face now   Negative: [1] Difficulty breathing AND [2] exposure to flames, smoke, or fumes   Negative: [1] Stridor AND [2] difficulty breathing   Negative: Sounds like a life-threatening emergency to the triager   Negative: [1] MODERATE difficulty breathing (e.g., speaks in phrases, SOB even at rest, pulse 100-120) AND [2] still present when not coughing   Negative: Chest pain  (Exception: MILD central chest pain, present only when coughing.)   Negative: Patient sounds very sick or weak to the triager   Negative: [1] MILD difficulty breathing (e.g., minimal/no SOB at rest, SOB with walking, pulse <100) AND [2] still present when not coughing   Negative: [1] Coughed up blood AND [2] > 1 tablespoon (15 ml)   (Exception: Blood-tinged sputum.)   Negative: Fever > 103 F (39.4 C)   Negative: [1] Fever > 101 F (38.3 C) AND [2] age > 60 years   Negative: [1] Fever > 100.0 F (37.8 C) AND [2] bedridden (e.g., CVA, chronic illness, recovering from surgery)   Negative: [1] Fever > 100.0 F (37.8 C) AND [2] diabetes mellitus or weak immune system (e.g., HIV positive, cancer chemo, splenectomy, organ transplant, chronic steroids)   Negative: Wheezing is present    Protocols used: Cough - Acute [id number]-C-NN

## 2024-03-18 NOTE — PROGRESS NOTES
Rosa Isela A [place]oy[place]  73 y.o. Black or  female     Chief Complaint   Patient presents with    Cough      PCP: Sherrell Drummond M.D.    HPI: Presents to the clinic with complaint of a cough x 3-4 days. Her cough is productive of a clear to white mucous. She denies a fever, chills, chest pain or shortness of breath. She has noticed wheezing on occasion. She denies sick contacts. She is a smoker.   She has not taken any medication for her symptoms.     Past Medical History:   Diagnosis Date    Chronic kidney disease, stage 3a 04/20/2023    Depression     Hyperlipidemia     Hypertension     Obesity     Stroke     Tobacco dependence        Current Outpatient Medications:     amLODIPine (NORVASC) 5 MG tablet, TAKE 1 TABLET BY MOUTH ONCE DAILY APPOINTMENT NEEDED  WITH DR. CORREA FOR  FUTURE REFILLS, Disp: 90 tablet, Rfl: 3    aspirin 81 MG Chew, Take 1 tablet (81 mg total) by mouth once daily., Disp: 90 tablet, Rfl: 3    atorvastatin (LIPITOR) 80 MG tablet, Take 1 tablet (80 mg total) by mouth nightly., Disp: 90 tablet, Rfl: 3    busPIRone (BUSPAR) 5 MG Tab, Take 2 tablets (10 mg total) by mouth 2 (two) times daily., Disp: 120 tablet, Rfl: 11    donepeziL (ARICEPT) 10 MG tablet, Take 1 tablet (10 mg total) by mouth every evening. Take one half tablet for one week then as prescribed., Disp: 90 tablet, Rfl: 3    irbesartan-hydrochlorothiazide (AVALIDE) 150-12.5 mg per tablet, Take 1 tablet by mouth once daily., Disp: 90 tablet, Rfl: 2    lamoTRIgine (LAMICTAL) 25 MG tablet, Take 2 tablets (50 mg total) by mouth 2 (two) times daily., Disp: 120 tablet, Rfl: 5    multivitamin with minerals tablet, , Disp: , Rfl:     memantine (NAMENDA) 10 MG Tab, Take 1 tablet (10 mg total) by mouth 2 (two) times daily., Disp: 60 tablet, Rfl: 11    Review of patient's allergies indicates:   Allergen Reactions    Enalapril Other (See Comments)     Ace cough     PE: Reviewed vitals  GENERAL: Alert and oriented, no acute  distress  ENT: No tonsillar exudate, mild pharyngeal erythema  NECK: No lymphadenopathy  HEART: Regular rate and rhythm  LUNGS: Unlabored respirations, bilaterally clear to auscultation     ASSESSMENT/PLAN:    Rosa Isela was seen today for cough.    Diagnoses and all orders for this visit:    Acute bronchitis, unspecified organism  -     promethazine-dextromethorphan (PROMETHAZINE-DM) 6.25-15 mg/5 mL Syrp; Take 5 mLs by mouth every 8 (eight) hours as needed.  -     albuterol (VENTOLIN HFA) 90 mcg/actuation inhaler; Inhale 2 puffs into the lungs every 6 (six) hours as needed for Wheezing. Rescue  -     Recommend increased fluids and rest  -     Educational information provided    Cigarette nicotine dependence without complication  -     recommend cessation     RTC: If symptoms worsen or fail to resolve

## 2024-03-22 ENCOUNTER — PATIENT MESSAGE (OUTPATIENT)
Dept: INTERNAL MEDICINE | Facility: CLINIC | Age: 74
End: 2024-03-22
Payer: MEDICARE

## 2024-03-25 ENCOUNTER — PATIENT MESSAGE (OUTPATIENT)
Dept: INTERNAL MEDICINE | Facility: CLINIC | Age: 74
End: 2024-03-25
Payer: MEDICARE

## 2024-03-25 DIAGNOSIS — E78.5 HYPERLIPIDEMIA, UNSPECIFIED HYPERLIPIDEMIA TYPE: Primary | ICD-10-CM

## 2024-03-25 RX ORDER — ATORVASTATIN CALCIUM 80 MG/1
80 TABLET, FILM COATED ORAL NIGHTLY
Qty: 90 TABLET | Refills: 3 | Status: SHIPPED | OUTPATIENT
Start: 2024-03-25

## 2024-04-03 ENCOUNTER — PATIENT MESSAGE (OUTPATIENT)
Dept: INTERNAL MEDICINE | Facility: CLINIC | Age: 74
End: 2024-04-03
Payer: MEDICARE

## 2024-04-08 ENCOUNTER — PATIENT MESSAGE (OUTPATIENT)
Dept: OTHER | Facility: OTHER | Age: 74
End: 2024-04-08
Payer: MEDICARE

## 2024-04-08 ENCOUNTER — PATIENT MESSAGE (OUTPATIENT)
Dept: INTERNAL MEDICINE | Facility: CLINIC | Age: 74
End: 2024-04-08
Payer: MEDICARE

## 2024-04-08 DIAGNOSIS — F02.811 MAJOR NEUROCOGNITIVE DISORDER DUE TO ANOTHER MEDICAL CONDITION, WITH AGITATION: ICD-10-CM

## 2024-04-08 DIAGNOSIS — F41.9 ANXIETY: ICD-10-CM

## 2024-04-08 DIAGNOSIS — R41.3 MEMORY LOSS: ICD-10-CM

## 2024-04-09 NOTE — TELEPHONE ENCOUNTER
No care due was identified.  [facility] Embedded Care Due Messages. Reference number: [id number].   4/09/2024 5:07:01 PM CDT

## 2024-04-09 NOTE — TELEPHONE ENCOUNTER
Refill Routing Note   Medication(s) are not appropriate for processing by [facility] for the following reason(s):        Outside of protocol    [facility] action(s):  Route               Appointments  past 12m or future 3m with PCP    Date Provider   Last Visit   10/23/2023 Sherrell Drummond MD   Next Visit   5/1/2024 Sherrell Drummond MD   ED visits in past 90 days: 0        Note composed:5:17 PM 04/09/2024

## 2024-04-10 RX ORDER — BUSPIRONE HYDROCHLORIDE 5 MG/1
10 TABLET ORAL 2 TIMES DAILY
Qty: 120 TABLET | Refills: 11 | Status: SHIPPED | OUTPATIENT
Start: 2024-04-10 | End: 2025-04-10

## 2024-04-16 ENCOUNTER — PATIENT MESSAGE (OUTPATIENT)
Dept: INTERNAL MEDICINE | Facility: CLINIC | Age: 74
End: 2024-04-16
Payer: MEDICARE

## 2024-04-16 DIAGNOSIS — F32.2 MAJOR DEPRESSIVE DISORDER, SINGLE EPISODE, SEVERE WITHOUT PSYCHOTIC FEATURES: ICD-10-CM

## 2024-04-17 RX ORDER — AMLODIPINE BESYLATE 5 MG/1
5 TABLET ORAL DAILY
Qty: 90 TABLET | Refills: 1 | Status: SHIPPED | OUTPATIENT
Start: 2024-04-17

## 2024-04-17 NOTE — TELEPHONE ENCOUNTER
Patient is requesting a refill on amlodipine 5mg. Would like sent to [facility] 3/18/24

## 2024-04-17 NOTE — TELEPHONE ENCOUNTER
No care due was identified.  [facility] Embedded Care Due Messages. Reference number: [id number].   4/17/2024 1:28:50 PM CDT

## 2024-04-17 NOTE — TELEPHONE ENCOUNTER
Refill Routing Note   Medication(s) are not appropriate for processing by [facility] for the following reason(s):        No active prescription written by provider    [facility] action(s):  Defer               Appointments  past 12m or future 3m with PCP    Date Provider   Last Visit   10/23/2023 Sherrell Drummond MD   Next Visit   5/1/2024 Sherrell Drummond MD   ED visits in past 90 days: 0        Note composed:1:58 PM 04/17/2024

## 2024-04-18 ENCOUNTER — PATIENT MESSAGE (OUTPATIENT)
Dept: OPHTHALMOLOGY | Facility: CLINIC | Age: 74
End: 2024-04-18

## 2024-04-18 ENCOUNTER — OFFICE VISIT (OUTPATIENT)
Dept: OPHTHALMOLOGY | Facility: CLINIC | Age: 74
End: 2024-04-18
Payer: MEDICARE

## 2024-04-18 DIAGNOSIS — I10 ESSENTIAL HYPERTENSION: Primary | ICD-10-CM

## 2024-04-18 DIAGNOSIS — H43.393 VITREOUS FLOATERS, BILATERAL: ICD-10-CM

## 2024-04-18 DIAGNOSIS — H52.7 REFRACTIVE ERRORS: ICD-10-CM

## 2024-04-18 DIAGNOSIS — H25.13 CATARACT, NUCLEAR SCLEROTIC SENILE, BILATERAL: ICD-10-CM

## 2024-04-18 PROCEDURE — 1159F MED LIST DOCD IN RCRD: CPT | Mod: CPTII,S$GLB,, | Performed by: OPTOMETRIST

## 2024-04-18 PROCEDURE — 92014 COMPRE OPH EXAM EST PT 1/>: CPT | Mod: S$GLB,,, | Performed by: OPTOMETRIST

## 2024-04-18 PROCEDURE — 99999 PR PBB SHADOW E&M-EST. PATIENT-LVL III: CPT | Mod: PBBFAC,,, | Performed by: OPTOMETRIST

## 2024-04-18 PROCEDURE — 92015 DETERMINE REFRACTIVE STATE: CPT | Mod: S$GLB,,, | Performed by: OPTOMETRIST

## 2024-04-18 NOTE — PROGRESS NOTES
SUBJECTIVE  Rosa Isela Pate is 73 y.o. female  Corrected distance visual acuity was 20/25 in the right eye and 20/25 in the left eye. Corrected near visual acuity was J1 in the right eye and J1 in the left eye.   Chief Complaint   Patient presents with    Hypertensive Eye Exam          HPI    Patient states slight decrease with overall vision.   No ocular pain/discomfort and not using any otc drops.  Wear PAL glasses   Last edited by Sonal Pearson on 4/18/2024  8:52 AM.         Assessment /Plan :  1. Essential hypertension   No HTN Retinopathy, monitor annually.      2. Cataract, nuclear sclerotic senile, bilateral   Cataracts are not visually significant and not affecting activities of daily living. Annual observation is recommended at this time. Patient to call or return to clinic with any significant change in vision prior to next visit.     3. Refractive errors   Dispense Final Rx for glasses.  RTC 1 year  Discussed above and answered questions.     4.Discussed signs and symptoms of retinal detachment.  Informed patient to return to clinic if any worsening of symptoms or decreased vision.

## 2024-04-30 DIAGNOSIS — Z00.00 ENCOUNTER FOR MEDICARE ANNUAL WELLNESS EXAM: ICD-10-CM

## 2024-05-01 ENCOUNTER — LAB VISIT (OUTPATIENT)
Dept: LAB | Facility: HOSPITAL | Age: 74
End: 2024-05-01
Attending: FAMILY MEDICINE
Payer: MEDICARE

## 2024-05-01 ENCOUNTER — OFFICE VISIT (OUTPATIENT)
Dept: INTERNAL MEDICINE | Facility: CLINIC | Age: 74
End: 2024-05-01
Payer: MEDICARE

## 2024-05-01 VITALS
HEART RATE: 80 BPM | BODY MASS INDEX: 28.13 KG/M2 | DIASTOLIC BLOOD PRESSURE: 60 MMHG | WEIGHT: 158.75 LBS | OXYGEN SATURATION: 95 % | HEIGHT: 63 IN | SYSTOLIC BLOOD PRESSURE: 110 MMHG | TEMPERATURE: 97 F

## 2024-05-01 DIAGNOSIS — R41.3 MEMORY LOSS: ICD-10-CM

## 2024-05-01 DIAGNOSIS — E78.5 HYPERLIPIDEMIA, UNSPECIFIED HYPERLIPIDEMIA TYPE: ICD-10-CM

## 2024-05-01 DIAGNOSIS — N18.31 CHRONIC KIDNEY DISEASE, STAGE 3A: ICD-10-CM

## 2024-05-01 DIAGNOSIS — F17.210 CIGARETTE NICOTINE DEPENDENCE WITHOUT COMPLICATION: ICD-10-CM

## 2024-05-01 DIAGNOSIS — Z00.00 ANNUAL PHYSICAL EXAM: Primary | ICD-10-CM

## 2024-05-01 DIAGNOSIS — Z78.9 DEFICIT IN ACTIVITIES OF DAILY LIVING (ADL): ICD-10-CM

## 2024-05-01 DIAGNOSIS — R73.03 PREDIABETES: ICD-10-CM

## 2024-05-01 DIAGNOSIS — I10 ESSENTIAL HYPERTENSION: ICD-10-CM

## 2024-05-01 DIAGNOSIS — Z78.0 POSTMENOPAUSAL: ICD-10-CM

## 2024-05-01 DIAGNOSIS — I63.81 LACUNAR INFARCTION: ICD-10-CM

## 2024-05-01 DIAGNOSIS — Z12.31 SCREENING MAMMOGRAM, ENCOUNTER FOR: ICD-10-CM

## 2024-05-01 DIAGNOSIS — R79.9 ABNORMAL FINDING OF BLOOD CHEMISTRY, UNSPECIFIED: ICD-10-CM

## 2024-05-01 PROCEDURE — 1101F PT FALLS ASSESS-DOCD LE1/YR: CPT | Mod: CPTII,S$GLB,, | Performed by: FAMILY MEDICINE

## 2024-05-01 PROCEDURE — 3288F FALL RISK ASSESSMENT DOCD: CPT | Mod: CPTII,S$GLB,, | Performed by: FAMILY MEDICINE

## 2024-05-01 PROCEDURE — 1159F MED LIST DOCD IN RCRD: CPT | Mod: CPTII,S$GLB,, | Performed by: FAMILY MEDICINE

## 2024-05-01 PROCEDURE — 99999 PR PBB SHADOW E&M-EST. PATIENT-LVL IV: CPT | Mod: PBBFAC,,, | Performed by: FAMILY MEDICINE

## 2024-05-01 PROCEDURE — 99214 OFFICE O/P EST MOD 30 MIN: CPT | Mod: S$GLB,,, | Performed by: FAMILY MEDICINE

## 2024-05-01 PROCEDURE — 1126F AMNT PAIN NOTED NONE PRSNT: CPT | Mod: CPTII,S$GLB,, | Performed by: FAMILY MEDICINE

## 2024-05-01 PROCEDURE — 3008F BODY MASS INDEX DOCD: CPT | Mod: CPTII,S$GLB,, | Performed by: FAMILY MEDICINE

## 2024-05-01 PROCEDURE — 3074F SYST BP LT 130 MM HG: CPT | Mod: CPTII,S$GLB,, | Performed by: FAMILY MEDICINE

## 2024-05-01 PROCEDURE — 3078F DIAST BP <80 MM HG: CPT | Mod: CPTII,S$GLB,, | Performed by: FAMILY MEDICINE

## 2024-05-01 NOTE — PROGRESS NOTES
Rosa Isela Pate  05/01/2024  [id number]    Sherrell Drummond MD  Patient Care Team:  Sherrell Drummond MD as PCP - General (Family Medicine)  Anahi Diana LPN as Care Coordinator  Jose M Porter as Digital Medicine Health   Marc Mackay, PharmSHANT as Hypertension Digital Medicine Clinician  Marc Mackay PharmD as Hyperlipidemia Digital Medicine Clinician (Pharmacist)  Sherrell Drummond MD as Hypertension Digital Medicine Responsible Provider (Family Medicine)  Clarissa Arias MD as Hyperlipidemia Digital Medicine Responsible Provider (Family Medicine)          Visit Type:a scheduled routine follow-up visit    Chief Complaint:  Chief Complaint   Patient presents with    Fatigue       History of Present Illness:  She has history of HTN, HLD, Lacunar Stroke with right side weakness and congitive effects, CKD3.     She is on Avalide for BP control, Metoprolol, norvasc  She is in digital Med.   Readings reviewed       She is on Lipitor 80, ASA post Stroke.     For her memory issues, she was taking Aricept.  Saw Neurology in 2021  She has some behavior issues with agitation.   Neuro said Patient has symptoms of bvFTD. Plan to start aricept and may need to use antipsychotics if needed.  Saw Neuro lVadislav Saini in Oct  Namenda 10 mg BID and Donepezil/Aricept 10 mg QHS no side effects. No cognitive declined noted, no recent falls, no syncope noted.  Spouse reports worsening agitation. He reports anxiety has lessened. Tolerating Buspar 10 mg po BID. She did not get follow up with Psychiatry Feb. 13, 2024. Recommend starting LTG 25 mg po BID for agitation. Patient and spouse agree to plan of care. CNP testing not completed.     Previously seeing Psy for Major depressive disorder  Previously on lexapro, Buspar, Vistaril, trazodone  Per previous PCP notes, pt stopped medication herself  Mild CKD with lower eGFR  BP in goal range    Positive DIEGO  Saw Rheum    History of PreDM, however last A1c in normal range    She  reports sleep is not good.  Same complaint of fatigue    History:  Past Medical History:   Diagnosis Date    Chronic kidney disease, stage 3a 04/20/2023    Depression     Hyperlipidemia     Hypertension     Obesity     Stroke     Tobacco dependence      Past Surgical History:   Procedure Laterality Date    CHOLECYSTECTOMY      removal    COLONOSCOPY N/A 11/1/2017    Procedure: COLONOSCOPY;  Surgeon: Francisco Javier Bai MD;  Location: [facility] ENDO;  Service: Endoscopy;  Laterality: N/A;    COLONOSCOPY N/A 6/26/2023    Procedure: COLONOSCOPY;  Surgeon: Vicki Krishna MD;  Location: [facility] ENDO;  Service: Endoscopy;  Laterality: N/A;    HYSTERECTOMY      MYOMECTOMY      removal    OOPHORECTOMY      TUBAL LIGATION       Family History   Problem Relation Name Age of Onset    Stroke Mother      Hypertension Mother      Hypertension Father      Heart disease Father      Cancer Sister      Diabetes Neg Hx       Social History     Socioeconomic History    Marital status:    Tobacco Use    Smoking status: Every Day     Current packs/day: 1.00     Average packs/day: 1 pack/day for 51.0 years (51.0 ttl pk-yrs)     Types: Cigarettes    Smokeless tobacco: Never   Substance and Sexual Activity    Alcohol use: Not Currently     Comment: occ    Drug use: No    Sexual activity: Not Currently     Partners: Male     Social Determinants of Health     Financial Resource Strain: Medium Risk (12/21/2023)    Overall Financial Resource Strain (CARDIA)     Difficulty of Paying Living Expenses: Somewhat hard   Food Insecurity: Food Insecurity Present (12/21/2023)    Hunger Vital Sign     Worried About Running Out of Food in the Last Year: Sometimes true     Ran Out of Food in the Last Year: Never true   Transportation Needs: No Transportation Needs (12/21/2023)    PRAPARE - Transportation     Lack of Transportation (Medical): No     Lack of Transportation (Non-Medical): No   Physical Activity: Inactive (12/21/2023)    Exercise Vital Sign      Days of Exercise per Week: 0 days     Minutes of Exercise per Session: 0 min   Stress: Stress Concern Present (12/21/2023)    [employer] [place] [employer] - Occupational Stress Questionnaire     Feeling of Stress : Very much   Housing Stability: Low Risk  (12/21/2023)    Housing Stability Vital Sign     Unable to Pay for Housing in the Last Year: No     Number of Places Lived in the Last Year: 1     Unstable Housing in the Last Year: No     Patient Active Problem List   Diagnosis    Essential hypertension    Hyperlipidemia    Vasovagal syncope    Tobacco use disorder    Class 1 obesity due to excess calories in adult    Right sided weakness    Deficit in activities of daily living (ADL)    Cognitive communication disorder    Decreased activity tolerance    Decreased strength of lower extremity    Lacunar infarction    Major depressive disorder with single episode, in full remission    Chronic kidney disease, stage 3a     Review of patient's allergies indicates:   Allergen Reactions    Enalapril Other (See Comments)     Ace cough       The following were reviewed at this visit: active problem list, medication list, allergies, family history, social history, and health maintenance.    Medications:  Current Outpatient Medications on File Prior to Visit   Medication Sig Dispense Refill    albuterol (VENTOLIN HFA) 90 mcg/actuation inhaler Inhale 2 puffs into the lungs every 6 (six) hours as needed for Wheezing. Rescue 18 g 0    amLODIPine (NORVASC) 5 MG tablet Take 1 tablet (5 mg total) by mouth once daily. 90 tablet 1    aspirin 81 MG Chew Take 1 tablet (81 mg total) by mouth once daily. 90 tablet 3    atorvastatin (LIPITOR) 80 MG tablet Take 1 tablet (80 mg total) by mouth nightly. 90 tablet 3    busPIRone (BUSPAR) 5 MG Tab Take 2 tablets (10 mg total) by mouth 2 (two) times daily. 120 tablet 11    donepeziL (ARICEPT) 10 MG tablet Take 1 tablet (10 mg total) by mouth every evening. Take one half tablet  for one week then as prescribed. 90 tablet 3    irbesartan-hydrochlorothiazide (AVALIDE) 150-12.5 mg per tablet Take 1 tablet by mouth once daily. 90 tablet 2    lamoTRIgine (LAMICTAL) 25 MG tablet Take 2 tablets (50 mg total) by mouth 2 (two) times daily. 120 tablet 5    memantine (NAMENDA) 10 MG Tab Take 1 tablet (10 mg total) by mouth 2 (two) times daily. 60 tablet 11    multivitamin with minerals tablet        No current facility-administered medications on file prior to visit.       Medications have been reviewed and reconciled with patient at this visit.  Barriers to medications reviewed with patient.    Adverse reactions to current medications reviewed with patient..    Over the counter medications reviewed and reconciled with patient.    Exam:  Wt Readings from Last 3 Encounters:   05/01/24 72 kg (158 lb 11.7 oz)   03/18/24 72.6 kg (160 lb 0.9 oz)   02/13/24 72.6 kg (160 lb 0.9 oz)     Temp Readings from Last 3 Encounters:   05/01/24 96.8 °F (36 °C)   03/18/24 97.2 °F (36.2 °C) (Tympanic)   01/18/24 97.9 °F (36.6 °C) (Tympanic)     BP Readings from Last 3 Encounters:   05/01/24 110/60   03/18/24 108/62   02/13/24 131/65     Pulse Readings from Last 3 Encounters:   05/01/24 80   03/18/24 82   02/13/24 (!) 54     Body mass index is 28.12 kg/m².      Review of Systems   Constitutional: Negative.  Negative for chills and fever.   HENT: Negative.  Negative for congestion, sinus pain and sore throat.    Eyes:  Negative for blurred vision and double vision.   Respiratory:  Negative for cough, sputum production, shortness of breath and wheezing.    Cardiovascular:  Negative for chest pain, palpitations and leg swelling.   Gastrointestinal:  Negative for abdominal pain, constipation, diarrhea, heartburn, nausea and vomiting.   Genitourinary: Negative.    Musculoskeletal: Negative.    Skin: Negative.  Negative for rash.   Neurological: Negative.    Endo/Heme/Allergies: Negative.  Negative for polydipsia. Does not  bruise/bleed easily.   Psychiatric/Behavioral:  Positive for memory loss. Negative for depression and substance abuse.      Physical Exam  Nursing note reviewed.   Cardiovascular:      Rate and Rhythm: Normal rate and regular rhythm.   Pulmonary:      Effort: Pulmonary effort is normal. No respiratory distress.   Neurological:      General: No focal deficit present.      Mental Status: She is alert. Mental status is at baseline.   Psychiatric:         Mood and Affect: Mood normal.         Laboratory Reviewed ({Yes)  Lab Results   Component Value Date    WBC 7.29 04/20/2023    HGB 12.5 04/20/2023    HCT 40.1 04/20/2023     04/20/2023    CHOL 146 04/20/2023    TRIG 123 04/20/2023    HDL 46 04/20/2023    ALT 23 04/20/2023    AST 17 04/20/2023     10/23/2023    K 3.5 10/23/2023     10/23/2023    CREATININE 0.8 10/23/2023    BUN 19 10/23/2023    CO2 26 10/23/2023    TSH 1.511 10/26/2023    INR 1.0 10/27/2022    HGBA1C 5.4 04/20/2023       Rosa Isela was seen today for fatigue.    Diagnoses and all orders for this visit:    Annual physical exam  -     CBC Auto Differential; Future  -     Comprehensive Metabolic Panel; Future  -     Lipid Panel; Future  HM reviewed  Memory loss  WIll follow up Neurology  Hyperlipidemia, unspecified hyperlipidemia type  On on statin    Essential hypertension  -     Comprehensive Metabolic Panel; Future  -     Lipid Panel; Future  Digital HTN  Cigarette nicotine dependence without complication  -     CT Chest Lung Screening Low Dose; Future    Lacunar infarction  Statin, ASA  Chronic kidney disease, stage 3a  -     Comprehensive Metabolic Panel; Future  -     Microalbumin/Creatinine Ratio, Urine; Future  No NSAID  Deficit in activities of daily living (ADL)    Postmenopausal  -     DXA Bone Density Axial Skeleton 1 or more sites; Future    Prediabetes  -     Hemoglobin A1C; Future  Improved  Screening mammogram, encounter for  -     Mammo Digital Screening Bilat w/ Tobi;  Future    Abnormal finding of blood chemistry, unspecified  -     CBC Auto Differential; Future      Will get with Psych for another follow up  Moving which has added stress        Visit today included increased complexity associated with the care of the episodic problem fatigue , which was addressed while instituting co-management of the longitudinal care of the patient due to the serious and/or complex managed problem(s) .    I have evaluated and discussed management associated with medical care services that serve as the continuing focal point for all needed health care services and/or with medical care services that are part of ongoing care related to my patient's single, serious condition or a complex condition(s).    I am providing ongoing care and I am the primary care provider for this patient, and they are being managed, monitored, and/or observed for their chronic conditions over time.     I have addressed their ongoing health maintenance requirements and needs for all health care services and reviewed co-management plans provided by specialty providers when available.    Health Maintenance Due   Topic Date Due    LDCT Lung Screen  Never done    RSV Vaccine (Age 60+ and Pregnant patients) (1 - 1-dose 60+ series) Never done    Shingles Vaccine (2 of 2) 10/05/2021    DEXA Scan  03/29/2024    Hemoglobin A1c (Prediabetes)  04/20/2024    Mammogram  07/07/2024       Care Plan/Goals: Reviewed    Goals          Patient Stated      Blood Pressure < 140/90 (pt-stated)        Other      Exercise at least 150 minutes per week.       LDL CHOLESTEROL < 100       Take at least one BP reading per week at various times of the day             Follow up: Follow up in about 6 months (around 11/1/2024).    After visit summary was printed and given to patient upon discharge today.  Patient goals and care plan are included in After Visit Summary.

## 2024-05-03 ENCOUNTER — TELEPHONE (OUTPATIENT)
Dept: INTERNAL MEDICINE | Facility: CLINIC | Age: 74
End: 2024-05-03
Payer: MEDICARE

## 2024-05-03 DIAGNOSIS — N18.31 CHRONIC KIDNEY DISEASE, STAGE 3A: Primary | ICD-10-CM

## 2024-05-03 NOTE — TELEPHONE ENCOUNTER
----- Message from Hunter Patricia sent at 5/3/2024  8:00 AM CDT -----  Regarding: Urine Sample  Patient unable to provide a urine sample at time of lab appointment. Please reorder and reschedule.

## 2024-05-05 ENCOUNTER — PATIENT MESSAGE (OUTPATIENT)
Dept: NEUROLOGY | Facility: CLINIC | Age: 74
End: 2024-05-05
Payer: MEDICARE

## 2024-05-17 ENCOUNTER — PATIENT MESSAGE (OUTPATIENT)
Dept: INTERNAL MEDICINE | Facility: CLINIC | Age: 74
End: 2024-05-17
Payer: MEDICARE

## 2024-05-20 ENCOUNTER — PATIENT MESSAGE (OUTPATIENT)
Dept: NEUROLOGY | Facility: CLINIC | Age: 74
End: 2024-05-20
Payer: MEDICARE

## 2024-05-20 DIAGNOSIS — F02.811 MAJOR NEUROCOGNITIVE DISORDER DUE TO ANOTHER MEDICAL CONDITION, WITH AGITATION: ICD-10-CM

## 2024-05-20 DIAGNOSIS — R41.841 COGNITIVE COMMUNICATION DISORDER: ICD-10-CM

## 2024-05-20 DIAGNOSIS — I63.81 LACUNAR INFARCTION: ICD-10-CM

## 2024-05-20 DIAGNOSIS — R53.1 RIGHT SIDED WEAKNESS: ICD-10-CM

## 2024-05-20 DIAGNOSIS — R41.3 OTHER AMNESIA: ICD-10-CM

## 2024-05-20 DIAGNOSIS — R41.3 MEMORY LOSS: ICD-10-CM

## 2024-05-20 DIAGNOSIS — F32.5 MAJOR DEPRESSIVE DISORDER WITH SINGLE EPISODE, IN FULL REMISSION: ICD-10-CM

## 2024-05-21 DIAGNOSIS — R41.3 MEMORY LOSS: ICD-10-CM

## 2024-05-21 DIAGNOSIS — R41.841 COGNITIVE COMMUNICATION DISORDER: Primary | ICD-10-CM

## 2024-05-21 RX ORDER — MEMANTINE HYDROCHLORIDE 10 MG/1
10 TABLET ORAL 2 TIMES DAILY
Qty: 28 TABLET | Refills: 0 | Status: SHIPPED | OUTPATIENT
Start: 2024-05-21 | End: 2024-06-04

## 2024-05-21 RX ORDER — MEMANTINE HYDROCHLORIDE 10 MG/1
10 TABLET ORAL 2 TIMES DAILY
Qty: 180 TABLET | Refills: 3 | Status: SHIPPED | OUTPATIENT
Start: 2024-05-21 | End: 2025-05-16

## 2024-06-06 ENCOUNTER — PATIENT MESSAGE (OUTPATIENT)
Dept: ADMINISTRATIVE | Facility: CLINIC | Age: 74
End: 2024-06-06
Payer: MEDICARE

## 2024-06-10 ENCOUNTER — PATIENT MESSAGE (OUTPATIENT)
Dept: INTERNAL MEDICINE | Facility: CLINIC | Age: 74
End: 2024-06-10
Payer: MEDICARE

## 2024-06-11 ENCOUNTER — PATIENT MESSAGE (OUTPATIENT)
Dept: ADMINISTRATIVE | Facility: OTHER | Age: 74
End: 2024-06-11
Payer: MEDICARE

## 2024-07-04 ENCOUNTER — PATIENT MESSAGE (OUTPATIENT)
Dept: INTERNAL MEDICINE | Facility: CLINIC | Age: 74
End: 2024-07-04
Payer: MEDICARE

## 2024-07-04 DIAGNOSIS — F41.9 ANXIETY: ICD-10-CM

## 2024-07-04 DIAGNOSIS — F02.811 MAJOR NEUROCOGNITIVE DISORDER DUE TO ANOTHER MEDICAL CONDITION, WITH AGITATION: ICD-10-CM

## 2024-07-04 DIAGNOSIS — R41.3 MEMORY LOSS: ICD-10-CM

## 2024-07-05 RX ORDER — BUSPIRONE HYDROCHLORIDE 5 MG/1
10 TABLET ORAL 2 TIMES DAILY
Qty: 120 TABLET | Refills: 11 | Status: SHIPPED | OUTPATIENT
Start: 2024-07-05 | End: 2025-07-05

## 2024-07-05 RX ORDER — BUSPIRONE HYDROCHLORIDE 5 MG/1
10 TABLET ORAL 2 TIMES DAILY
Qty: 20 TABLET | Refills: 0 | Status: SHIPPED | OUTPATIENT
Start: 2024-07-05 | End: 2025-07-05

## 2024-07-05 NOTE — TELEPHONE ENCOUNTER
No care due was identified.  [facility] Embedded Care Due Messages. Reference number: [id number].   7/04/2024 8:42:06 PM CDT

## 2024-07-05 NOTE — TELEPHONE ENCOUNTER
Refill Routing Note   Medication(s) are not appropriate for processing by [facility] for the following reason(s):        Outside of protocol    [facility] action(s):  Route               Appointments  past 12m or future 3m with PCP    Date Provider   Last Visit   5/1/2024 Sherrell Drummond MD   Next Visit   11/4/2024 Sherrell Drummond MD   ED visits in past 90 days: 0        Note composed:9:13 AM 07/05/2024

## 2024-07-05 NOTE — TELEPHONE ENCOUNTER
No care due was identified.  [facility] Embedded Care Due Messages. Reference number: [id number].   7/05/2024 10:44:48 AM CDT

## 2024-07-05 NOTE — TELEPHONE ENCOUNTER
Pt is requesting a bridge supply of Buspirone to be sent to [facility] [place]. Please advise.//[id number]    (Pt has a pending prescription for Buspirone mail order in the system.)

## 2024-07-11 ENCOUNTER — OFFICE VISIT (OUTPATIENT)
Dept: NEUROLOGY | Facility: CLINIC | Age: 74
End: 2024-07-11
Payer: MEDICARE

## 2024-07-11 DIAGNOSIS — F33.9 MAJOR DEPRESSIVE DISORDER, RECURRENT EPISODE WITH ANXIOUS DISTRESS: ICD-10-CM

## 2024-07-11 DIAGNOSIS — F41.9 ANXIETY: ICD-10-CM

## 2024-07-11 DIAGNOSIS — Z65.8 PSYCHOSOCIAL STRESSORS: ICD-10-CM

## 2024-07-11 DIAGNOSIS — F32.5 MAJOR DEPRESSIVE DISORDER WITH SINGLE EPISODE, IN FULL REMISSION: ICD-10-CM

## 2024-07-11 DIAGNOSIS — F01.A3 MILD VASCULAR DEMENTIA WITH MOOD DISTURBANCE: Primary | ICD-10-CM

## 2024-07-11 DIAGNOSIS — F02.811 MAJOR NEUROCOGNITIVE DISORDER DUE TO ANOTHER MEDICAL CONDITION, WITH AGITATION: ICD-10-CM

## 2024-07-11 PROCEDURE — 99999 PR PBB SHADOW E&M-EST. PATIENT-LVL I: CPT | Mod: PBBFAC,,, | Performed by: STUDENT IN AN ORGANIZED HEALTH CARE EDUCATION/TRAINING PROGRAM

## 2024-07-11 PROCEDURE — 96133 NRPSYC TST EVAL PHYS/QHP EA: CPT | Mod: S$GLB,,, | Performed by: STUDENT IN AN ORGANIZED HEALTH CARE EDUCATION/TRAINING PROGRAM

## 2024-07-11 PROCEDURE — 90791 PSYCH DIAGNOSTIC EVALUATION: CPT | Mod: S$GLB,,, | Performed by: STUDENT IN AN ORGANIZED HEALTH CARE EDUCATION/TRAINING PROGRAM

## 2024-07-11 PROCEDURE — 96138 PSYCL/NRPSYC TECH 1ST: CPT | Mod: S$GLB,,, | Performed by: STUDENT IN AN ORGANIZED HEALTH CARE EDUCATION/TRAINING PROGRAM

## 2024-07-11 PROCEDURE — 96139 PSYCL/NRPSYC TST TECH EA: CPT | Mod: S$GLB,,, | Performed by: STUDENT IN AN ORGANIZED HEALTH CARE EDUCATION/TRAINING PROGRAM

## 2024-07-11 PROCEDURE — 96132 NRPSYC TST EVAL PHYS/QHP 1ST: CPT | Mod: S$GLB,,, | Performed by: STUDENT IN AN ORGANIZED HEALTH CARE EDUCATION/TRAINING PROGRAM

## 2024-07-11 PROCEDURE — 99999 PR PBB SHADOW E&M-EST. PATIENT-LVL II: CPT | Mod: PBBFAC,,, | Performed by: STUDENT IN AN ORGANIZED HEALTH CARE EDUCATION/TRAINING PROGRAM

## 2024-07-11 NOTE — PROGRESS NOTES
Ms. Pate was seen during this appointment for the testing component of her neuropsychological evaluation. Please see my visit note from this same date of service for documentation of her encounter.        _____________________  Justin Martin, Ph.D.  Neuropsychologist  Department of Neuropsychology  [facility], [place]

## 2024-07-11 NOTE — PROGRESS NOTES
"NEUROPSYCHOLOGY CONSULT    Referral Information  NAME:  Rosa Isela Pate DATE OF SERVICE: 2024   MRN#:  [id number] EDUCATION: 13   AGE: 73 y.o. HANDEDNESS: Right    : 1950 RACE: Black or    SEX: Female REFERRAL: Lacey Thorpe NP;  Neurology, [facility]     Evaluation methods: I had the pleasure of seeing Rosa Isela Pate on 2024 in person at the [facility], Department of Neurology. Data sources for the below report include review of the available medical record, an interview with the patient, a collateral interview with their  with their expressed consent, and administration of a series of neuropsychological tests listed in the Results section of this report. At the outset of the appointment, the undersigned explained the rationale for the evaluation along with the limits of confidentiality; and verbal informed consent for this evaluation was obtained.    The chief complaint/medical necessity leading to consultation/medical necessity is: cognitive decline    NEUROPSYCHOLOGICAL EVALUATION - CONFIDENTIAL    SUMMARY/TREATMENT PLAN   Summary of History:  Ms. Pate is a 73 y.o., right-handed, Black or , woman with 13 years of formal education. She was referred by her neurology nurse practitioner due to cognitive concerns in the context of major neurocognitive disorder thought due to cerebrovascular disease and a history of depression, for which she is currently seeing Migdalia Grady LCSW for therapy. Ms. Pate presented to [facility] on 2020 at which time brain MRI revealed a "nonhemorrhagic subacute infarct in the anterior superior left thalamus, as well as atrophy and chronic microangiopathy." Medical history is additionally notable for controlled hypertension, controlled hyperlipidemia, chronic kidney disease, and vasovagal syncope. Cognitive screening completed by her referring neurology team on 10/26/2023 was below " normal limits (MoCA = 23/30). Most-recent neurologic exam completed on 01/24/2024 was documented as reflecting subtly-suppressed bilateral achilles reflexes. Most-recent brain MRI completed on 11/15/2023 was documented as new development of a tiny focus of encephalomalacia in the left centrum semiovale, superimposed on unchanged such foci within the right cerebellum, superior vermis, and left thalamus. Minor chronic ischemic changes and generalized atrophy were also found. Most-recent laboratory studies drawn on 05/01/2024 were within normal limits.    During interview, Ms. Pate and her  reported the immediate onset and subsequently stable or variable course of cognitive concerns beginning at the time of her stroke in 10/2020; they discussed that depression and anxiety worsen symptoms day-to-day in particular along with poor sleep. Ms. Pate and her  characterized that difficulties with memory for recent information has been her most-troubling cognitive symptom since her stroke; they also discussed difficulties with attention, information processing speed, expressive language, and executive functions. Ms. Pate receives assistance with managing her schedule, medical care, medications, and finances due to her above cognitive concerns. She remains independent and is reportedly effective in driving and self-care activities; her  has typically been the cook in their household during their 40+ year marriage.      Emotionally, Ms. Pate discussed current symptoms meeting criteria for a major depressive episode including the following symptoms most of the day nearly every day over the past 4 months, exacerbated by multiple ongoing life stressors: depressed mood, avolition, anhedonia, frequent thoughts of death without suicidal ideation, feelings of guilt, and mild psychomotor retardation. She reported concern for weight loss, though records indicate her weight has remained stable. She also  "discussed significant anxiety, remarking that she is anxious "all the time" due to multiple life stressors as below. She discussed that over the course of her life she did not have mental health concerns until the death of her twin brother approximately 12 years ago, and that grief and loss in that context provoked more longstanding depression and anxiety symptoms which have been episodic in nature.     Test Results:    Girard Findings:   Ms. Pate is a woman of low-average baseline cognitive functioning on the basis of a combined metric including demographic data and her performance on a single-word reading task.  Performances in the following areas were within normal limits, suggesting intact cognitive functioning in relevant domains:  Information processing speed (personal and areas of normative strength)  Attention and working memory  Visuospatial skills  Executive functions  Expressive language  Fine motor dexterity  Performances in the following areas were below normal limits, suggesting weakness or decline in relevant domains:  Learning, retention, recall, and recognition for verbal information  Learning and recall for visual information with benefit from reminder cues  Receptive language (variable).  Ms. Pate's  endorsed clinically-significant declines in ADL functioning in the following domains on a standard caregiver questionnaire: household care, employment & recreation, shopping & money, travel, and communication  Ms. Pate's  endorsed moderately severe symptoms of agitation, depression, disinhibition, and irritability; and mild symptoms of anxiety and apathy.  On screening measures of depression and anxiety, Ms. Pate endorsed mild depression; she did not endorse clinically significant anxiety.    Data Synthes: Ms. Pate's pattern of verbal greater than visual memory deficits is consistent with her history of left anterior thalamic stroke. Low scores on a receptive language test are " likely an aberrant score given behavioral observations below.     Diagnostic Considerations: Ms. Pate has evidence for clinically significant cognitive decline relative to her baseline. In this context, there is evidence for functional decline due to her cognitive changes on the basis of interview and report of her caregiver on a standard inventory. As such, she qualifies for a major neurocognitive disorder diagnosis. Although clinically significant mild depression and psychosocial stressors are present, these factors would not alone explain her cognitive test performances.     Etiologic Considerations: Given the location of Ms. Pate's infarct and absence of progressive worsening, concern is reduced for a co-occurring process such as Alzheimer's disease that may better explain her memory loss. Ongoing monitoring may increase certainty and help update her treatment plan.     Diagnoses  1. Mild vascular dementia with mood disturbance        2. Major depressive disorder, recurrent episode with anxious distress        3. Psychosocial stressors             Provider Recommendations:   Ms. Pate will be encouraged to follow up with her referring provider in order to integrate these findings into the overall context of her clinical care, and to implement any of the below recommendations as appropriate.     Continued close management of Ms. Pate's cerebrovascular risk factors will be important moving forward.     Ms. Pate may benefit from continued work with he psychiatry team. Given memory loss, consider behavioral strategies and over-training of skills for her to use at home.     Consider augmenting Ms. Pate's medication management for depression.    Neuropsychological assessment supports the merit of Aricept and Namenda, already prescribed for management of cognitive changes.     Repeat assessment may be helpful in 12 months, in order to assess for changes over time and update her treatment plan. Scores from  this assessment should be used as a baseline for comparison at that time.     Patient Recommendations:  The next step in your care is to follow up with your referring provider in order to help with continued management of your care. The below recommendations may help you and your family compensate for your difficulties and better understand the reasons for your cognitive changes.     At this time the single most important thing you can do to reduce your risk of future cognitive decline is to reduce your risk of stroke. Follow with your primary care and neurology teams for management of medical risk factors.     Your medications for management of cognitive change appear appropriate at this time.     Psychotherapy remains appropriate at this time, and I am glad to see you have started working with Migdalia Grady.     For management of stress/anxiety, I recommend daily practice of relaxation strategies (e.g., deep breathing, progressive muscle relaxation, mindfulness), the following are just a few good examples:   The smartphone fauzia [id number] can help guide you through a deep breathing exercise that may help with anxiety.   There are also excellent free videos for guided meditation, muscle relaxation, and mindfulness on [employer] or other video platforms.   I recommend trying some and finding something that works. For any of these skills, they only work if you practice them regularly.   I recommend first choosing one skill and practicing it ten minutes a day when you do not feel anxious or distressed. Then you can use these skills when you need them.     There are steps you can take to improve your long-term brain health and reduce your risk for cognitive decline in the future. I encourage you to follow the recommendations in your daily life:  Engage in physical activity (i.e., something that gets your heart rate up) totaling at least 15-30 minutes per day. Regular exercise is very important for both long-term  "health and stress management. Walking, hiking, elliptical, stationary biking, dancing, and yoga are all good options.  Work closely with your doctor(s) to manage any vascular conditions such as high blood pressure and high cholesterol. Follow the management guidelines from the American Heart Association at www.heart.org.  Remain mentally engaged by keeping socially active, reading, learning new skills, playing games, or participating in a hobby.  Get a good night's sleep by avoiding screens 30-60 minutes before bed and sticking to a regular sleep schedule.  Eat a balanced, heart-healthy diet that is low in salt, saturated fats, and added sugar. The Mediterranean diet has been shown to be especially healthy; studies show that it may reduce the risk of developing dementia and slow the rate of age-related cognitive decline.      Repeat assessment is indicated in 12 months, or sooner in the event that you or your family notice significant cognitive or functional declines. At that time scores from this assessment should be used as a baseline for comparison.      Ms. Pate will be provided the results of the evaluation.     Thank you for allowing me to participate in Ms. Christiansen care.  If you have any questions, please contact me at [phone].        _____________________  Justin Martin, Ph.D.  Neuropsychologist  Department of Neuropsychology  [facility], [place]    HISTORY OF PRESENT ILLNESS: Ms. Rosa Isela Pate is an 73 y.o., right-handed, -American female with 13 years of education who was referred for a neuropsychological evaluation in the setting of major neurocognitive disorder thought due to cerebrovascular disease.    Onset and Course of Cognitive Concerns: Immediate, beginning at the time she sustained a stroke in 12/2020 . Ms. Pate reported that her cognitive skills have been variable from day to day. Poor sleep and stress worsen symptoms. She discussed "I just forget a lot." " "    Characterization of Cognitive Concerns  Attention/ working memory: Ms. Pate reported difficulties with focus, concentration, sustaining attention, and walking into a room and forgetting why .  Processing speed: Ms. Pate reported difficulties with slowed thinking speed.  Language: Ms. Pate discussed some word-finding difficulties and noted that her sister has begun to correct her word choice often because it's hard for her to get her words out.  Visual-spatial/ navigation: Ms. Pate discussed that she may forget where she is when goes familiar places.   Psychomotor: Ms. 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 .  Memory: Ms. Pate discussed forgetfulness for recent information is her most-common and most concerning symptom. She and her  discussed that reminders are often helpful.   Executive Functions: Ms. Pate reported difficulties with difficulty problem-solving, difficulty shifting between tasks, and making poor choices.  Orientation: Ms. Pate reported that it is Wednesday, 7/10/2022, corrected to 2024 with a nod from her .    Neuropsychiatric Symptoms:  Hallucinations: Denied  Delusional/Paranoid Thinking: Denied  Apathy: Denied  Irritability/Agitation: Endorsed related to anxiety.   Disinhibition: She discussed some disinhibition, and recent cursing which is atypical of her.   Depression/Labile Mood: Endorsed depressed mood most of the day nearly every day for the last two to three months; along with avolition, anhedonia, guilt, slow moving particularly in the mornings, weight loss of 10 lbs in the last 2-3 months, frequent thoughts of death and dying without thoughts of suicide,   Anxiety: Endorsed anxiety "all the time." She discussed that her children's stress and interpersonal difficulties with her  are related to anxiety.    Stressors: She discussed that she feels overwhelmed, and that she has a lot on her mind, particularly with regard to her children who are " going through a lot of life turmoil at this time. She also discussed that her sister  in December which has led to worsening of mood, her service was 1.5 months ago in [place], which also worsened depression. She also discussed her  has cardiac complications and a recent heart surgery which is a stressor.     DAILY FUNCTIONING:  Living Environment: She lives with her  and two children.     BASIC ADLS:  Feeding: independent  Dressing: independent  Bathing: independent  Toileting: independent, though she discussed that she can't hold her bladder as she used to.     IADLS:  Support System: Her .  Appointment Management: Her  manages this due to forgetting.   Medication Compliance: Her  manages with the use of a pill-box due to memory errors.   Financial Management: Her sister has managed her bill-paying since her stroke.   Cooking: Her  has typically managed the cooking.   Driving: She continues to drive reportedly without difficulties.       BRAIN HEALTH RISK FACTORS:  Hearing Loss: Denied thought she uses the speaker phone in public.  Vision Loss: Denied   Physical Activity: She walks though the summer heat has reduced the frequency.   Social Isolation: Endorsed  Falls: Endorsed most recently 6-7 months ago when climbing a stepladder in her apartment. Her  also discussed prior instances of dripping and falling due to right foot drag.   Sleep: She denied REM sleep behaviors. She denied loud snoring, awakening gasping for breath, or headaches. She sleeps between 6-10 hours a night. She is sleeping 5-6 hours a night now due to her grandchild living with them.     MEDICAL HISTORY: Ms. Pate  has a past medical history of Chronic kidney disease, stage 3a (2023), Depression, Hyperlipidemia, Hypertension, Obesity, Stroke, and Tobacco dependence.      NEUROIMAGING:  Results for orders placed or performed during the hospital encounter of 11/15/23   MRI Brain Without  Contrast    Narrative    EXAMINATION:  MRI BRAIN WITHOUT CONTRAST    CLINICAL HISTORY:  Memory loss;.  Other amnesia    TECHNIQUE:  Multiplanar multisequence MR imaging of the brain was performed without contrast.    COMPARISON:  CTA head and neck 10/27/2022; head CT 05/30/2022; MRI brain 04/23/2021    FINDINGS:  Intracranial compartment:    Prominence of the ventricular system likely related to generalized cerebral atrophy.  No extra-axial blood or fluid collections.  The cerebellar tonsils are in expected location.  The visualized portions of the sella appear within normal limits.    No intracranial restricted diffusion.  Small foci of encephalomalacia within the right cerebellum, superior vermis and left thalamus appear similar to the prior.  Tiny focus of encephalomalacia in the left centrum semiovale appears new as compared to the prior.  The brain parenchyma demonstrates no edema, mass or mass effect.  There are few scattered areas of periventricular and subcortical T2/FLAIR signal hyperintensities, which are nonspecific most suggestive of minor chronic microvascular ischemic change.  No abnormal gradient susceptibility artifact.  No significant medial temporal lobe atrophy.    Normal vascular flow voids are preserved.    Skull/extracranial contents (limited evaluation): Paranasal sinuses and bilateral mastoid air cells are clear.  Globes and orbits appear within normal limits.    Marrow signal is within normal limits.      Impression    No acute intracranial abnormality.    Interval appearance of a tiny, remote left centrum semiovale infarct as compared to the prior MRI 04/23/2021.    Unchanged small remote infarcts of the left thalamus, right cerebellum and vermis.    Minor chronic microvascular ischemic changes.      Electronically signed by: Michael Cuenca  Date:    11/15/2023  Time:    11:48   Results for orders placed or performed during the hospital encounter of 05/30/22   CT Head Without Contrast     Narrative    EXAMINATION:  CT HEAD WITHOUT CONTRAST    CLINICAL HISTORY:  Syncope, recurrent;    TECHNIQUE:  Low dose axial CT images obtained throughout the head without intravenous contrast. Sagittal and coronal reconstructions were performed.    COMPARISON:  Brain MRI 04/23/2021    FINDINGS:  Intracranial compartment:    Ventricles and sulci are normal in size for age without evidence of hydrocephalus. No extra-axial blood or fluid collections.    Mild microvascular ischemic change.  Left thalamic remote lacunar infarct.  No parenchymal mass, hemorrhage, edema or major vascular distribution infarct.    Skull/extracranial contents (limited evaluation): No fracture. Mastoid air cells and paranasal sinuses are essentially clear.      Impression    No acute intracranial CT abnormality.  Clinical correlation and further evaluation as warranted.    All CT scans at this facility are performed  using dose modulation techniques as appropriate to performed exam including the following:  automated exposure control; adjustment of mA and/or kV according to the patients size (this includes techniques or standardized protocols for targeted exams where dose is matched to indication/reason for exam: i.e. extremities or head);  iterative reconstruction technique.      Electronically signed by: Tonny Garcia  Date:    05/30/2022  Time:    19:36       Current medications: Ms. Pate has a current medication list which includes the following prescription(s): amlodipine, aspirin, atorvastatin, buspirone, buspirone, donepezil, irbesartan-hydrochlorothiazide, lamotrigine, memantine, and multivitamin with minerals.     Review of patient's allergies indicates:   Allergen Reactions    Enalapril Other (See Comments)     Ace cough. She doesn't recall this medication or a history of medication allergies/adverse reactions.       PSYCHIATRIC HISTORY: She reported that she first struggled with depression after her twin brother's death 12 years  ago.     SUICIDAL IDEATION:  History: Denied  Current Stressors: Multiple as above  Active Suicidal Ideation:Denied  Plan/Intent: Denied    FAMILY HISTORY: family history includes Alcohol abuse in her brother; Cancer in her sister; Heart disease in her father; Hypertension in her father and mother; Stroke in her mother. She reported family history of stroke in her father as an older adult. Her mother passed away in her 80's or 90's with Alzheimer's disease.     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    Learning Difficulties: Denied except for social studies.    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   Retired: 2021 following her stroke. Due to cognitive changes her employer asked her to leave.     Relationship Status/ Support Network:   : Yes for over 41 years.   : Yes   Children: 3    SUBSTANCE USE:  Alcohol: Denied  Recreational Substances: Denied  Tobacco Products: She smoked 1/2 PPD and has smoked on and off since age 18. She is in the process of quitting.     MENTAL STATUS AND OBSERVATIONS:  APPEARANCE: Casually dressed and adequate grooming/hygiene.   ALERTNESS/ORIENTATION: Attentive and alert. Ms. Pate was fully oriented to person, place, and situation; and roughly to time, stating the date as Wednesday, 07/10/2022 (self-corrected to 2024 after a prompt from her ). She volunteered that her birthday was coming up soon, though stated initially that she would be turning 76.   GAIT/MOTOR: Ms. Pate was unsteady arising from a chair, though walked independently without cass gait abnormality.  She had subtle ideomotor and ideational praxis errors on informal 'bedside' exam.   SPEECH/LANGUAGE: Normal in rate, rhythm, tone, and volume. Expressive and receptive language were grossly  intact.  MOOD/AFFECT: Mood was anxious and depressed, and affect was mood-congruent. She demonstrated appropriate range of affect and engaged in humor appropriately.   INTERPERSONAL BEHAVIOR: Rapport was quickly and easily established.  THOUGHT PROCESSES: Thoughts seemed logical and goal-directed.   TESTING OBSERVATIONS: Ms. Pate did all that was asked of her without complaint or criticism. However, she discussed significant stress consistent with disclosures during interview and evidenced some tearfulness at the outset of testing related to her life stressors. Ms. Pate occasionally made paraphasic errors or produced neologisms during testing. On a receptive language task, impulsivity appeared to lead to atypical errors on a simple receptive language task.     RESULTS     Tests Administered (manual norms used unless otherwise indicated): [employer] - Test of Premorbid Functioning (TOPF), Dot Counting Test (DCT), Wechsler Adult Intelligence Scale 4th Ed. (WAIS-IV) select subtests, Controlled Oral Word Association Test (COWAT; MOAANS norms), Semantic Fluency (Animals, Fruits, Vegetables; MOAANS norms), Neuropsychological Assessment Battery (NAB) Naming and Auditory Comprehension , Marcano Verbal Learning Test, Revised (HVLT-R), Wechsler Memory Scale, 4th Ed. Logical Memory (WMS IV-LM), Brief Visuospatial Memory Test, Revised (BVMT-R), Trail Making Test (TMT A/B; MOAANS norms), Grooved Pegboard Test ([facility] norms), Activities of Daily Living Questionnaire (ADL-Q; caregiver form), Neuropsychiatric Inventory (NPI-Q; caregiver form) , Geriatric Depression Scale (GDS) and Antonio Anxiety Inventory (DAVONTE).    Score Label T-Score Standard Score Z-Score Scaled Score %ile Rank   Exceptionally High > 70 > 130 > 2.0  > 16 > 98   Above Average 64-69 120-129 1.4-1.9 15 91-97   High Average 57-63 110-119 0.7-1.3 12-14 75-90   Average 44-56  0.6 to -0.6 8-11 25-74   Low Average 37-43 80-89 -1.3 to -0.7 6-7  9-24   Below Average 30-36 70-79 -2.0 to -1.4 4-5 2-8   Exceptionally Low < 30 < 70  < -2.0 < 4 < 2       Performance Validity: Ms. Pate completed both stand-alone and embedded measures of task engagement. Below-cutoff performance on any one stand-alone measure and/ or any two embedded measures of task engagement is highly unlikely to occur outside the context of poor or inconsistent effort during testing. Ms. Pate scored above predetermined cutoffs on all administered measures of task engagement. As such, there is no evidence to suggest poor or inconsistent engagement and their performance is deemed to be a reasonable reflection of their day-to-day cognitive status.       PREMORBID FUNCTIONING Raw Score Type of Standardized Score Standardized Score Percentile/CP Descriptor   TOPF simple dem. eFSIQ - SS 90 25 Average   TOPF pred. eFSIQ - SS 84 14 Low Average   TOPF simple + pred. eFSIQ - SS 85 16 Low Average   INTELLECTUAL FUNCTIONING Raw Score Type of Standardized Score Standardized Score Percentile/CP Descriptor   WAIS-IV         VCI - SS 78 7 Below Average   KELLEN - SS 88 21 Low Average   WMI - SS 86 18 Low Average   PSI -  87 High Average   FSIQ - SS 88 21 Low Average   LANGUAGE FUNCTIONING Raw Score Type of Standardized Score Standardized Score Percentile/CP Descriptor   WAIS-IV Information 5 ss 4 2 Below Average   TOPF Word Reading 22 SS 82 12 Low Average   NAB Auditory Comprehension 80 Tscore 24 0.5 Exceptionally Low    NAB Auditory Comprehension Colors 13 - - 100 WNL   NAB Auditory Comprehension Shapes 18 - - 1 Exceptionally Low    NAB Auditory Comprehension Colors/Shapes/Numbers 21 - - 100 WNL   NAB Auditory Comprehension Pointing 6 - - 100 WNL   NAB Auditory Comprehension Yes/No 8 - - 23 Low Average   NAB Auditory Comprehension Paper Folding 14 - - 10 Low Average   NAB Naming 28 Tscore 40 16 Low Average   NAB Naming Percent Correct After Semantic Cuing 0 - - 38 Average   NAB Naming Percent Correct  After Phonemic Cuing 33 - - 27 Average   FAS 23 ss 8 25 Average   Category Fluency 38 ss 10 50 Average   VISUOSPATIAL FUNCTIONING Raw Score Type of Standardized Score Standardized Score Percentile/CP Descriptor   WAIS-IV Block Design 24 ss 8 25 Average   BVMT-R Copy 11 - - - WNL   LEARNING & MEMORY Raw Score Type of Standardized Score Standardized Score Percentile/CP Descriptor   HVLT-R         Total Immediate 16 Tscore 36 8 Below Average   Delayed Recall 0 Tscore <20 0.1 Exceptionally Low    Retention % 0 Tscore <20 0.1 Exceptionally Low    Hits 5 - - - -   False Positives 0 - - - -   Discrimination  5 Tscore 21 0.2 Exceptionally Low    WMS-IV Subtests         LM I 15 ss 4 2 Below Average   LM II 0 ss 1 0.1 Exceptionally Low    LM Recognition 14 - - 3-9 Below Average   BVMT-R         IR 11 Tscore 34 5 Below Average   DR 3 Tscore 31 3 Below Average   Discrimination Index 5 - - >16 WNL   ATTENTION/WORKING MEMORY Raw Score Type of Standardized Score Standardized Score Percentile/CP Descriptor   WAIS-IV Digit Span 20 ss 8 25 Average         DS Forward 8 ss 8 25 Average         DS Backward 6 ss 8 25 Average         DS Sequence 6 ss 9 37 Average         Longest Digit Forward 5 - - - -         Longest Digit Backward 3 - - - -         Longest Digit Sequence 5 - - - -   WAIS-IV Arithmetic 10 ss 7 16 Low Average   MENTAL PROCESSING SPEED Raw Score Type of Standardized Score Standardized Score Percentile/CP Descriptor   WAIS-IV Symbol Search 27 ss 11 63 Average   WAIS-IV Coding 77 ss 15 95 Above Average   TMT A  37 ss 13 84 High Average   TMT A errors 1 - - - -   EXECUTIVE FUNCTIONING Raw Score Type of Standardized Score Standardized Score Percentile/CP Descriptor   TMT B 157 ss 10 50 Average   TMT B errors 1 - - - -   WAIS-IV Similarities 18 ss 8 25 Average   WAIS-IV Matrix Reasoning 8 ss 8 25 Average   FRONTOMOTOR  Raw Score Type of Standardized Score Standardized Score Percentile/CP Descriptor   GPT  Tscore 45 31  Average   GPD  Tscore 47 38 Average   FUNCTIONAL  Raw Score Type of Standardized Score Standardized Score Percentile/CP Descriptor   ADLQ Self Care Activities - Percent - 28 None to Mild   ADLQ Household Care - Percent - 72 Severe Impairment   ADLQ Employment & Recreation - Percent - 78 Severe Impairment   ADLQ Shopping & Money - Percent - 56 Moderate Impairment   ADLQ Travel - Percent - 58 Moderate Impairment   ADLQ Communication - Percent - 60 Moderate Impairment   MOOD & PERSONALITY Raw Score Type of Standardized Score Standardized Score Percentile/CP Descriptor   GDS-30 15 - - - Mild   DAVONTE 13 - - - Minimal   ss = scaled score (mean = 10, SD = 3); SS = standard score (mean = 100, SD = 15); Tscore mean = 50, SD = 10; zscore (mean = 0.00, SD = 1)           7/12/2024     9:45 AM   NPIQ RFS   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 Yes   Delusions Severity 3   Does this patient have hallucinations such as false visions or voices? Avilez she/he seem to hear or see things that are not present? No   Is the patient resistive to help from others at times, or hard to handle? Yes   Agitation Agression Severity 2   Does the patient seem sad or say that he/she is depressed? Yes   Depression/Dysphoria Severity 2   Does the patient become upset when  from you? Does he/she have any other signs of nervousness such as shortness of breath, sighing, being unable tor elax, or feeling excessively tense? Yes   Anxiety Severity 1   Does the patient appear to feel good or act excessively happy? No   Does this patient seem less interested in his/her usual activities or in the activities and plans of others? Yes   Apathy/Indifference Severity 1   Does this patient seem to act cumpolsively, for example, talking to strangers as if she/he knows them, or saying things that may hurt people's feelings?  Yes   Dis-inhibition Severity 2   Is the patient impatient and cranky? Does he/she have difficulty coping with delays or waiting for planned activities? Yes   Irritability/Liability Severity 2   Irritability/Liability Distress 4   Does the patient engage in repetitive activities such as pacing around the house, handling buttons, wrapping string, or doing other things repeatedly? No   Does this patient awaken you during the night, rise too early in the morning, or take excessive naps during the day? No   Has the patient lost or gained weight, or had a change in the type of food he/she likes? No   NPI Total Severity Score 13   NPI Total Distress Score 4       Billing Documentation     Time spent conducting face to face interview with the patient: 48 minutes; [id number].  Time on review of neuropsychological test data and relevant records, data interpretation, providing feedback about these results, and writing/ documentation: 212 minutes; [id number] &[id number] (x3).  Psychometrist time spent in the administration and scoring of 2 or more neuropsychological tests 225 minutes; 31300 & 99290 (x6).     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

## 2024-07-22 PROBLEM — F01.A3 MILD VASCULAR DEMENTIA WITH MOOD DISTURBANCE: Status: ACTIVE | Noted: 2024-07-22

## 2024-07-24 ENCOUNTER — OFFICE VISIT (OUTPATIENT)
Dept: NEUROLOGY | Facility: CLINIC | Age: 74
End: 2024-07-24
Payer: MEDICARE

## 2024-07-24 DIAGNOSIS — I63.81 LACUNAR INFARCTION: ICD-10-CM

## 2024-07-24 DIAGNOSIS — F32.5 MAJOR DEPRESSIVE DISORDER WITH SINGLE EPISODE, IN FULL REMISSION: ICD-10-CM

## 2024-07-24 DIAGNOSIS — R41.841 COGNITIVE COMMUNICATION DISORDER: Primary | ICD-10-CM

## 2024-07-24 DIAGNOSIS — R53.1 RIGHT SIDED WEAKNESS: ICD-10-CM

## 2024-07-24 DIAGNOSIS — F01.A3 MILD VASCULAR DEMENTIA WITH MOOD DISTURBANCE: Primary | ICD-10-CM

## 2024-07-24 PROCEDURE — 99499 UNLISTED E&M SERVICE: CPT | Mod: S$GLB,,, | Performed by: STUDENT IN AN ORGANIZED HEALTH CARE EDUCATION/TRAINING PROGRAM

## 2024-07-24 PROCEDURE — 99999 PR PBB SHADOW E&M-EST. PATIENT-LVL I: CPT | Mod: PBBFAC,,, | Performed by: STUDENT IN AN ORGANIZED HEALTH CARE EDUCATION/TRAINING PROGRAM

## 2024-07-24 NOTE — PROGRESS NOTES
Ms. Pate attended a in-person feedback session to discuss the results from her recent neuropsychological testing (see report dated 07/11/2024). We discussed her test results, diagnoses, and my recommendations. Ms. Pate voiced her understanding of the information provided, and voiced her intention to follow through on the recommendations provided. All questions were answered to her satisfaction and Ms. Pate was provided with a copy of her report. she was encouraged to contact our office with any future questions or concerns.     Billing Documentation     Time on review of neuropsychological test data and relevant records, data interpretation, providing feedback about these results, and writing/ documentation: 60 minutes; Billed separately.           _____________________  Justin Martin, Ph.D.  Neuropsychologist  Department of Neuropsychology  [facility], [place] '

## 2024-07-30 ENCOUNTER — OFFICE VISIT (OUTPATIENT)
Dept: NEUROLOGY | Facility: CLINIC | Age: 74
End: 2024-07-30
Payer: MEDICARE

## 2024-07-30 DIAGNOSIS — F17.200 TOBACCO USE DISORDER: ICD-10-CM

## 2024-07-30 DIAGNOSIS — F01.A3 MILD VASCULAR DEMENTIA WITH MOOD DISTURBANCE: ICD-10-CM

## 2024-07-30 DIAGNOSIS — M54.9 BACK PAIN, UNSPECIFIED BACK LOCATION, UNSPECIFIED BACK PAIN LATERALITY, UNSPECIFIED CHRONICITY: ICD-10-CM

## 2024-07-30 DIAGNOSIS — F32.5 MAJOR DEPRESSIVE DISORDER WITH SINGLE EPISODE, IN FULL REMISSION: ICD-10-CM

## 2024-07-30 DIAGNOSIS — R45.1 AGITATION: ICD-10-CM

## 2024-07-30 DIAGNOSIS — R41.3 MEMORY LOSS: ICD-10-CM

## 2024-07-30 DIAGNOSIS — G31.84 MCI (MILD COGNITIVE IMPAIRMENT) WITH MEMORY LOSS: Primary | ICD-10-CM

## 2024-07-30 DIAGNOSIS — R53.1 RIGHT SIDED WEAKNESS: ICD-10-CM

## 2024-07-30 DIAGNOSIS — F02.811 MAJOR NEUROCOGNITIVE DISORDER DUE TO ANOTHER MEDICAL CONDITION, WITH AGITATION: ICD-10-CM

## 2024-07-30 DIAGNOSIS — R76.8 ANA POSITIVE: ICD-10-CM

## 2024-07-30 DIAGNOSIS — R41.3 OTHER AMNESIA: ICD-10-CM

## 2024-07-30 DIAGNOSIS — F41.9 ANXIETY: ICD-10-CM

## 2024-07-30 DIAGNOSIS — R41.841 COGNITIVE COMMUNICATION DISORDER: ICD-10-CM

## 2024-07-30 DIAGNOSIS — I63.81 LACUNAR INFARCTION: ICD-10-CM

## 2024-07-30 DIAGNOSIS — Z78.9 DEFICIT IN ACTIVITIES OF DAILY LIVING (ADL): ICD-10-CM

## 2024-07-30 RX ORDER — LAMOTRIGINE 100 MG/1
100 TABLET ORAL 2 TIMES DAILY
Qty: 60 TABLET | Refills: 11 | Status: SHIPPED | OUTPATIENT
Start: 2024-07-30 | End: 2025-07-30

## 2024-07-30 NOTE — PROGRESS NOTES
Subjective:       Patient ID: Rosa Isela Pate is a 74 y.o. female.    Chief Complaint: Major neurocognitive disorder due to another medical condit      Referred by: Dr. Drummond PCP     HPI The patient is here for memory loss.     The patient is presenting with memory changes that began in  2020 after a stroke per spouse. The patient states she was at work and she couldn't recall her password and work related things to do, so her boss instructed her to go to the doctor and she states she was told she had a stroke. She was started on ASA. Stroke risk factors HLD, HTN, Heavy Smoker. The main problems the patient has are related to progressive short term memory loss. For example, the patient would repeat the same question repeatedly. The patient excessively forgets where placed certain things such as keys, unable to recall how much money she has spent, forget things told to her. The patient also started forgetting names such as grand kids names. The patient is driving, she admits to getting lost going to familiar places. No confusion around and inside the house. Has trouble remembering the date and time, keeping up with medications and appointments and patient is able to recall major holidays and unaware of political changes reports that current president is [employer], (However it is President [employer] 2023) . The patient sister is handling finances. The patient is still independent with ADLs. Patient has some agitation, and no aggression. No hallucinations or delusions. No seizures. No language problems. No problems handling tools. No history of headaches. No history of hypothyroidism. No history of alcoholism. Patient is a heavy smoker 10 or more per day, No history of B12 deficiency. Chronic depression. Reports that she is not happy with living situation. She has marital conflict, patient states that her relationship is not what it use to be. No history of bipolar disorder. No history of Untreated Syphilis.  No history of  HIV infection. No toxic exposures.  No history of traumatic brain injury. No tremors or abnormal movements. No falls or instability. Urgency urinary incontinence. Difficulty with sleep and good appetite. No family history of dementia.       INTERVAL HISTORY 07-: Patient present for memory management. Accompanied by spouse. Patient doing well tolerating Namenda 10 mg BID and Donepezil/Aricept 10 mg QHS no side effects. No cognitive declined noted, no recent falls, no syncope noted.  Spouse reports lessening of  agitation, since starting LTG 50 mg po BID. He reports anxiety has lessened. Tolerating Buspar 10 mg po BID. Refer to psychologist for counseling. Increased  mg po BID for agitation. Patient and spouse agree to plan of care. Recommend PT/OT/ ST to evaluate and treat.                The originating site (patient location) is: Home.        The distant site (neurologist location) is: Neurology Clinic at [facility].        The chief complaint leading to consultation is: F/U memory management          Visit type: Virtual visit with synchronous audio and video.        Total time spent with patient: 20 minutes         Special circumstances: This visit occurred during COVID-19 Pandemic Public Health Emergency.         Consent: The patient verbally consented to participating in the video visit and informed that may decline to receive medical services by telemedicine and may withdraw from such care at any time.        I discussed with the patient the nature of our telemedicine visits, that:        I  would evaluate the patient and recommend diagnostics and treatments based on my assessment.     Our sessions are not being recorded and that personal health information is protected.     Our team would provide follow up care in person if/when the patient needs it.     Virtual (video/telemedicine) visits have significant limitations. A telemedicine exam is primarily focused on the history and what I  can observe. Several critical parts of the neurological exam cannot be performed.        Review of Systems   Unable to perform ROS: Dementia   Constitutional: Negative.    HENT: Negative.     Eyes: Negative.    Respiratory: Negative.     Cardiovascular: Negative.    Gastrointestinal: Negative.    Endocrine: Negative.    Genitourinary: Negative.    Musculoskeletal:  Positive for arthralgias.   Skin: Negative.    Allergic/Immunologic: Negative.    Neurological: Negative.    Hematological: Negative.    Psychiatric/Behavioral:  Positive for agitation, confusion, decreased concentration, dysphoric mood and sleep disturbance.                  Current Outpatient Medications:     amLODIPine (NORVASC) 5 MG tablet, Take 1 tablet (5 mg total) by mouth once daily., Disp: 90 tablet, Rfl: 1    aspirin 81 MG Chew, Take 1 tablet (81 mg total) by mouth once daily., Disp: 90 tablet, Rfl: 3    atorvastatin (LIPITOR) 80 MG tablet, Take 1 tablet (80 mg total) by mouth nightly., Disp: 90 tablet, Rfl: 3    busPIRone (BUSPAR) 5 MG Tab, Take 2 tablets (10 mg total) by mouth 2 (two) times daily., Disp: 20 tablet, Rfl: 0    donepeziL (ARICEPT) 10 MG tablet, Take 1 tablet (10 mg total) by mouth every evening. Take one half tablet for one week then as prescribed., Disp: 90 tablet, Rfl: 3    irbesartan-hydrochlorothiazide (AVALIDE) 150-12.5 mg per tablet, Take 1 tablet by mouth once daily., Disp: 90 tablet, Rfl: 2    memantine (NAMENDA) 10 MG Tab, Take 1 tablet (10 mg total) by mouth 2 (two) times daily., Disp: 180 tablet, Rfl: 3    multivitamin with minerals tablet, , Disp: , Rfl:     lamoTRIgine (LAMICTAL) 25 MG tablet, Take 2 tablets (50 mg total) by mouth 2 (two) times daily., Disp: 120 tablet, Rfl: 5  Past Medical History:   Diagnosis Date    Chronic kidney disease, stage 3a 04/20/2023    Depression     Hyperlipidemia     Hypertension     Obesity     Stroke     Tobacco dependence      Past Surgical History:   Procedure Laterality Date     CHOLECYSTECTOMY      removal    COLONOSCOPY N/A 11/1/2017    Procedure: COLONOSCOPY;  Surgeon: Francisco Javier Bai MD;  Location: [facility] ENDO;  Service: Endoscopy;  Laterality: N/A;    COLONOSCOPY N/A 6/26/2023    Procedure: COLONOSCOPY;  Surgeon: Vicki Krishna MD;  Location: [facility] ENDO;  Service: Endoscopy;  Laterality: N/A;    HYSTERECTOMY      MYOMECTOMY      removal    OOPHORECTOMY      TUBAL LIGATION       Social History     Socioeconomic History    Marital status:    Tobacco Use    Smoking status: Every Day     Current packs/day: 1.00     Average packs/day: 1 pack/day for 51.0 years (51.0 ttl pk-yrs)     Types: Cigarettes    Smokeless tobacco: Never   Substance and Sexual Activity    Alcohol use: Not Currently     Comment: occ    Drug use: No    Sexual activity: Not Currently     Partners: Male     Social Determinants of Health     Financial Resource Strain: Medium Risk (12/21/2023)    Overall Financial Resource Strain (CARDIA)     Difficulty of Paying Living Expenses: Somewhat hard   Food Insecurity: Food Insecurity Present (12/21/2023)    Hunger Vital Sign     Worried About Running Out of Food in the Last Year: Sometimes true     Ran Out of Food in the Last Year: Never true   Transportation Needs: No Transportation Needs (12/21/2023)    PRAPARE - Transportation     Lack of Transportation (Medical): No     Lack of Transportation (Non-Medical): No   Physical Activity: Inactive (12/21/2023)    Exercise Vital Sign     Days of Exercise per Week: 0 days     Minutes of Exercise per Session: 0 min   Stress: Stress Concern Present (12/21/2023)    [employer] - Occupational Stress Questionnaire     Feeling of Stress : Very much   Housing Stability: Low Risk  (12/21/2023)    Housing Stability Vital Sign     Unable to Pay for Housing in the Last Year: No     Number of Places Lived in the Last Year: 1     Unstable Housing in the Last Year: No             Past/Current Medical/Surgical  History, Past/Current Social History, Past/Current Family History and Past/Current Medications were reviewed in detail.        Objective:           VITAL SIGNS WERE REVIEWED      GENERAL APPEARANCE:     The patient looks comfortable.    Body habitus is normal BMI 28.27 KG     No signs of respiratory distress.    Normal breathing pattern.    No dysmorphic features    Normal eye contact.     GENERAL MEDICAL EXAM:    HEENT:  Head is atraumatic normocephalic.     No tender temporal arteries. Fundoscopic (Ophthalmoscopic) exam showed no disc edema.      Neck and Axillae: No JVD. No visible lesions.    No carotid bruits. No thyromegaly. No lymphadenopathy.    Cardiopulmonary: No cyanosis. No tachypnea. Normal respiratory effort.    Gastrointestinal/Urogenital:  No jaundice. No stomas or lesions. No visible hernias. No catheters.     Abdomen is soft non-tender. No masses or organomegaly.    Skin, Hair and Nails: No pathognonomic skin rash. No neurofibromatosis. No visible lesions.No stigmata of autoimmune disease. No clubbing.    Skin is warm and moist. No palpable masses.    Limbs: No varicose veins. No visible swelling.    No palpable edema. Pulses are symmetric. Pedal pulses are palpable.      Muskoskeletal: No visible deformities.No visible lesions.    No spine tenderness. No signs of longstanding neuropathy. No dislocations or fractures.            Neurologic Exam     Mental Status   Oriented to person.   Oriented to place. Oriented to country.   Disoriented to year, month and date.   Follows 2 step commands.   Attention: decreased. Concentration: decreased.   Speech: speech is normal and not slurred   Level of consciousness: alert  Knowledge: inconsistent with education and poor. Able to perform simple calculations.   Unable to name object. Able to read. Able to repeat. Able to write. Abnormal comprehension.     Cranial Nerves     CN II   Visual fields full to confrontation.   Visual acuity: decreased  Right visual  field deficit: none  Left visual field deficit: none     CN III, IV, VI   Pupils are equal, round, and reactive to light.  Extraocular motions are normal.   Right pupil: Size: 2 mm. Shape: regular. Reactivity: brisk. Consensual response: intact. Accommodation: intact.   Left pupil: Size: 2 mm. Shape: regular. Reactivity: brisk. Consensual response: intact. Accommodation: intact.   CN III: no CN III palsy  CN VI: no CN VI palsy  Nystagmus: none   Diplopia: none  Ophthalmoparesis: none  Upgaze: normal  Downgaze: normal  Conjugate gaze: present  Vestibulo-ocular reflex: present    CN V   Facial sensation intact.   Right facial sensation deficit: none  Left facial sensation deficit: none    CN VII   Right facial weakness: none  Left facial weakness: none    CN VIII   CN VIII normal.   Hearing: intact    CN IX, X   CN IX normal.   CN X normal.   Palate: symmetric    CN XI   CN XI normal.   Right sternocleidomastoid strength: normal  Left sternocleidomastoid strength: normal  Right trapezius strength: normal  Left trapezius strength: normal    CN XII   CN XII normal.   Tongue: not atrophic  Fasciculations: absent  Tongue deviation: none    Motor Exam   Muscle bulk: normal  Overall muscle tone: normal  Right arm tone: normal  Left arm tone: normal  Right arm pronator drift: absent  Left arm pronator drift: absent  Right leg tone: normal  Left leg tone: normal    Strength   Strength 5/5 throughout.   Right neck flexion: 5/5  Left neck flexion: 5/5  Right neck extension: 5/5  Left neck extension: 5/5  Right deltoid: 5/5  Left deltoid: 5/5  Right biceps: 5/5  Left biceps: 5/5  Right triceps: 5/5  Left triceps: 5/5  Right wrist flexion: 5/5  Left wrist flexion: 5/5  Right wrist extension: 5/5  Left wrist extension: 5/5  Right interossei: 5/5  Left interossei: 5/5  Right iliopsoas: 5/5  Left iliopsoas: 5/5  Right quadriceps: 5/5  Left quadriceps: 5/5  Right hamstrin/5  Left hamstrin/5  Right glutei: 5/5  Left glutei:  5/5  Right anterior tibial: 5/5  Left anterior tibial: 5/5  Right posterior tibial: 5/5  Left posterior tibial: 5/5  Right peroneal: 5/5  Left peroneal: 5/5  Right gastroc: 5/5  Left gastroc: 5/5    Sensory Exam   Light touch normal.   Right arm light touch: normal  Left arm light touch: normal  Right leg light touch: normal  Left leg light touch: normal  Vibration normal.   Right arm vibration: normal  Left arm vibration: normal  Right leg vibration: normal  Left leg vibration: normal  Proprioception normal.   Right arm proprioception: normal  Left arm proprioception: normal  Right leg proprioception: normal  Left leg proprioception: normal  Pinprick normal.   Right arm pinprick: normal  Left arm pinprick: normal  Right leg pinprick: normal  Left leg pinprick: normal  Sensory deficit distribution on left: lateral cutaneous thigh  Graphesthesia: normal  Stereognosis: normal    Gait, Coordination, and Reflexes     Gait  Gait: normal    Coordination   Romberg: negative  Finger to nose coordination: normal  Heel to shin coordination: normal  Tandem walking coordination: normal    Tremor   Resting tremor: absent  Intention tremor: absent  Action tremor: absent    Reflexes   Right brachioradialis: 2+  Left brachioradialis: 2+  Right biceps: 2+  Left biceps: 2+  Right triceps: 2+  Left triceps: 2+  Right patellar: 2+  Left patellar: 2+  Right achilles: 1+  Left achilles: 1+  Right plantar: normal  Left plantar: normal  Right Blum: absent  Left Blum: absent  Right ankle clonus: absent  Left ankle clonus: absent  Right pendular knee jerk: absent  Left pendular knee jerk: absent            JOSÉ LUIS COGNITIVE ASSESSMENT (MOCA) TOTAL SCORE      MILD DEMENTIA 20-25    Education Some College       DATE 10-       TOTAL SCORE 23/30       VISUOSPATIAL EXECUTIVE (5) 4       NAMING (3) 3       ATTENTION (6) 6       LANGUAGE (3) 2       ABSTRACTION(2) 2       RECALL (5) 0       ORIENTATION (6) 6           Lab Results    Component Value Date    WBC 5.29 05/01/2024    HGB 12.7 05/01/2024    HCT 40.1 05/01/2024    MCV 96 05/01/2024     05/01/2024     Sodium   Date Value Ref Range Status   05/01/2024 142 136 - 145 mmol/L Final     Potassium   Date Value Ref Range Status   05/01/2024 3.6 3.5 - 5.1 mmol/L Final     Chloride   Date Value Ref Range Status   05/01/2024 106 95 - 110 mmol/L Final     CO2   Date Value Ref Range Status   05/01/2024 27 23 - 29 mmol/L Final     Glucose   Date Value Ref Range Status   05/01/2024 99 70 - 110 mg/dL Final     BUN   Date Value Ref Range Status   05/01/2024 17 8 - 23 mg/dL Final     Creatinine   Date Value Ref Range Status   05/01/2024 1.0 0.5 - 1.4 mg/dL Final     Calcium   Date Value Ref Range Status   05/01/2024 9.8 8.7 - 10.5 mg/dL Final     Total Protein   Date Value Ref Range Status   05/01/2024 7.3 6.0 - 8.4 g/dL Final     Albumin   Date Value Ref Range Status   05/01/2024 3.9 3.5 - 5.2 g/dL Final     Total Bilirubin   Date Value Ref Range Status   05/01/2024 0.5 0.1 - 1.0 mg/dL Final     Comment:     For infants and newborns, interpretation of results should be based  on gestational age, weight and in agreement with clinical  observations.    Premature Infant recommended reference ranges:  Up to 24 hours.............<8.0 mg/dL  Up to 48 hours............<12.0 mg/dL  3-5 days..................<15.0 mg/dL  6-29 days.................<15.0 mg/dL       Alkaline Phosphatase   Date Value Ref Range Status   05/01/2024 60 55 - 135 U/L Final     AST   Date Value Ref Range Status   05/01/2024 33 10 - 40 U/L Final     ALT   Date Value Ref Range Status   05/01/2024 30 10 - 44 U/L Final     Anion Gap   Date Value Ref Range Status   05/01/2024 9 8 - 16 mmol/L Final     eGFR if    Date Value Ref Range Status   05/30/2022 58 (A) >60 mL/min/1.73 m^2 Final     eGFR if non    Date Value Ref Range Status   05/30/2022 51 (A) >60 mL/min/1.73 m^2 Final     Comment:      Calculation used to obtain the estimated glomerular filtration  rate (eGFR) is the CKD-EPI equation.        Lab Results   Component Value Date    [id number] 636 10/26/2023     Lab Results   Component Value Date    TSH 1.511 10/26/2023     10-    UA neg for UTI, trace protein noted recommend increase hydration    DIEGO +, DIEGO Homogenous, DIEGO Titer 1:80, RPR Neg, FA level 17.7, Vitamin B 12 636 NL, HIV neg, HC level 10.1 NL, TSH level 1.511 NL,     Labs WNL except DIEGO+ recommend follow up with Rheumatology for further evaluation     04-    MRI BRAIN WO    Examination suggestive of prior small left thalamus lacunar infarction.       12-    MRI Brain WO    There is age-appropriate atrophy. There are multiple FLAIR hyperintensities throughout the periventricular and subcortical white matter bilaterally.     There is a focus of restricted diffusion in the anterior superior left thalamus measuring approximately 1.6 cm with slight ADC hypointensity consistent with a subacute infarct.     There is no intracranial hemorrhage. There is no midline shift or hydrocephalus.     Normal T2 flow voids are present at the skull base. The vertebral basilar system is diminutive. The visualized paranasal sinuses are well aerated. The orbital soft tissues are normal.    No acute intracranial process seen.      12-    MRA Brain     MRA Brain NL       MRI Brain WO:    11-    No acute intracranial abnormality.     Interval appearance of a tiny, remote left centrum semiovale infarct as compared to the prior MRI 04/23/2021.     Unchanged small remote infarcts of the left thalamus, right cerebellum and vermis.     Minor chronic microvascular ischemic changes.    Reviewed the neuroimaging independently       Assessment:       No diagnosis found.          Modified Zuleika Score (m-RS)      0  The patient has no residual symptoms.    1  The patient has no significant disability; able to carry out all pre-stroke  activities.    2  The patient has slight disability; unable to carry out all pre-stroke activities but able to look after self without daily help.    3  The patient has moderate disability; requiring some external help but able to walk without the assistance of another individual.    4  The patient has moderately severe disability; unable to walk or attend to bodily functions without assistance of another individual.    5  The patient has severe disability; bedridden, incontinent, requires continuous care.    6  The patient has  (during the hospital stay or after discharge from the hospital).    Plan:         MAJOR MILD NEUROCOGNITIVE DISORDER DUE POSSIBLE VASCULAR DEMENTIA OR DUE TO ANOTHER CONDITION WITH AGITATION/ HX OF STROKE MDD/CODIE/ TOBACCO USE/         EVALUATION     Comprehensive Neuropsychological Testing     DriveAble to assess the cognitive aspects of driving.       Follow up with Psychiatry for optimization of CODIE/MDD     Follow up with Psychologist to evaluate and treat    Refer for PT/ST/OT to evaluate           HX OF STROKE (HTN, HLD SMOKER)      ASA 81 mg QD      Vascular Risk Factors (VRFs) stratification (BP, BS, BC control and Smoking Cessation) is the mainstay of stroke prevention (>90%). The benefit of antiplatelets is < 20% stroke risk reduction.         Healthy diet and exercise    Avoid driving.    Call 911 if any SUDDEN:   Weakness  Numbness  Slurring of speech  Speech difficulty   Vertigo  Loss of balance  Loss of vision  Loss of hearing  Double vision  Trouble swallowing  Trouble breathing  Facial drooping      DISEASE-MODIFYING AGENTS     Explained to the patient and family that medications are intended to slow down the progression (in the early stages of the disease) and not stop it or reverse the disease. The disease is relentless, progressive and so far, cannot be controlled. Progression includes Cognitive decline, Behavioral disturbances, and Motor decline as well.     I  counseled the patient and family that the rate of progression is extremely variable, and the average (10 years) is an inaccurate measure.     CLASSES:    NMDA RECEPTOR ANTAGONISTS    Continue memantine/Namenda 10 mg BID      CHOLINESTERASE INHIBITORS (CEI)    Continue donepezil/Aricept 10 mg QHS      If GI symptoms become an issue will try rivastigmine/Exelon patches. Will obtain EKG before and after Aricept to verify effects on QT interval!  (Other formulations Adlarity TTS 5/10 mg weekly). Patches are convenient, bypass the GI system but have unintended consequences of being taken off prematurely or being duplicated accidentally.       MISCELLANEOUS     [facility] study regarding Sildenafil (Viagra) impact on dementia showed possible benefit. Will wait for high quality prospective double-blinded placebo-controlled trials to make any proper recommendations.     Recommended against the use of OTC non-FDA evaluated supplements and claims.         SYMPTOMATIC MANAGEMENT-BEHAVIORAL SYMPTOMS AND NON-COGNITIVE SYMPTOMS       ANTIDEPRESSANTS CLASS MEDICATIONS    Follow up with psychiatry for behavioral management     Increase  mg po BID for agitation     Bupsar 10 mg po BID anxiety symptoms.     HOME CARE    Falling Down Precautions. Gait is affected by the disease as well.     Avoid driving and access to firearms     Incremental 24/Care     Help with finances and decision making.    Join support group.    Proofing the house and use labeling.    Avoid antihistamines and anticholinergics.    Avoid changing routine.    Use written reminders.    Avoid multitasking.    Healthy diet, exercise (physical and cognitive).    Good sleep hygiene.        HERE ARE 10 WAYS TO REDUCE RISK OF DEMENTIA AND SLOW DOWN THE PROGRESSION OF DEMENTIA        1.Be physically active.    2. Avoid smoking and alcohol consumption.    3. Track your numbers. Keep your blood pressure, cholesterol, blood sugar and weight within recommended  ranges.    4. Stay socially connected.    5. Make healthy food choices. Eat a well-balance and healthy diet that rich in cereals, fish, legumes, and vegetables.    6. Reduce stress.     7. Challenge your brain by trying something new, playing games, or learning a new language.    8. Take care of your hearing. Avoid being continuously exposed to loud sounds and wear a hearing aid if hearing does become a problem.    9. Lower risk of falls. Consider installing handrails on all stairs and grab bars in bathrooms.    10. Reduce your exposure to air pollution, such as exposure to exhaust in heavy traffic.         CAREGIVERS COUNSELING     Recommend reading the following books:     The 36-Hour Day and there are many online and printed resources to help caregivers as well.     Alzheimer's Through the Stages.     Dementia with G.R.A.C.E.            PREVENTION OF DELIRIUM       1. Good hydration and avoid electrolyte imbalance  2. Recognize and treat infections immediately especially UTI.  3. Bladder emptying and prevent constipation.   4. Provide stimulating activities and familiar objects  5. Use eyeglasses and hearing aids if needed.   6. Use simple and regular communication about people, current place, and time  7. Mobility and range-of-motion exercises  8. Reduce noise, lighting and avoid sleep interruptions  9. Non-narcotic pain management.  10.Nondrug treatment for sleep problems or anxiety  11. Avoid antihistamines.  12. Avoid narcotics.  13. Avoid benzodiazepines.            HELPFUL STRATEGIES FOR THE FAMILY AND CAREGIVERS        BEHAVIORAL MANAGEMENT STRATEGIES     Remember that you cannot reason with acute psychosis or confusion. Unless there is an immediate safety issue, there is no need to challenge or correct the person.  For example, if a pt is hallucinating, do not argue with the person that what they are seeing is not real. If they are expressing paranoia, empathize gently with their fear, and attempt to  "redirect them to a different activity.   If the person is particularly stuck on a topic or activity, as long as the activity is safe and is not worsening their agitation, you don't need to intervene.     Communicate calmly, simply, and concisely    Reassure the person that they are safe and you are here to help  Try not to express irritation or anger  Speak quietly and calmly, do not shout or threaten the person. Avoid provocation.   Establish verbal contact and provide orientation and reassurance.  Attempt to identify the patient's wants and feelings, listen to what they are saying, and reflect those wants and feelings back to the patient.  Dont use sarcasm as a weapon    Set clear limits on behavior in a calm voice ("You cannot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

## 2024-08-02 ENCOUNTER — PATIENT MESSAGE (OUTPATIENT)
Dept: INTERNAL MEDICINE | Facility: CLINIC | Age: 74
End: 2024-08-02
Payer: MEDICARE

## 2024-08-02 ENCOUNTER — PATIENT MESSAGE (OUTPATIENT)
Dept: NEUROLOGY | Facility: CLINIC | Age: 74
End: 2024-08-02
Payer: MEDICARE

## 2024-08-08 ENCOUNTER — PATIENT MESSAGE (OUTPATIENT)
Dept: NEUROLOGY | Facility: CLINIC | Age: 74
End: 2024-08-08
Payer: MEDICARE

## 2024-08-08 RX ORDER — TRAZODONE HYDROCHLORIDE 50 MG/1
50 TABLET ORAL NIGHTLY
Qty: 14 TABLET | Refills: 0 | Status: SHIPPED | OUTPATIENT
Start: 2024-08-08 | End: 2024-08-22

## 2024-08-08 RX ORDER — TRAZODONE HYDROCHLORIDE 50 MG/1
50 TABLET ORAL NIGHTLY
Qty: 90 TABLET | Refills: 3 | Status: SHIPPED | OUTPATIENT
Start: 2024-08-08 | End: 2024-08-08

## 2024-08-09 RX ORDER — TRAZODONE HYDROCHLORIDE 50 MG/1
50 TABLET ORAL NIGHTLY
Qty: 90 TABLET | Refills: 3 | OUTPATIENT
Start: 2024-08-09 | End: 2025-08-09

## 2024-08-12 DIAGNOSIS — R41.3 MEMORY LOSS: ICD-10-CM

## 2024-08-12 DIAGNOSIS — F41.9 ANXIETY: ICD-10-CM

## 2024-08-12 DIAGNOSIS — F02.811 MAJOR NEUROCOGNITIVE DISORDER DUE TO ANOTHER MEDICAL CONDITION, WITH AGITATION: ICD-10-CM

## 2024-08-12 NOTE — TELEPHONE ENCOUNTER
No care due was identified.  [facility] Embedded Care Due Messages. Reference number: [id number].   8/12/2024 4:31:48 PM CDT

## 2024-08-13 DIAGNOSIS — F32.2 MAJOR DEPRESSIVE DISORDER, SINGLE EPISODE, SEVERE WITHOUT PSYCHOTIC FEATURES: ICD-10-CM

## 2024-08-13 RX ORDER — BUSPIRONE HYDROCHLORIDE 5 MG/1
10 TABLET ORAL 2 TIMES DAILY
Qty: 360 TABLET | Refills: 3 | Status: SHIPPED | OUTPATIENT
Start: 2024-08-13 | End: 2025-08-13

## 2024-08-13 RX ORDER — AMLODIPINE BESYLATE 5 MG/1
5 TABLET ORAL
Qty: 90 TABLET | Refills: 2 | Status: SHIPPED | OUTPATIENT
Start: 2024-08-13

## 2024-08-13 NOTE — TELEPHONE ENCOUNTER
Refill Routing Note   Medication(s) are not appropriate for processing by [facility] for the following reason(s):        Outside of protocol    [facility] action(s):  Route               Appointments  past 12m or future 3m with PCP    Date Provider   Last Visit   5/1/2024 Sherrell Drummond MD   Next Visit   11/4/2024 Sherrell Drummond MD   ED visits in past 90 days: 0        Note composed:11:02 PM 08/12/2024

## 2024-08-14 NOTE — TELEPHONE ENCOUNTER
Refill Decision Note   Rosa Isela Pate  is requesting a refill authorization.  Brief Assessment and Rationale for Refill:  Approve     Medication Therapy Plan:         Comments:     Note composed:10:58 PM 08/13/2024

## 2024-08-14 NOTE — TELEPHONE ENCOUNTER
No care due was identified.  [facility] Embedded Care Due Messages. Reference number: [id number].   8/13/2024 9:49:22 PM CDT

## 2024-09-20 ENCOUNTER — OFFICE VISIT (OUTPATIENT)
Dept: INTERNAL MEDICINE | Facility: CLINIC | Age: 74
End: 2024-09-20
Payer: MEDICARE

## 2024-09-20 VITALS
WEIGHT: 160.25 LBS | DIASTOLIC BLOOD PRESSURE: 66 MMHG | SYSTOLIC BLOOD PRESSURE: 120 MMHG | HEIGHT: 63 IN | HEART RATE: 62 BPM | OXYGEN SATURATION: 96 % | BODY MASS INDEX: 28.39 KG/M2

## 2024-09-20 DIAGNOSIS — E78.5 HYPERLIPIDEMIA, UNSPECIFIED HYPERLIPIDEMIA TYPE: ICD-10-CM

## 2024-09-20 DIAGNOSIS — R20.0 NUMBNESS AND TINGLING: ICD-10-CM

## 2024-09-20 DIAGNOSIS — Z00.00 ENCOUNTER FOR MEDICARE ANNUAL WELLNESS EXAM: Primary | ICD-10-CM

## 2024-09-20 DIAGNOSIS — R20.2 NUMBNESS AND TINGLING: ICD-10-CM

## 2024-09-20 DIAGNOSIS — Z72.0 TOBACCO USE: ICD-10-CM

## 2024-09-20 DIAGNOSIS — F01.A3 MILD VASCULAR DEMENTIA WITH MOOD DISTURBANCE: ICD-10-CM

## 2024-09-20 DIAGNOSIS — F32.0 CURRENT MILD EPISODE OF MAJOR DEPRESSIVE DISORDER, UNSPECIFIED WHETHER RECURRENT: ICD-10-CM

## 2024-09-20 DIAGNOSIS — Z12.31 ENCOUNTER FOR SCREENING MAMMOGRAM FOR BREAST CANCER: ICD-10-CM

## 2024-09-20 DIAGNOSIS — Z59.9 FINANCIAL DIFFICULTIES: ICD-10-CM

## 2024-09-20 DIAGNOSIS — I10 ESSENTIAL HYPERTENSION: ICD-10-CM

## 2024-09-20 DIAGNOSIS — Z59.41 FOOD INSECURITY: ICD-10-CM

## 2024-09-20 PROCEDURE — 99999 PR PBB SHADOW E&M-EST. PATIENT-LVL V: CPT | Mod: PBBFAC,,, | Performed by: NURSE PRACTITIONER

## 2024-09-20 SDOH — SOCIAL DETERMINANTS OF HEALTH (SDOH): FOOD INSECURITY: Z59.41

## 2024-09-20 SDOH — SOCIAL DETERMINANTS OF HEALTH (SDOH): PROBLEM RELATED TO HOUSING AND ECONOMIC CIRCUMSTANCES, UNSPECIFIED: Z59.9

## 2024-09-20 NOTE — PROGRESS NOTES
"  Rosa Isela Pate presented for a  Medicare AWV and comprehensive Health Risk Assessment today. The following components were reviewed and updated:    Medical history  Family History  Social history  Allergies and Current Medications  Health Risk Assessment  Health Maintenance  Care Team         ** See Completed Assessments for Annual Wellness Visit within the encounter summary.**         The following assessments were completed:  Living Situation  CAGE  Depression Screening  Timed Get Up and Go  Whisper Test  Cognitive Function Screening  Nutrition Screening  ADL Screening  PAQ Screening      Opioid documentation:      Patient does not have a current opioid prescription.        Vitals:    09/20/24 0836   BP: 120/66   Pulse: 62   SpO2: 96%   Weight: 72.7 kg (160 lb 4.4 oz)   Height: 5' 3" (1.6 m)     Body mass index is 28.39 kg/m².  Physical Exam  Vitals and nursing note reviewed.   Constitutional:       Appearance: She is well-developed.      Comments: Patient accompanied by , who was present throughout the visit and aided in information gathering.        HENT:      Head: Normocephalic.   Cardiovascular:      Rate and Rhythm: Normal rate and regular rhythm.      Heart sounds: Normal heart sounds.   Pulmonary:      Effort: Pulmonary effort is normal. No respiratory distress.      Breath sounds: Normal breath sounds.   Abdominal:      Palpations: Abdomen is soft. There is no mass.      Tenderness: There is no abdominal tenderness.   Musculoskeletal:         General: Normal range of motion.   Skin:     General: Skin is warm and dry.   Neurological:      Mental Status: She is alert and oriented to person, place, and time.      Motor: No abnormal muscle tone.   Psychiatric:         Speech: Speech normal.         Behavior: Behavior normal.               Diagnoses and health risks identified today and associated recommendations/orders:    1. Encounter for Medicare annual wellness exam  - Ambulatory Referral/Consult " to Enhanced Annual Wellness Visit (eAWV)   reports has flu and covid vaccines scheduled for next month  Discussed receiving Shingrix at pharmacy.    Discussed receiving rsv vaccine at pharmacy.     Was able to schedule mammogram, lung cancer screening ct, and dexa  Encouraged healthy diet and exercise as tolerated   Has urine leakage ever interrupted your daily activites or sleep? No  Do you think you could use some help to better manage urine leakage?No     2. Mild vascular dementia with mood disturbance  Aricept and Namenda  Continue current treatment plan as previously prescribed with your  neurologist.     3. Current mild episode of major depressive disorder, unspecified whether recurrent  PHQ 9-7  Denies SI  Reports stress and anxiety  Advised to follow up with PCP/counselor (message sent to staff for apt) for further evaluation and recommendations.   expressed understanding.      4. Essential hypertension  Stable. Continue current treatment plan as previously prescribed with your  pcp     5. Hyperlipidemia, unspecified hyperlipidemia type  Stable and controlled. Continue current treatment plan as previously prescribed with your PCP.      6. Tobacco use  Discussed the importance of smoking cessation and advised to quit smoking. Patient expressed understanding.   - Ambulatory referral/consult to Smoking Cessation Program; Future    7. Financial difficulties  [facility] financial resources information page given to patient.    - Ambulatory referral/consult to Outpatient Case Management    8. Food insecurity  - Ambulatory referral/consult to Outpatient Case Management    9. Numbness and tingling  BUE  Chronic  Advised to follow up with PCP for further evaluation and recommendations. Patient expressed understanding.      10. Encounter for screening mammogram for breast cancer  - Mammo Digital Screening Bilat w/ Tobi; Future      Provided Rosa Isela with a 5-10 year written screening schedule and personal  prevention plan. Recommendations were developed using the USPSTF age appropriate recommendations. Education, counseling, and referrals were provided as needed. After Visit Summary printed and given to patient which includes a list of additional screenings\tests needed.    Follow up in about 1 year (around 9/20/2025) for awv.    Samantha Beard NP      I offered to discuss advanced care planning, including how to pick a person who would make decisions for you if you were unable to make them for yourself, called a health care power of , and what kind of decisions you might make such as use of life sustaining treatments such as ventilators and tube feeding when faced with a life limiting illness recorded on a living will that they will need to know. (How you want to be cared for as you near the end of your natural life)     X  Patient has advanced directives written and agrees to provide copies to the institution.

## 2024-09-20 NOTE — PATIENT INSTRUCTIONS
Counseling and Referral of Other Preventative  (Italic type indicates deductible and co-insurance are waived)    Patient Name: Rosa Isela Pate  Today's Date: 9/20/2024    Health Maintenance       Date Due Completion Date    LDCT Lung Screen Never done ---    RSV Vaccine (Age 60+ and Pregnant patients) (1 - 1-dose 60+ series) Never done ---    Shingles Vaccine (2 of 2) 10/05/2021 8/10/2021    DEXA Scan 03/29/2024 3/29/2021    Mammogram 07/07/2024 7/7/2023    Influenza Vaccine (1) 09/01/2024 10/23/2023    COVID-19 Vaccine (8 - 2023-24 season) 09/01/2024 11/16/2023    Hemoglobin A1c (Prediabetes) 05/01/2025 5/1/2024    High Dose Statin 07/30/2025 7/30/2024    Colorectal Cancer Screening 06/26/2026 6/26/2023    Lipid Panel 05/01/2029 5/1/2024    TETANUS VACCINE 05/10/2031 5/10/2021        Orders Placed This Encounter   Procedures    Mammo Digital Screening Bilat w/ Tobi    Ambulatory referral/consult to Smoking Cessation Program    Ambulatory referral/consult to Outpatient Case Management     The following information is provided to all patients.  This information is to help you find resources for any of the problems found today that may be affecting your health:                  Living healthy guide: www.[facility].[place].gov      Understanding Diabetes: www.diabetes.org      Eating healthy: www.cdc.gov/healthyweight      CDC home safety checklist: www.cdc.gov/steadi/patient.html      Agency on Aging: www.goea.[place].[facility]      Alcoholics anonymous (AA): www.aa.org      Physical Activity: www.efren.nih.gov/dq1yxkf      Tobacco use: www.quitwithusla.org

## 2024-09-23 ENCOUNTER — TELEPHONE (OUTPATIENT)
Dept: PSYCHIATRY | Facility: CLINIC | Age: 74
End: 2024-09-23
Payer: MEDICARE

## 2024-09-23 DIAGNOSIS — I10 ESSENTIAL HYPERTENSION: ICD-10-CM

## 2024-09-23 DIAGNOSIS — R05.8 ACE-INHIBITOR COUGH: ICD-10-CM

## 2024-09-23 DIAGNOSIS — T46.4X5A ACE-INHIBITOR COUGH: ICD-10-CM

## 2024-09-23 NOTE — TELEPHONE ENCOUNTER
----- Message from Samantha Beard NP sent at 9/20/2024  8:51 AM CDT -----  Pt would like a call back to discuss scheduling apt.   Thanks,   Samantha

## 2024-09-24 RX ORDER — IRBESARTAN AND HYDROCHLOROTHIAZIDE 150; 12.5 MG/1; MG/1
1 TABLET, FILM COATED ORAL DAILY
Qty: 90 TABLET | Refills: 2 | Status: SHIPPED | OUTPATIENT
Start: 2024-09-24

## 2024-09-24 NOTE — TELEPHONE ENCOUNTER
Refill Decision Note   Rosa Isela Pate  is requesting a refill authorization.  Brief Assessment and Rationale for Refill:  Approve     Medication Therapy Plan:         Comments:     Note composed:11:51 AM 09/24/2024

## 2024-09-24 NOTE — TELEPHONE ENCOUNTER
No care due was identified.  [facility] Embedded Care Due Messages. Reference number: [id number].   9/23/2024 9:02:21 PM CDT

## 2024-09-25 ENCOUNTER — TELEPHONE (OUTPATIENT)
Dept: PSYCHIATRY | Facility: CLINIC | Age: 74
End: 2024-09-25
Payer: MEDICARE

## 2024-09-25 NOTE — TELEPHONE ENCOUNTER
----- Message from Andrew Marte sent at 9/25/2024  9:04 AM CDT -----  Contact: Rosa Isela  .Type:  Needs Virtual Appt     Who Called:  Rosa Isela     Would the patient rather a call back or a response via [facility]?  Call back   Best Call Back Number:  [phone]     Additional Information:  Pt is calling in regard to the appt scheduled today at 10am and would like to switch the appt to virtual instead of in person  due to having transportation issues       Thanks

## 2024-10-01 ENCOUNTER — PATIENT OUTREACH (OUTPATIENT)
Dept: ADMINISTRATIVE | Facility: OTHER | Age: 74
End: 2024-10-01
Payer: MEDICARE

## 2024-10-01 ENCOUNTER — PATIENT MESSAGE (OUTPATIENT)
Dept: ADMINISTRATIVE | Facility: OTHER | Age: 74
End: 2024-10-01
Payer: MEDICARE

## 2024-10-01 ENCOUNTER — PATIENT MESSAGE (OUTPATIENT)
Dept: NEUROLOGY | Facility: CLINIC | Age: 74
End: 2024-10-01
Payer: MEDICARE

## 2024-10-01 RX ORDER — LAMOTRIGINE 100 MG/1
100 TABLET ORAL 2 TIMES DAILY
Qty: 60 TABLET | Refills: 11 | Status: SHIPPED | OUTPATIENT
Start: 2024-10-01 | End: 2025-10-01

## 2024-10-01 NOTE — PATIENT INSTRUCTIONS
Utilities     [place] .gov  www.[facility].[place].gov>energy -assistance    LIHEAP   [phone]    [employer] www.Emcore.org    [facility] (For Entergy)  [phone]    [place] [employer]  [phone]    [facility] [employer]  [phone]    Dr. Jese Mujica [facility] ([facility])  [phone]    Water Bill Assistance     [facility] [phone]    Home Repair Resources     [employer] [place]  [phone]    [employer] [place]  [phone]     [employer] [place] (those affected by 7412-7786 disasters)  [phone]    [employer]  [phone]    Rental Assistance    [place] Office of  [phone]    [facility] www.Emcore.[facility]    [facility]  [phone]    [employer] [place]  [phone]    Food Roth    [facility] [employer]out [facility]  [phone]    [facility] Food Pantry  [phone]    [employer] [place] [employer]  [phone]    [facility]  [phone]    [facility]  [phone]    [facility]  [phone]    [facility]   [phone]    Commodities  [phone] option 1    Transportation     [employer] ([facility])  Paratransit service  [phone] $1.75 one way  Form needs to be completed by patient and MD  [phone] Schedule ride    [place] [facility] [employer] [facility]'s [employer] provides ADA/paratransit services for those with a disability that prevents them from using regular [employer] buses.    We provide:  - Transportation  For eligible riders, the [employer] fare is $1.75 each way. Multi-ride tickets are available. A copy of the policies and procedures, can be found on our website in both English, Croatian. For those with visual impairments, please call the [employer] ADA Manager at [phone] for a reader-friendly word document.    Advance certification is required to obtain  [employer] service. The certification process generally takes about three weeks and has the following key requirements:    - Completed and mailed application to [employer] Paratransit Coordinator  - After form is mailed, the applicant must be scheduled for an interview at the [employer] administrative offices.  - Trips will be provided under the provisions of the Americans with Disabilities Act (ADA) only if both origin and destination are within 3/4 mile of the [employer] fixed-route system.    Call [employer] at [phone] for details about additional restrictions.

## 2024-10-01 NOTE — PROGRESS NOTES
CHW - Initial Contact    This LPN updated the Social Determinant of Health questionnaire answers that have changed with patient via telephone today.    Pt identified barriers of most importance are: Transportation, food, utilities   Referrals to community agencies completed with patient/caregiver consent outside of [facility] include:  No  Referrals were put through [facility] - no:   Support and Services: updated the Social Determinant of Health questionnaire answers that have changed , left message with Migdalia Grady's office to reschedule missed appointment, Sent In Basket message with transportation, food and utility resources. Mailed resources  Other information discussed the patient needs / wants help with: None   Follow up required: yes  Follow-up Outreach - Due: 10/8/2024       LPN spoke to patient/caregiver as per OPCM referral for: Financial difficulties, food insecurity    Does the patient consent to completing the assessment/enrollment: Yes  Does the patient consent for LPN to speak to a caregiver? No    Health Insurance Coverage:     Does the patient have adequate health insurance coverage? Yes  Education provided: No    PCP Follow-Up Appointments:    Does the patient have a primary care provider? yes - Dr. Sherrell Drummond  Date of last PCP appointment? 5/1/24  Next PCP appointment:  11/4/24  Was patient provided with education surrounding PCP services/creating a medical home? yes - Educated on the benefits of having a PCP.   Educated on the use of urgent care clinic for non emergent issues when PCP unavailable.  Patient verbalized understanding.      Specialist Appointments:     Does the patient have a pending specialist referral? no  Does the patient have an upcoming specialist appointment? yes - Neurology 1/30/2025  Is the patient pregnant? No  Does the patient have a mental health provider? No Patient missed psychiatry appointment 9/25/24.  Left message with office to contact the patient to  reschedule.       Home Medications:     Reviewed medication list with patient? Yes  Is the patient able to afford their medications? No  Patient states she is having difficulty paying for some of her medications.  She states her , Jas, is working with Charis Hawley with [facility] Patient Assistance Team to get assistance.    Care Gaps:     Does the patient have any care gaps that are due? Yes    Care Gaps     Overdue          Never done LDCT Lung Screen (Yearly)   Scheduled for: Oct 9, 2024     Never done RSV Vaccine (Age 60+ and Pregnant patients) (1 - Risk 60-74 years 1-dose series)     OCT 5  2021 Shingles Vaccine (2 of 2)  Last completed: Aug 10, 2021     MAR 29  2024 DEXA Scan (Every 3 Years)   Scheduled for: Oct 9, 2024     JUL 7  2024 Mammogram (Yearly)   Scheduled for: Oct 9, 2024     SEP 1  2024 Influenza Vaccine (1)  Last completed: Oct 23, 2023     SEP 1  2024 COVID-19 Vaccine (8 - 2024-25 season)  Last completed: Nov 16, 2023         Recent lab results:  Blood Sugar: N/A   Provided education: No  Blood Pressure:   Provided education: No        Social Determinants of Health (SDOH)    Patient's identified areas of need:    Transportation, food, utilities   Education/Resources provided:    Yes      Home Health/DME:    Current Home Health: No  Patient has all healthcare equipment/supplies indicated: no  Current DME: none    Additional Documentation:   Identity verified.  Patient states she has trouble with her memory since her stroke.  She states she is going to [facility] on Tuesdays and Thursdays.  Patient states she has issues with transportation because the air conditioner in her car is not working.  She states she does not have the money to have the car repaired and sometimes she does not have money for gas.  Offered to provide transportation resources, patient accepted.  She requests the information be sent via patient portal and mail.  She states she also has food insecurity.  She  states they do not qualify for SNAP and she is getting Meals on Wheels for herself, but not her .  Offered to provide food bank resources, patient accepted. Patient states she has never had the electricity turned off because her sister helps her pay the bill.  Offered to provide utility resources, patient accepted.  Patient missed psychiatry appointment 9/25/24.  Offered to reschedule appointment, patient accepted.  Patient verbalized understanding to all information and instructions.      Follow up:   Patient agrees to scheduled follow up call. Yes

## 2024-10-01 NOTE — TELEPHONE ENCOUNTER
Spoke with pt and advised her that I spoke with the pharmacy and they advised that her insurance will cover the 100 day supply. Patient did verbalize understanding and advised that was okay.

## 2024-10-09 ENCOUNTER — HOSPITAL ENCOUNTER (OUTPATIENT)
Dept: RADIOLOGY | Facility: HOSPITAL | Age: 74
Discharge: HOME | End: 2024-10-09
Attending: FAMILY MEDICINE
Payer: MEDICARE

## 2024-10-09 ENCOUNTER — PATIENT MESSAGE (OUTPATIENT)
Dept: NEUROLOGY | Facility: CLINIC | Age: 74
End: 2024-10-09
Payer: MEDICARE

## 2024-10-09 VITALS — BODY MASS INDEX: 28.39 KG/M2 | WEIGHT: 160.25 LBS | HEIGHT: 63 IN

## 2024-10-09 DIAGNOSIS — F17.210 CIGARETTE NICOTINE DEPENDENCE WITHOUT COMPLICATION: ICD-10-CM

## 2024-10-09 DIAGNOSIS — Z12.31 SCREENING MAMMOGRAM, ENCOUNTER FOR: ICD-10-CM

## 2024-10-09 PROCEDURE — 77067 SCR MAMMO BI INCL CAD: CPT | Mod: TC

## 2024-10-09 PROCEDURE — 77063 BREAST TOMOSYNTHESIS BI: CPT | Mod: 26,,, | Performed by: RADIOLOGY

## 2024-10-09 PROCEDURE — 77067 SCR MAMMO BI INCL CAD: CPT | Mod: 26,,, | Performed by: RADIOLOGY

## 2024-10-09 PROCEDURE — 71271 CT THORAX LUNG CANCER SCR C-: CPT | Mod: TC

## 2024-10-09 PROCEDURE — 71271 CT THORAX LUNG CANCER SCR C-: CPT | Mod: 26,,, | Performed by: RADIOLOGY

## 2024-10-14 ENCOUNTER — PATIENT OUTREACH (OUTPATIENT)
Dept: ADMINISTRATIVE | Facility: OTHER | Age: 74
End: 2024-10-14
Payer: MEDICARE

## 2024-10-16 ENCOUNTER — PATIENT MESSAGE (OUTPATIENT)
Dept: INTERNAL MEDICINE | Facility: CLINIC | Age: 74
End: 2024-10-16
Payer: MEDICARE

## 2024-11-06 ENCOUNTER — PATIENT MESSAGE (OUTPATIENT)
Dept: INTERNAL MEDICINE | Facility: CLINIC | Age: 74
End: 2024-11-06
Payer: MEDICARE

## 2024-11-14 ENCOUNTER — PATIENT MESSAGE (OUTPATIENT)
Dept: INTERNAL MEDICINE | Facility: CLINIC | Age: 74
End: 2024-11-14

## 2024-11-14 ENCOUNTER — OFFICE VISIT (OUTPATIENT)
Dept: INTERNAL MEDICINE | Facility: CLINIC | Age: 74
End: 2024-11-14
Payer: MEDICARE

## 2024-11-14 ENCOUNTER — LAB VISIT (OUTPATIENT)
Dept: LAB | Facility: HOSPITAL | Age: 74
End: 2024-11-14
Attending: FAMILY MEDICINE
Payer: MEDICARE

## 2024-11-14 ENCOUNTER — TELEPHONE (OUTPATIENT)
Dept: INTERNAL MEDICINE | Facility: CLINIC | Age: 74
End: 2024-11-14

## 2024-11-14 VITALS
SYSTOLIC BLOOD PRESSURE: 146 MMHG | RESPIRATION RATE: 16 BRPM | BODY MASS INDEX: 28.12 KG/M2 | TEMPERATURE: 97 F | DIASTOLIC BLOOD PRESSURE: 72 MMHG | HEART RATE: 69 BPM | OXYGEN SATURATION: 97 % | WEIGHT: 158.75 LBS

## 2024-11-14 DIAGNOSIS — F01.A3 MILD VASCULAR DEMENTIA WITH MOOD DISTURBANCE: ICD-10-CM

## 2024-11-14 DIAGNOSIS — F32.2 MAJOR DEPRESSIVE DISORDER, SINGLE EPISODE, SEVERE WITHOUT PSYCHOTIC FEATURES: ICD-10-CM

## 2024-11-14 DIAGNOSIS — E78.5 HYPERLIPIDEMIA, UNSPECIFIED HYPERLIPIDEMIA TYPE: ICD-10-CM

## 2024-11-14 DIAGNOSIS — R41.3 MEMORY LOSS: ICD-10-CM

## 2024-11-14 DIAGNOSIS — I10 ESSENTIAL HYPERTENSION: ICD-10-CM

## 2024-11-14 DIAGNOSIS — R41.3 MEMORY LOSS: Primary | ICD-10-CM

## 2024-11-14 PROCEDURE — 1159F MED LIST DOCD IN RCRD: CPT | Mod: CPTII,S$GLB,, | Performed by: FAMILY MEDICINE

## 2024-11-14 PROCEDURE — 80175 DRUG SCREEN QUAN LAMOTRIGINE: CPT | Performed by: FAMILY MEDICINE

## 2024-11-14 PROCEDURE — 3288F FALL RISK ASSESSMENT DOCD: CPT | Mod: CPTII,S$GLB,, | Performed by: FAMILY MEDICINE

## 2024-11-14 PROCEDURE — 3008F BODY MASS INDEX DOCD: CPT | Mod: CPTII,S$GLB,, | Performed by: FAMILY MEDICINE

## 2024-11-14 PROCEDURE — 1101F PT FALLS ASSESS-DOCD LE1/YR: CPT | Mod: CPTII,S$GLB,, | Performed by: FAMILY MEDICINE

## 2024-11-14 PROCEDURE — 99999 PR PBB SHADOW E&M-EST. PATIENT-LVL IV: CPT | Mod: PBBFAC,,, | Performed by: FAMILY MEDICINE

## 2024-11-14 PROCEDURE — 1126F AMNT PAIN NOTED NONE PRSNT: CPT | Mod: CPTII,S$GLB,, | Performed by: FAMILY MEDICINE

## 2024-11-14 PROCEDURE — 3078F DIAST BP <80 MM HG: CPT | Mod: CPTII,S$GLB,, | Performed by: FAMILY MEDICINE

## 2024-11-14 PROCEDURE — 3044F HG A1C LEVEL LT 7.0%: CPT | Mod: CPTII,S$GLB,, | Performed by: FAMILY MEDICINE

## 2024-11-14 PROCEDURE — 3077F SYST BP >= 140 MM HG: CPT | Mod: CPTII,S$GLB,, | Performed by: FAMILY MEDICINE

## 2024-11-14 PROCEDURE — 99214 OFFICE O/P EST MOD 30 MIN: CPT | Mod: S$GLB,,, | Performed by: FAMILY MEDICINE

## 2024-11-14 PROCEDURE — 36415 COLL VENOUS BLD VENIPUNCTURE: CPT | Performed by: FAMILY MEDICINE

## 2024-11-14 PROCEDURE — G2211 COMPLEX E/M VISIT ADD ON: HCPCS | Mod: S$GLB,,, | Performed by: FAMILY MEDICINE

## 2024-11-14 RX ORDER — AMLODIPINE BESYLATE 10 MG/1
10 TABLET ORAL DAILY
Qty: 90 TABLET | Refills: 1 | Status: SHIPPED | OUTPATIENT
Start: 2024-11-14

## 2024-11-14 NOTE — TELEPHONE ENCOUNTER
Lacey Thorpe, Sherrell Garcia MD; Cali Piedra MD  Aricept can cause GI upset, and Namenda is known to cause dizziness. I would stop the Aricept first to see if the symptoms subside      This is what recommended by neurology.  Please try this at night to see if the symptoms stop.    Call her  and let him know.

## 2024-11-14 NOTE — PROGRESS NOTES
Rosa Isela Pate  11/14/2024  [id number]    Sherrell Drummond MD  Patient Care Team:  Sherrell Drummond MD as PCP - General (Family Medicine)  Anahi Diana LPN as Care Coordinator  Marc Mackay PharmSHANT as Hypertension Digital Medicine Clinician  Marc Mackay PharmD as Hyperlipidemia Digital Medicine Clinician (Pharmacist)  Qian Qureshi MD as Hypertension Digital Medicine Responsible Provider (Internal Medicine)  Qian Qureshi MD as Hyperlipidemia Digital Medicine Responsible Provider (Internal Medicine)  Migdalia Grady LCSW as  (Psychiatry)  Lacey Thorpe NP as Nurse Practitioner (Neurology)  Christopher Manrique OD as Consulting Physician (Optometry)  Evelyn Thompson LPN as Licensed Practical Nurse  Medicare, Digital Medicine as Hypertension Digital Medicine Contract          Visit Type:a scheduled routine follow-up visit    Chief Complaint:  Chief Complaint   Patient presents with    Follow-up       History of Present Illness:    History of Present Illness    CHIEF COMPLAINT:  Ms. Pate presents for follow-up to address ongoing health issues, including progressive short-term memory loss, medication compliance difficulties, and blood pressure management.    HPI:  Ms. Pate, a 74-year-old female, has progressive short-term memory loss. She previously attended a day program at [place] but discontinued participation. Her , the primary caretaker, reports difficulty ensuring medication compliance.  She is staying up later watching TV, sleeping in.     Ms. Pate has agitation without aggression on occasion. Pleasant today in office. . She reports dizziness and nausea in the evenings when taking medications, a long-standing issue that has increased in the last 3 months. These symptoms persist after eating before or with medication.  not sure which evening medication is causing this.     Blood pressure has been fluctuating. Ms. Pate typically takes blood pressure medication  around 10:00 AM, recording it for the digital medicine program about 1.5 hours later. Mean blood pressure readings have been higher than usual.    Ms. Pate reports shoulder pain upon waking and arm numbness in the morning until becoming active. This shoulder pain and numbness have been ongoing. Ms. Pate attributes this to arthritis and aging, stating it does not cause significant discomfort. She is moving her arm with full ROM in visit and does not complain of pain.     Regarding sleep patterns, patient has been staying up very late watching TV and using the telephone. Her  notes that a previously prescribed sleep medication (trazodone) initially worked well, helping her go to bed early, but is no longer effective in maintaining a good sleep schedule.    Ms. Pate denies aggression, numbness or tingling in her arms during daytime activities, and any significant discomfort from her shoulder pain.    MEDICATIONS:  Ms. Pate is on Namenda 10 mg twice daily and Aricept 10 mg at night. She is taking Lamictal 50 mg twice daily for mood stabilization and BuSpar 10 mg.     For blood pressure control, she is on Avalide and Amlodipine 5 mg. She takes Aspirin daily and Atorvastatin 80 mg daily for cholesterol.     Trazodone is used for sleep. Some medicines cause dizziness and nausea in the evening, particularly GI discomfort.    MEDICAL HISTORY:  Ms. Pate has a history of hypertension, hyperlipidemia, and vasovagal syncope. She has experienced a lacunar infarction which resulted in right-sided weakness and deficit in activities of daily living. 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 Ms. Pate has received a high-dose influenza vaccine.    TEST RESULTS:  In May, a full panel was conducted showing LDL in goal range with no signs of diabetes. Her Hemoglobin A1c was 5.5. A complete metabolic panel revealed normal electrolytes, EGFR 59, and normal creatinine. Her urine  protein-to-creatinine ratio has been historically normal.    IMAGING:  Ms. Pate had bilateral shoulder X-rays 1.5 years ago, which showed that her shoulders were not significantly abnormal.        Answers submitted by the patient for this visit:  Review of Systems Questionnaire (Submitted on 11/12/2024)  activity change: No  unexpected weight change: No  rhinorrhea: No  trouble swallowing: No  visual disturbance: No  chest tightness: No  polyuria: No  difficulty urinating: No  menstrual problem: No  joint swelling: No  arthralgias: No  confusion: No  dysphoric mood: Yes      The following were reviewed at this visit: active problem list, medication list, allergies, family history, social history, and health maintenance.  History:  Past Medical History:   Diagnosis Date    Chronic kidney disease, stage 3a 04/20/2023    Depression     Hyperlipidemia     Hypertension     Obesity     Stroke     Tobacco dependence      Past Surgical History:   Procedure Laterality Date    CHOLECYSTECTOMY      removal    COLONOSCOPY N/A 11/1/2017    Procedure: COLONOSCOPY;  Surgeon: Francisco Javier Bai MD;  Location: [facility] ENDO;  Service: Endoscopy;  Laterality: N/A;    COLONOSCOPY N/A 6/26/2023    Procedure: COLONOSCOPY;  Surgeon: Vicki Krishna MD;  Location: [facility] ENDO;  Service: Endoscopy;  Laterality: N/A;    HYSTERECTOMY      MYOMECTOMY      removal    OOPHORECTOMY      TUBAL LIGATION       Patient Active Problem List   Diagnosis    Essential hypertension    Hyperlipidemia    Vasovagal syncope    Tobacco use disorder    Class 1 obesity due to excess calories in adult    Right sided weakness    Deficit in activities of daily living (ADL)    Cognitive communication disorder    Decreased activity tolerance    Decreased strength of lower extremity    Lacunar infarction    Major depressive disorder with single episode, in full remission    Chronic kidney disease, stage 3a    Mild vascular dementia with mood disturbance        Medications:  Current Outpatient Medications on File Prior to Visit   Medication Sig Dispense Refill    aspirin 81 MG Chew Take 1 tablet (81 mg total) by mouth once daily. 90 tablet 3    atorvastatin (LIPITOR) 80 MG tablet Take 1 tablet (80 mg total) by mouth nightly. 90 tablet 3    busPIRone (BUSPAR) 5 MG Tab Take 2 tablets (10 mg total) by mouth 2 (two) times daily. 360 tablet 3    donepeziL (ARICEPT) 10 MG tablet Take 1 tablet (10 mg total) by mouth every evening. Take one half tablet for one week then as prescribed. 90 tablet 3    irbesartan-hydrochlorothiazide (AVALIDE) 150-12.5 mg per tablet TAKE 1 TABLET BY MOUTH ONCE  DAILY 90 tablet 2    lamoTRIgine (LAMICTAL) 100 MG tablet Take 1 tablet (100 mg total) by mouth 2 (two) times daily. 60 tablet 11    memantine (NAMENDA) 10 MG Tab Take 1 tablet (10 mg total) by mouth 2 (two) times daily. 180 tablet 3    multivitamin with minerals tablet       traZODone (DESYREL) 50 MG tablet Take 1 tablet (50 mg total) by mouth every evening. for 14 days 14 tablet 0    [DISCONTINUED] amLODIPine (NORVASC) 5 MG tablet TAKE 1 TABLET BY MOUTH ONCE  DAILY 90 tablet 2     No current facility-administered medications on file prior to visit.       Medications have been reviewed and reconciled with patient at this visit.    Exam:  Wt Readings from Last 3 Encounters:   11/14/24 72 kg (158 lb 11.7 oz)   10/09/24 72.7 kg (160 lb 4.4 oz)   09/20/24 72.7 kg (160 lb 4.4 oz)     Temp Readings from Last 3 Encounters:   11/14/24 96.5 °F (35.8 °C) (Tympanic)   05/01/24 96.8 °F (36 °C)   03/18/24 97.2 °F (36.2 °C) (Tympanic)     BP Readings from Last 3 Encounters:   11/14/24 (!) 146/72   09/20/24 120/66   05/01/24 110/60     Pulse Readings from Last 3 Encounters:   11/14/24 69   09/20/24 62   05/01/24 80     Body mass index is 28.12 kg/m².      Review of Systems   HENT:  Positive for hearing loss.    Eyes:  Negative for discharge.   Respiratory:  Negative for wheezing.    Cardiovascular:   Negative for chest pain and palpitations.   Gastrointestinal:  Positive for diarrhea and nausea. Negative for blood in stool, constipation and vomiting.   Genitourinary:  Negative for dysuria and hematuria.   Musculoskeletal:  Positive for joint pain. Negative for neck pain.   Neurological:  Positive for dizziness. Negative for weakness and headaches.   Endo/Heme/Allergies:  Negative for polydipsia.     Physical Exam  Nursing note reviewed.   Cardiovascular:      Rate and Rhythm: Normal rate and regular rhythm.   Pulmonary:      Effort: Pulmonary effort is normal. No respiratory distress.   Neurological:      Mental Status: She is alert and oriented to person, place, and time.   Psychiatric:         Mood and Affect: Mood normal.         Behavior: Behavior normal.         Cognition and Memory: Memory is impaired. She exhibits impaired recent memory and impaired remote memory.       Physical Exam             Laboratory Reviewed ({Yes)  Lab Results   Component Value Date    WBC 5.29 05/01/2024    HGB 12.7 05/01/2024    HCT 40.1 05/01/2024     05/01/2024    CHOL 111 (L) 05/01/2024    TRIG 116 05/01/2024    HDL 50 05/01/2024    ALT 30 05/01/2024    AST 33 05/01/2024     05/01/2024    K 3.6 05/01/2024     05/01/2024    CREATININE 1.0 05/01/2024    BUN 17 05/01/2024    CO2 27 05/01/2024    TSH 1.511 10/26/2023    INR 1.0 10/27/2022    HGBA1C 5.5 05/01/2024       Rosa Isela was seen today for follow-up.    Diagnoses and all orders for this visit:    Memory loss  -     LAMOTRIGINE LEVEL; Future    Major depressive disorder, single episode, severe without psychotic features  -     amLODIPine (NORVASC) 10 MG tablet; Take 1 tablet (10 mg total) by mouth once daily.    Hyperlipidemia, unspecified hyperlipidemia type    Essential hypertension    Mild vascular dementia with mood disturbance          Assessment & Plan    I10 Essential (primary) hypertension    E78.5 Hyperlipidemia, unspecified    I63.30 Cerebral  infarction due to thrombosis of unspecified cerebral artery    Z73.6 Limitation of activities due to disability    F01.50 Vascular dementia, unspecified severity, without behavioral disturbance, psychotic disturbance, mood disturbance, and anxiety    R42 Dizziness and giddiness      G47.00 Insomnia, unspecified    Assessed blood pressure control; considering increasing amlodipine dose due to elevated mean BP to 10 mg. Use digital for survillence.     Suspect Namenda as possible cause of reported dizziness and nausea; considering dose reduction- I will send message to primary neurology team regarding evening medicaiton, I will see if they recommend holding dose of medication.  Patient instructed not to change until we discuss with Neuro team.    Evaluated shoulder pain; likely arthritis-related, exacerbated by sleep position- no pain in clinic today    Reviewed recent lab results from May; cholesterol, kidney function, and diabetes screening all within acceptable ranges    Discussion with medication regimen with Dr. Kala Thorpe for potential adjustments to address side effects    HYPERTENSION:  Increased amlodipine from 5mg to 10mg daily.  Continued Avalide at current dose.  Continued aspirin daily.    HYPERLIPIDEMIA:  Continued atorvastatin 80mg daily.    VASCULAR DEMENTIA:  Continued Namenda 10mg twice daily (pending discussion with Dr. Kala Thorpe about potential dose reduction).  Continued Aricept 10mg at night.    SHOULDER PAIN AND OSTEOARTHRITIS:  Explained that shoulder pain and numbness upon waking likely due to arthritis in neck, exacerbated by sleep position.  Discussed that moving and activity typically improve arthritis-related stiffness and pain.  May take Tylenol at bedtime for shoulder pain if bothering patient.    INSOMNIA AND SLEEP HYGIENE:  Ms. Pate to improve sleep hygiene; avoid late-night TV watching and phone use.    MOOD AND ANXIETY:  Continued Lamictal 50mg twice daily.  Continued  BuSpar 10mg.  Lamictal level ordered.    GENERAL FOLLOW-UP:  Keep provider updated on medication effects and symptoms via patient portal.            Care Plan/Goals: Reviewed     Follow up: No follow-ups on file.    After visit summary was printed and given to patient upon discharge today.  Patient goals and care plan are included in After Visit Summary.

## 2024-11-14 NOTE — Clinical Note
said over 3 months, evening meds giving her some stomach upset and dizziness at times.  BP meds are taken in am, and digital readings reviewed. Doesn't seem to be due to those medications.  Could it be the Namenda or Aricept? What recommendations can you make for those medications and the complaints she has with her evening meds?

## 2024-11-18 LAB — LAMOTRIGINE SERPL-MCNC: 2.4 UG/ML (ref 2–15)

## 2024-11-20 ENCOUNTER — PATIENT OUTREACH (OUTPATIENT)
Dept: ADMINISTRATIVE | Facility: OTHER | Age: 74
End: 2024-11-20
Payer: MEDICARE

## 2024-11-20 NOTE — PROGRESS NOTES
CHW - Follow Up    This LPN completed a follow up visit with patient via telephone today.  Pt/Caregiver reported: Identity verified.  Informed patient that Migdalia Grady's office will call her with an appointment .  Patient verbalized understanding.  LPN provided: see above  Follow up required: Yes  Follow-up Outreach - Due: 12/11/2024

## 2024-11-20 NOTE — PROGRESS NOTES
CHW - Follow Up    This LPN completed a follow up visit with patient via telephone today.  Pt/Caregiver reported: Identity verified.  Patient states the dizziness and nausea have improved.  BP did not check.  Patient states she is depressed.  Patient missed appointment with psychiatry on 10/31/24.  Offered to reschedule appointment, patient accepted.  Reminded patient is she needs assistance with paying her utility bill to call the resources provided.  Patient verbalized understanding to all instructions.  LPN provided: Called to reschedule cancelled appointment with Migdalia Grady LCSW, message sent to office to contact patient with an appointment.  Follow up required: Yes  Follow-up Outreach - Due: 11/26/2024

## 2024-11-21 ENCOUNTER — TELEPHONE (OUTPATIENT)
Dept: PSYCHIATRY | Facility: CLINIC | Age: 74
End: 2024-11-21
Payer: MEDICARE

## 2024-11-21 NOTE — TELEPHONE ENCOUNTER
----- Message from Jade sent at 11/20/2024 10:46 AM CST -----  Contact: Evelyn ([facility])  Mrs. Rivas was calling in regards to rescheduling an appointment for Mrs. Natarajan on 10/31. She stated that she would prefer a early morning appointment if possible. A good call back number is [phone] or [phone].    Thanks  MW

## 2024-11-23 ENCOUNTER — PATIENT MESSAGE (OUTPATIENT)
Dept: INTERNAL MEDICINE | Facility: CLINIC | Age: 74
End: 2024-11-23
Payer: MEDICARE

## 2024-11-29 DIAGNOSIS — J20.9 ACUTE BRONCHITIS, UNSPECIFIED ORGANISM: Primary | ICD-10-CM

## 2024-11-29 DIAGNOSIS — J20.9 ACUTE BRONCHITIS, UNSPECIFIED ORGANISM: ICD-10-CM

## 2024-11-29 RX ORDER — ALBUTEROL SULFATE 90 UG/1
2 INHALANT RESPIRATORY (INHALATION) EVERY 6 HOURS PRN
Qty: 18 G | Refills: 0 | Status: SHIPPED | OUTPATIENT
Start: 2024-11-29

## 2024-11-29 RX ORDER — PROMETHAZINE HYDROCHLORIDE AND DEXTROMETHORPHAN HYDROBROMIDE 6.25; 15 MG/5ML; MG/5ML
5 SYRUP ORAL EVERY 6 HOURS PRN
Qty: 180 ML | Refills: 0 | Status: SHIPPED | OUTPATIENT
Start: 2024-11-29 | End: 2024-12-09

## 2024-11-29 RX ORDER — ALBUTEROL SULFATE 90 UG/1
2 INHALANT RESPIRATORY (INHALATION) EVERY 6 HOURS PRN
Qty: 18 G | Refills: 0 | Status: SHIPPED | OUTPATIENT
Start: 2024-11-29 | End: 2024-11-29

## 2024-11-29 RX ORDER — PROMETHAZINE HYDROCHLORIDE AND DEXTROMETHORPHAN HYDROBROMIDE 6.25; 15 MG/5ML; MG/5ML
5 SYRUP ORAL EVERY 6 HOURS PRN
Qty: 180 ML | Refills: 0 | Status: SHIPPED | OUTPATIENT
Start: 2024-11-29 | End: 2024-11-29

## 2024-12-02 ENCOUNTER — OFFICE VISIT (OUTPATIENT)
Dept: INTERNAL MEDICINE | Facility: CLINIC | Age: 74
End: 2024-12-02
Payer: MEDICARE

## 2024-12-02 VITALS
WEIGHT: 160.94 LBS | DIASTOLIC BLOOD PRESSURE: 78 MMHG | OXYGEN SATURATION: 96 % | BODY MASS INDEX: 28.51 KG/M2 | TEMPERATURE: 96 F | RESPIRATION RATE: 16 BRPM | SYSTOLIC BLOOD PRESSURE: 134 MMHG | HEART RATE: 69 BPM

## 2024-12-02 DIAGNOSIS — I10 ESSENTIAL HYPERTENSION: ICD-10-CM

## 2024-12-02 DIAGNOSIS — R09.81 SINUS CONGESTION: ICD-10-CM

## 2024-12-02 DIAGNOSIS — R05.9 COUGH, UNSPECIFIED TYPE: ICD-10-CM

## 2024-12-02 DIAGNOSIS — Z72.0 TOBACCO USE: ICD-10-CM

## 2024-12-02 DIAGNOSIS — R09.81 SINUS CONGESTION: Primary | ICD-10-CM

## 2024-12-02 DIAGNOSIS — G31.84 MCI (MILD COGNITIVE IMPAIRMENT): ICD-10-CM

## 2024-12-02 PROCEDURE — 1101F PT FALLS ASSESS-DOCD LE1/YR: CPT | Mod: CPTII,S$GLB,,

## 2024-12-02 PROCEDURE — 3044F HG A1C LEVEL LT 7.0%: CPT | Mod: CPTII,S$GLB,,

## 2024-12-02 PROCEDURE — 99999 PR PBB SHADOW E&M-EST. PATIENT-LVL IV: CPT | Mod: PBBFAC,,,

## 2024-12-02 PROCEDURE — 3288F FALL RISK ASSESSMENT DOCD: CPT | Mod: CPTII,S$GLB,,

## 2024-12-02 PROCEDURE — 99214 OFFICE O/P EST MOD 30 MIN: CPT | Mod: S$GLB,,,

## 2024-12-02 PROCEDURE — 3075F SYST BP GE 130 - 139MM HG: CPT | Mod: CPTII,S$GLB,,

## 2024-12-02 PROCEDURE — G2211 COMPLEX E/M VISIT ADD ON: HCPCS | Mod: S$GLB,,,

## 2024-12-02 PROCEDURE — 1159F MED LIST DOCD IN RCRD: CPT | Mod: CPTII,S$GLB,,

## 2024-12-02 PROCEDURE — 1160F RVW MEDS BY RX/DR IN RCRD: CPT | Mod: CPTII,S$GLB,,

## 2024-12-02 PROCEDURE — 3008F BODY MASS INDEX DOCD: CPT | Mod: CPTII,S$GLB,,

## 2024-12-02 PROCEDURE — 1126F AMNT PAIN NOTED NONE PRSNT: CPT | Mod: CPTII,S$GLB,,

## 2024-12-02 PROCEDURE — 3078F DIAST BP <80 MM HG: CPT | Mod: CPTII,S$GLB,,

## 2024-12-02 RX ORDER — METHYLPREDNISOLONE 4 MG/1
TABLET ORAL
Qty: 21 EACH | Refills: 0 | Status: SHIPPED | OUTPATIENT
Start: 2024-12-02 | End: 2024-12-23

## 2024-12-02 RX ORDER — MONTELUKAST SODIUM 10 MG/1
10 TABLET ORAL NIGHTLY
Qty: 30 TABLET | Refills: 0 | Status: SHIPPED | OUTPATIENT
Start: 2024-12-02 | End: 2025-01-01

## 2024-12-02 NOTE — PROGRESS NOTES
Rosa Isela Pate  12/02/2024  [id number]    Sherrell Drummond MD  Patient Care Team:  Sherrell Drummond MD as PCP - General (Family Medicine)  Anahi Diana LPN as Care Coordinator  Marc Mackay PharmD as Hypertension Digital Medicine Clinician  Marc Mackay PharmD as Hyperlipidemia Digital Medicine Clinician (Pharmacist)  Qian Qureshi MD as Hypertension Digital Medicine Responsible Provider (Internal Medicine)  Qian Qureshi MD as Hyperlipidemia Digital Medicine Responsible Provider (Internal Medicine)  Migdalia Grady LCSW as  (Psychiatry)  Lacey Thorpe NP as Nurse Practitioner (Neurology)  Christopher Manrique OD as Consulting Physician (Optometry)  Evelyn Thompson LPN as Licensed Practical Nurse  Medicare, Digital Medicine as Hypertension Digital Medicine Contract          Visit Type:an urgent visit for a new problem    Chief Complaint:  Chief Complaint   Patient presents with    Cough          History of Present Illness:    History of Present Illness    CHIEF COMPLAINT:  Ms. Pate presents today with a persistent cough.    RESPIRATORY SYMPTOMS:  She reports that the cough started last Wednesday. She experiences increased coughing at night since starting an unspecified medication. The cough syrup has reduced daytime coughing, and the Albuterol inhaler has been helpful. She also describes sinus and allergy symptoms, including postnasal drip and the need to clear her throat or nose, particularly in the morning.       ROS:  General: -fever, -chills, -fatigue, -weight gain, -weight loss  Eyes: -vision changes, -redness, -discharge  ENT: -ear pain, -nasal congestion, -sore throat  Cardiovascular: -chest pain, -palpitations, -lower extremity edema  Respiratory: +cough, -shortness of breath  Gastrointestinal: -abdominal pain, -nausea, -vomiting, -diarrhea, -constipation, -blood in stool  Genitourinary: -dysuria, -hematuria, -frequency  Musculoskeletal: -joint pain, -muscle pain  Skin:  -rash, -lesion  Neurological: -headache, -dizziness, -numbness, -tingling  Psychiatric: -anxiety, -depression, -sleep difficulty            History:  Past Medical History:   Diagnosis Date    Chronic kidney disease, stage 3a 04/20/2023    Depression     Hyperlipidemia     Hypertension     Obesity     Stroke     Tobacco dependence      Past Surgical History:   Procedure Laterality Date    CHOLECYSTECTOMY      removal    COLONOSCOPY N/A 11/1/2017    Procedure: COLONOSCOPY;  Surgeon: Francisco Javier Bai MD;  Location: [facility] ENDO;  Service: Endoscopy;  Laterality: N/A;    COLONOSCOPY N/A 6/26/2023    Procedure: COLONOSCOPY;  Surgeon: Vicki Krishna MD;  Location: [facility] ENDO;  Service: Endoscopy;  Laterality: N/A;    HYSTERECTOMY      MYOMECTOMY      removal    OOPHORECTOMY      TUBAL LIGATION       Family History   Problem Relation Name Age of Onset    Stroke Mother      Hypertension Mother      Hypertension Father      Heart disease Father      Cancer Sister          unknown    Alcohol abuse Brother      Diabetes Neg Hx       Social History     Socioeconomic History    Marital status:    Tobacco Use    Smoking status: Every Day     Current packs/day: 1.00     Average packs/day: 1 pack/day for 51.0 years (51.0 ttl pk-yrs)     Types: Cigarettes    Smokeless tobacco: Never   Substance and Sexual Activity    Alcohol use: Not Currently     Comment: occ    Drug use: No    Sexual activity: Not Currently     Partners: Male     Social Drivers of Health     Financial Resource Strain: High Risk (9/20/2024)    Overall Financial Resource Strain (CARDIA)     Difficulty of Paying Living Expenses: Very hard   Food Insecurity: Food Insecurity Present (9/20/2024)    Hunger Vital Sign     Worried About Running Out of Food in the Last Year: Sometimes true     Ran Out of Food in the Last Year: Never true   Transportation Needs: Unmet Transportation Needs (10/1/2024)    TRANSPORTATION NEEDS     Transportation : Yes, it has kept me  from non-medical meetings, appointments, work or from getting things that I need.   Physical Activity: Inactive (9/20/2024)    Exercise Vital Sign     Days of Exercise per Week: 0 days     Minutes of Exercise per Session: 0 min   Stress: Stress Concern Present (9/20/2024)    [employer] - Occupational Stress Questionnaire     Feeling of Stress : Very much   Housing Stability: Low Risk  (9/20/2024)    Housing Stability Vital Sign     Unable to Pay for Housing in the Last Year: No     Homeless in the Last Year: No     Patient Active Problem List   Diagnosis    Essential hypertension    Hyperlipidemia    Vasovagal syncope    Tobacco use disorder    Class 1 obesity due to excess calories in adult    Right sided weakness    Deficit in activities of daily living (ADL)    Cognitive communication disorder    Decreased activity tolerance    Decreased strength of lower extremity    Lacunar infarction    Major depressive disorder with single episode, in full remission    Chronic kidney disease, stage 3a    Mild vascular dementia with mood disturbance     Review of patient's allergies indicates:   Allergen Reactions    Enalapril Other (See Comments)     Ace cough. She doesn't recall this medication or a history of medication allergies/adverse reactions.       The following were reviewed at this visit: active problem list, medication list, allergies, family history, social history, and health maintenance.    Medications:  Current Outpatient Medications on File Prior to Visit   Medication Sig Dispense Refill    albuterol (VENTOLIN HFA) 90 mcg/actuation inhaler Inhale 2 puffs into the lungs every 6 (six) hours as needed for Wheezing. Rescue 18 g 0    amLODIPine (NORVASC) 10 MG tablet Take 1 tablet (10 mg total) by mouth once daily. 90 tablet 1    aspirin 81 MG Chew Take 1 tablet (81 mg total) by mouth once daily. 90 tablet 3    atorvastatin (LIPITOR) 80 MG tablet Take 1 tablet (80 mg total) by mouth  nightly. 90 tablet 3    busPIRone (BUSPAR) 5 MG Tab Take 2 tablets (10 mg total) by mouth 2 (two) times daily. 360 tablet 3    donepeziL (ARICEPT) 10 MG tablet Take 1 tablet (10 mg total) by mouth every evening. Take one half tablet for one week then as prescribed. 90 tablet 3    irbesartan-hydrochlorothiazide (AVALIDE) 150-12.5 mg per tablet TAKE 1 TABLET BY MOUTH ONCE  DAILY 90 tablet 2    lamoTRIgine (LAMICTAL) 100 MG tablet Take 1 tablet (100 mg total) by mouth 2 (two) times daily. 60 tablet 11    memantine (NAMENDA) 10 MG Tab Take 1 tablet (10 mg total) by mouth 2 (two) times daily. 180 tablet 3    multivitamin with minerals tablet       promethazine-dextromethorphan (PROMETHAZINE-DM) 6.25-15 mg/5 mL Syrp Take 5 mLs by mouth every 6 (six) hours as needed (cough). 180 mL 0    traZODone (DESYREL) 50 MG tablet Take 1 tablet (50 mg total) by mouth every evening. for 14 days 14 tablet 0     No current facility-administered medications on file prior to visit.       Medications have been reviewed and reconciled with patient at this visit.  Barriers to medications reviewed with patient.    Adverse reactions to current medications reviewed with patient..    Over the counter medications reviewed and reconciled with patient.    Exam:  Wt Readings from Last 3 Encounters:   12/02/24 73 kg (160 lb 15 oz)   11/14/24 72 kg (158 lb 11.7 oz)   10/09/24 72.7 kg (160 lb 4.4 oz)     Temp Readings from Last 3 Encounters:   12/02/24 96 °F (35.6 °C) (Tympanic)   11/14/24 96.5 °F (35.8 °C) (Tympanic)   05/01/24 96.8 °F (36 °C)     BP Readings from Last 3 Encounters:   12/02/24 134/78   11/14/24 (!) 146/72   09/20/24 120/66     Pulse Readings from Last 3 Encounters:   12/02/24 69   11/14/24 69   09/20/24 62     Body mass index is 28.51 kg/m².    Physical Exam  Nursing note reviewed.   HENT:      Head: Normocephalic and atraumatic.      Salivary Glands: Right salivary gland is not diffusely enlarged or tender. Left salivary gland is not  diffusely enlarged or tender.      Nose: Congestion and rhinorrhea present.      Right Sinus: No maxillary sinus tenderness or frontal sinus tenderness.      Left Sinus: No maxillary sinus tenderness or frontal sinus tenderness.      Mouth/Throat:      Pharynx: Postnasal drip present.   Cardiovascular:      Rate and Rhythm: Normal rate and regular rhythm.      Heart sounds: Normal heart sounds.   Pulmonary:      Effort: Pulmonary effort is normal. No respiratory distress.      Breath sounds: Normal breath sounds.   Neurological:      Mental Status: She is alert.   Psychiatric:         Mood and Affect: Mood normal.         Behavior: Behavior normal.         Thought Content: Thought content normal.         Judgment: Judgment normal.           Laboratory Reviewed ({Yes)  Lab Results   Component Value Date    WBC 5.29 05/01/2024    HGB 12.7 05/01/2024    HCT 40.1 05/01/2024     05/01/2024    CHOL 111 (L) 05/01/2024    TRIG 116 05/01/2024    HDL 50 05/01/2024    ALT 30 05/01/2024    AST 33 05/01/2024     05/01/2024    K 3.6 05/01/2024     05/01/2024    CREATININE 1.0 05/01/2024    BUN 17 05/01/2024    CO2 27 05/01/2024    TSH 1.511 10/26/2023    INR 1.0 10/27/2022    HGBA1C 5.5 05/01/2024       Rosa Isela was seen today for cough.    Diagnoses and all orders for this visit:    Sinus congestion  -     methylPREDNISolone (MEDROL DOSEPACK) 4 mg tablet; use as directed  -     montelukast (SINGULAIR) 10 mg tablet; Take 1 tablet (10 mg total) by mouth every evening.    Cough, unspecified type  -     methylPREDNISolone (MEDROL DOSEPACK) 4 mg tablet; use as directed  -     montelukast (SINGULAIR) 10 mg tablet; Take 1 tablet (10 mg total) by mouth every evening.    Essential hypertension  At visit, Blood pressure is at goal. Continue current medications      MCI (mild cognitive impairment)  Continue current treatment plan as previously prescribed with your  Neurologist     Tobacco use  Not interested in smoking  cessation at this time           Assessment & Plan    UPPER RESPIRATORY INFECTION:  - Assessed patient's cough, which started last Wednesday and has improved but persists.  - Considered steroid treatment due to prolonged cough affecting ribs, chest, and back.  - Evaluated effectiveness of current treatments including cough syrup and albuterol inhaler.  - Explained function of steroids in opening up bronchials and lungs to help stop cough.  - Started prednisone (steroid) - sent to [facility].  - Continued cough syrup.    ALLERGIC RHINITIS:  - Determined need for additional interventions to address potential sinus involvement and allergies.  - Discussed potential benefits of daily antihistamine use for drying up sinus drainage.  - Ms. Pate to use Flonase nasal spray as provided by family member.  - Started Singulair (antihistamine) - 30-day supply sent to [facility].  - Contact the office if patient needs a 90-day supply of Singulair sent to [facility].      - Continued albuterol inhaler as needed for cough     Visit today included increased complexity associated with the care of the episodic problem Cough  , which was addressed while instituting co-management of the longitudinal care of the patient due to the serious and/or complex managed problem(s) .    I have evaluated and discussed management associated with medical care services that serve as the continuing focal point for all needed health care services and/or with medical care services that are part of ongoing care related to my patient's single, serious condition or a complex condition(s).    I am providing ongoing care and I am the primary care provider for this patient, and they are being managed, monitored, and/or observed for their chronic conditions over time.     I have addressed their ongoing health maintenance requirements and needs for all health care services and reviewed co-management plans provided by specialty providers when  available.    Health Maintenance Due   Topic Date Due    RSV Vaccine (Age 60+ and Pregnant patients) (1 - Risk 60-74 years 1-dose series) Never done    Shingles Vaccine (2 of 2) 10/05/2021               Care Plan/Goals: Reviewed    Goals        Blood Pressure < 140/90      Exercise at least 150 minutes per week.      LDL CHOLESTEROL < 70      Take at least one BP reading per week at various times of the day             Follow up: No follow-ups on file.    After visit summary was printed and given to patient upon discharge today.  Patient goals and care plan are included in After Visit Summary.

## 2024-12-03 RX ORDER — MONTELUKAST SODIUM 10 MG/1
10 TABLET ORAL NIGHTLY
Qty: 90 TABLET | OUTPATIENT
Start: 2024-12-03

## 2024-12-03 NOTE — TELEPHONE ENCOUNTER
Refill Decision Note   Rosa Isela Pate  is requesting a refill authorization.    Brief Assessment and Rationale for Refill:   Quick Discontinue       Medication Therapy Plan:   E-Prescribing Status: Receipt confirmed by pharmacy (12/2/2024 11:55 AM CST)      Comments:     Note composed:1:42 AM 12/03/2024

## 2024-12-07 ENCOUNTER — PATIENT MESSAGE (OUTPATIENT)
Dept: INTERNAL MEDICINE | Facility: CLINIC | Age: 74
End: 2024-12-07
Payer: MEDICARE

## 2024-12-07 DIAGNOSIS — J20.9 ACUTE BRONCHITIS, UNSPECIFIED ORGANISM: ICD-10-CM

## 2024-12-09 DIAGNOSIS — J20.9 ACUTE BRONCHITIS, UNSPECIFIED ORGANISM: ICD-10-CM

## 2024-12-09 RX ORDER — ALBUTEROL SULFATE 90 UG/1
2 INHALANT RESPIRATORY (INHALATION) EVERY 6 HOURS PRN
Qty: 18 G | Refills: 0 | Status: SHIPPED | OUTPATIENT
Start: 2024-12-09 | End: 2024-12-09 | Stop reason: SDUPTHER

## 2024-12-09 RX ORDER — ALBUTEROL SULFATE 90 UG/1
2 INHALANT RESPIRATORY (INHALATION) EVERY 6 HOURS PRN
Qty: 18 G | Refills: 3 | Status: SHIPPED | OUTPATIENT
Start: 2024-12-09 | End: 2025-03-09

## 2024-12-09 NOTE — TELEPHONE ENCOUNTER
No care due was identified.  [facility] Embedded Care Due Messages. Reference number: [id number].   12/09/2024 8:13:04 AM CST

## 2024-12-12 ENCOUNTER — TELEPHONE (OUTPATIENT)
Dept: ADMINISTRATIVE | Facility: OTHER | Age: 74
End: 2024-12-12
Payer: MEDICARE

## 2024-12-12 ENCOUNTER — TELEPHONE (OUTPATIENT)
Dept: PSYCHIATRY | Facility: CLINIC | Age: 74
End: 2024-12-12
Payer: MEDICARE

## 2024-12-12 ENCOUNTER — PATIENT OUTREACH (OUTPATIENT)
Dept: ADMINISTRATIVE | Facility: OTHER | Age: 74
End: 2024-12-12
Payer: MEDICARE

## 2024-12-12 NOTE — TELEPHONE ENCOUNTER
Good afternoon,      Mrs Pate would like would like to reschedule missed appointment from 10/31/24.  I have provided phone number, [phone], to the patient to call to reschedule.  The patient states she is very depressed.  She denies SI/HI.  Would you contact the patient to schedule an appointment?      Thank you,    Evelyn Thompson LPN Care Coordinator  [phone]

## 2024-12-12 NOTE — PROGRESS NOTES
CHW - Follow Up    This LPN completed a follow up visit with patient via telephone today.  Pt/Caregiver reported: Identity verified.  Patient states she did not check her BP or get an appointment with Migdalia Grady LCSW.  Informed patient that the office attempted to contact her on 11/21/24 and left a voicemail  requesting a return call.  Instructed patient to contact Ms. Grady's office to make an appointment.  Provided patient phone number, [phone] to the office.  Patient states she is feeling depressed, but she denies SI/HI.  Educated patient on calling [phone] if she needs someone to talk with.    Offered to call the patient in 2 weeks to follow up and she states she will call this LPN in 2 weeks.  Patient verbalized understanding to all information and instructions.  Community Health Worker provided: Provided patient with phone number to Migdalia Grady's office.  Sent In Basket message to Migdalia Grady LCSW requesting the office contact patient to schedule an appointment.   Follow up required: Yes  Follow-up Outreach - Due: 1/2/2025

## 2024-12-12 NOTE — TELEPHONE ENCOUNTER
called pt scheduled . 3/19 np appointment with Annette Mcallister pt refused to make F/u appointment after new patient appointment because pt state that she may not keep the np t appointment..

## 2025-01-02 ENCOUNTER — PATIENT MESSAGE (OUTPATIENT)
Dept: INTERNAL MEDICINE | Facility: CLINIC | Age: 75
End: 2025-01-02
Payer: MEDICARE

## 2025-01-03 ENCOUNTER — PATIENT OUTREACH (OUTPATIENT)
Dept: ADMINISTRATIVE | Facility: OTHER | Age: 75
End: 2025-01-03
Payer: MEDICARE

## 2025-01-03 ENCOUNTER — PATIENT MESSAGE (OUTPATIENT)
Dept: INTERNAL MEDICINE | Facility: CLINIC | Age: 75
End: 2025-01-03
Payer: MEDICARE

## 2025-01-03 DIAGNOSIS — R41.3 MEMORY LOSS: ICD-10-CM

## 2025-01-03 DIAGNOSIS — F41.9 ANXIETY: ICD-10-CM

## 2025-01-03 DIAGNOSIS — F02.811 MAJOR NEUROCOGNITIVE DISORDER DUE TO ANOTHER MEDICAL CONDITION, WITH AGITATION: ICD-10-CM

## 2025-01-03 NOTE — TELEPHONE ENCOUNTER
No care due was identified.  [facility] Embedded Care Due Messages. Reference number: [id number].   1/03/2025 1:35:28 PM CST

## 2025-01-04 NOTE — TELEPHONE ENCOUNTER
Refill Routing Note   Medication(s) are not appropriate for processing by [facility] for the following reason(s):      Medication outside of protocol    [facility] action(s):  Route Care Due:  None identified            Appointments  past 12m or future 3m with PCP    Date Provider   Last Visit   11/14/2024 Sherrell Drummond MD   Next Visit   5/15/2025 Sherrell Drummond MD   ED visits in past 90 days: 0        Note composed:5:08 PM 01/04/2025

## 2025-01-05 RX ORDER — BUSPIRONE HYDROCHLORIDE 5 MG/1
10 TABLET ORAL 2 TIMES DAILY
Qty: 10 TABLET | Refills: 0 | Status: SHIPPED | OUTPATIENT
Start: 2025-01-05 | End: 2025-01-10

## 2025-01-08 DIAGNOSIS — R05.9 COUGH, UNSPECIFIED TYPE: ICD-10-CM

## 2025-01-08 DIAGNOSIS — R09.81 SINUS CONGESTION: ICD-10-CM

## 2025-01-08 RX ORDER — MONTELUKAST SODIUM 10 MG/1
10 TABLET ORAL NIGHTLY
Qty: 90 TABLET | Refills: 3 | Status: SHIPPED | OUTPATIENT
Start: 2025-01-08

## 2025-01-08 NOTE — TELEPHONE ENCOUNTER
No care due was identified.  [facility] Embedded Care Due Messages. Reference number: [id number].   1/08/2025 1:56:13 PM CST

## 2025-01-08 NOTE — TELEPHONE ENCOUNTER
Refill Routing Note   Medication(s) are not appropriate for processing by [facility] for the following reason(s):        New or recently adjusted medication  No active prescription written by provider    [facility] action(s):  Defer               Appointments  past 12m or future 3m with PCP    Date Provider   Last Visit   11/14/2024 Sherrell Drummond MD   Next Visit   5/15/2025 Sherrell Drummond MD   ED visits in past 90 days: 0        Note composed:2:02 PM 01/08/2025

## 2025-01-10 ENCOUNTER — PATIENT OUTREACH (OUTPATIENT)
Dept: ADMINISTRATIVE | Facility: OTHER | Age: 75
End: 2025-01-10
Payer: MEDICARE

## 2025-01-17 ENCOUNTER — PATIENT OUTREACH (OUTPATIENT)
Dept: ADMINISTRATIVE | Facility: OTHER | Age: 75
End: 2025-01-17
Payer: MEDICARE

## 2025-01-17 NOTE — PROGRESS NOTES
CHW - Follow Up    This LPN completed a follow up visit with patient via telephone today.  Pt/Caregiver reported: Identity verified.  Patient states she is doing well.  BP did not take, just got out of bed.  Patient denies SI/HI.  She states she wants to go back back to work, but is having memory problems.  She states she is tired of staying  home.  Suggested patient contact [facility] on Aging to inquire about their [facility].  Provided phone number, [phone].  Patient voiced appreciation for the information.  Patient verbalized understanding to all information and instructions.  LPN provided: see above  Follow up required:   Follow-up Outreach - Due: 2/7/2025

## 2025-01-31 ENCOUNTER — LAB VISIT (OUTPATIENT)
Dept: LAB | Facility: HOSPITAL | Age: 75
End: 2025-01-31
Attending: NURSE PRACTITIONER
Payer: MEDICARE

## 2025-01-31 ENCOUNTER — OFFICE VISIT (OUTPATIENT)
Dept: NEUROLOGY | Facility: CLINIC | Age: 75
End: 2025-01-31
Payer: MEDICARE

## 2025-01-31 VITALS
BODY MASS INDEX: 28.64 KG/M2 | RESPIRATION RATE: 16 BRPM | WEIGHT: 161.63 LBS | SYSTOLIC BLOOD PRESSURE: 131 MMHG | DIASTOLIC BLOOD PRESSURE: 69 MMHG | HEART RATE: 81 BPM | HEIGHT: 63 IN

## 2025-01-31 DIAGNOSIS — G31.84 MCI (MILD COGNITIVE IMPAIRMENT) WITH MEMORY LOSS: ICD-10-CM

## 2025-01-31 DIAGNOSIS — R41.841 COGNITIVE COMMUNICATION DISORDER: ICD-10-CM

## 2025-01-31 DIAGNOSIS — E66.811 CLASS 1 OBESITY DUE TO EXCESS CALORIES WITH SERIOUS COMORBIDITY AND BODY MASS INDEX (BMI) OF 30.0 TO 30.9 IN ADULT: ICD-10-CM

## 2025-01-31 DIAGNOSIS — Z63.8 STRESS DUE TO FAMILY TENSION: ICD-10-CM

## 2025-01-31 DIAGNOSIS — I63.81 LACUNAR INFARCTION: ICD-10-CM

## 2025-01-31 DIAGNOSIS — F32.5 MAJOR DEPRESSIVE DISORDER WITH SINGLE EPISODE, IN FULL REMISSION: ICD-10-CM

## 2025-01-31 DIAGNOSIS — F02.811 MAJOR NEUROCOGNITIVE DISORDER DUE TO ANOTHER MEDICAL CONDITION, WITH AGITATION: Primary | ICD-10-CM

## 2025-01-31 DIAGNOSIS — R29.898 DECREASED STRENGTH OF LOWER EXTREMITY: ICD-10-CM

## 2025-01-31 DIAGNOSIS — F41.9 ANXIETY: ICD-10-CM

## 2025-01-31 DIAGNOSIS — R45.1 AGITATION: ICD-10-CM

## 2025-01-31 DIAGNOSIS — E78.5 HYPERLIPIDEMIA, UNSPECIFIED HYPERLIPIDEMIA TYPE: ICD-10-CM

## 2025-01-31 DIAGNOSIS — E66.09 CLASS 1 OBESITY DUE TO EXCESS CALORIES WITH SERIOUS COMORBIDITY AND BODY MASS INDEX (BMI) OF 30.0 TO 30.9 IN ADULT: ICD-10-CM

## 2025-01-31 DIAGNOSIS — R53.1 RIGHT SIDED WEAKNESS: ICD-10-CM

## 2025-01-31 DIAGNOSIS — R41.3 MEMORY LOSS: ICD-10-CM

## 2025-01-31 DIAGNOSIS — R41.3 OTHER AMNESIA: ICD-10-CM

## 2025-01-31 PROCEDURE — 83520 IMMUNOASSAY QUANT NOS NONAB: CPT | Performed by: NURSE PRACTITIONER

## 2025-01-31 PROCEDURE — 1126F AMNT PAIN NOTED NONE PRSNT: CPT | Mod: CPTII,S$GLB,, | Performed by: NURSE PRACTITIONER

## 2025-01-31 PROCEDURE — 99999 PR PBB SHADOW E&M-EST. PATIENT-LVL V: CPT | Mod: PBBFAC,,, | Performed by: NURSE PRACTITIONER

## 2025-01-31 PROCEDURE — 36415 COLL VENOUS BLD VENIPUNCTURE: CPT | Performed by: NURSE PRACTITIONER

## 2025-01-31 PROCEDURE — 84393 TAU PHOSPHORYLATED EA: CPT | Performed by: NURSE PRACTITIONER

## 2025-01-31 PROCEDURE — 1160F RVW MEDS BY RX/DR IN RCRD: CPT | Mod: CPTII,S$GLB,, | Performed by: NURSE PRACTITIONER

## 2025-01-31 PROCEDURE — 3075F SYST BP GE 130 - 139MM HG: CPT | Mod: CPTII,S$GLB,, | Performed by: NURSE PRACTITIONER

## 2025-01-31 PROCEDURE — 3288F FALL RISK ASSESSMENT DOCD: CPT | Mod: CPTII,S$GLB,, | Performed by: NURSE PRACTITIONER

## 2025-01-31 PROCEDURE — 99214 OFFICE O/P EST MOD 30 MIN: CPT | Mod: S$GLB,,, | Performed by: NURSE PRACTITIONER

## 2025-01-31 PROCEDURE — 1159F MED LIST DOCD IN RCRD: CPT | Mod: CPTII,S$GLB,, | Performed by: NURSE PRACTITIONER

## 2025-01-31 PROCEDURE — 3008F BODY MASS INDEX DOCD: CPT | Mod: CPTII,S$GLB,, | Performed by: NURSE PRACTITIONER

## 2025-01-31 PROCEDURE — 1101F PT FALLS ASSESS-DOCD LE1/YR: CPT | Mod: CPTII,S$GLB,, | Performed by: NURSE PRACTITIONER

## 2025-01-31 PROCEDURE — 3078F DIAST BP <80 MM HG: CPT | Mod: CPTII,S$GLB,, | Performed by: NURSE PRACTITIONER

## 2025-01-31 RX ORDER — DONEPEZIL HYDROCHLORIDE 10 MG/1
10 TABLET, FILM COATED ORAL NIGHTLY
Qty: 90 TABLET | Refills: 3 | Status: SHIPPED | OUTPATIENT
Start: 2025-01-31 | End: 2026-01-31

## 2025-01-31 RX ORDER — TRAZODONE HYDROCHLORIDE 50 MG/1
50 TABLET ORAL NIGHTLY
Qty: 14 TABLET | Refills: 0 | Status: SHIPPED | OUTPATIENT
Start: 2025-01-31 | End: 2025-02-14

## 2025-01-31 RX ORDER — ATORVASTATIN CALCIUM 80 MG/1
80 TABLET, FILM COATED ORAL NIGHTLY
Qty: 90 TABLET | Refills: 3 | Status: SHIPPED | OUTPATIENT
Start: 2025-01-31

## 2025-01-31 RX ORDER — MEMANTINE HYDROCHLORIDE 10 MG/1
10 TABLET ORAL 2 TIMES DAILY
Qty: 180 TABLET | Refills: 3 | Status: SHIPPED | OUTPATIENT
Start: 2025-01-31 | End: 2026-01-26

## 2025-01-31 RX ORDER — BUSPIRONE HYDROCHLORIDE 10 MG/1
10 TABLET ORAL 2 TIMES DAILY
Qty: 60 TABLET | Refills: 11 | Status: SHIPPED | OUTPATIENT
Start: 2025-01-31 | End: 2026-01-31

## 2025-01-31 SDOH — SOCIAL DETERMINANTS OF HEALTH (SDOH): OTHER SPECIFIED PROBLEMS RELATED TO PRIMARY SUPPORT GROUP: Z63.8

## 2025-01-31 NOTE — PROGRESS NOTES
Subjective:       Patient ID: Rosa Isela Pate is a 74 y.o. female.    Chief Complaint: MCI (mild cognitive impairment) with memory loss      Referred by: Dr. Drummond PCP     HPI The patient is here for memory loss.     The patient is presenting with memory changes that began in  2020 after a stroke per spouse. The patient states she was at work and she couldn't recall her password and work related things to do, so her boss instructed her to go to the doctor and she states she was told she had a stroke. She was started on ASA. Stroke risk factors HLD, HTN, Heavy Smoker. The main problems the patient has are related to progressive short term memory loss. For example, the patient would repeat the same question repeatedly. The patient excessively forgets where placed certain things such as keys, unable to recall how much money she has spent, forget things told to her. The patient also started forgetting names such as grand kids names. The patient is driving, she admits to getting lost going to familiar places. No confusion around and inside the house. Has trouble remembering the date and time, keeping up with medications and appointments and patient is able to recall major holidays and unaware of political changes reports that current president is [employer], (However it is President Reid 2023) . The patient sister is handling finances. The patient is still independent with ADLs. Patient has some agitation, and no aggression. No hallucinations or delusions. No seizures. No language problems. No problems handling tools. No history of headaches. No history of hypothyroidism. No history of alcoholism. Patient is a heavy smoker 10 or more per day, No history of B12 deficiency. Chronic depression. Reports that she is not happy with living situation. She has marital conflict, patient states that her relationship is not what it use to be. No history of bipolar disorder. No history of Untreated Syphilis.  No history of HIV  infection. No toxic exposures.  No history of traumatic brain injury. No tremors or abnormal movements. No falls or instability. Urgency urinary incontinence. Difficulty with sleep and good appetite. No family history of dementia.       INTERVAL HISTORY 01-: Patient present for memory management. unaccompanied. Patient doing well tolerating Namenda 10 mg BID and Donepezil/Aricept 10 mg QHS no side effects. Tolerating  mg po BID for agitation treatment is helpful per patient spouse not present. Buspar 10 mg po BID.  Patient states she is taking Trazodone 50 mg po HS for sleep, states treatment is helpful. No cognitive declined noted, no recent falls, no syncope noted. Refer to psychologist for counseling increased family stress.  Patient and spouse agree to plan of care. Recommend PT to evaluate and treat.        Review of Systems   Unable to perform ROS: Dementia   Constitutional: Negative.    HENT: Negative.     Eyes: Negative.    Respiratory: Negative.     Cardiovascular: Negative.    Gastrointestinal: Negative.    Endocrine: Negative.    Genitourinary: Negative.    Musculoskeletal:  Positive for arthralgias.   Skin: Negative.    Allergic/Immunologic: Negative.    Neurological: Negative.    Hematological: Negative.    Psychiatric/Behavioral:  Positive for agitation, confusion, decreased concentration, dysphoric mood and sleep disturbance.                  Current Outpatient Medications:     albuterol (VENTOLIN HFA) 90 mcg/actuation inhaler, Inhale 2 puffs into the lungs every 6 (six) hours as needed for Wheezing. Rescue, Disp: 18 g, Rfl: 3    amLODIPine (NORVASC) 10 MG tablet, Take 1 tablet (10 mg total) by mouth once daily., Disp: 90 tablet, Rfl: 1    aspirin 81 MG Chew, Take 1 tablet (81 mg total) by mouth once daily., Disp: 90 tablet, Rfl: 3    atorvastatin (LIPITOR) 80 MG tablet, TAKE 1 TABLET BY MOUTH EVERY  NIGHT, Disp: 90 tablet, Rfl: 3    irbesartan-hydrochlorothiazide (AVALIDE) 150-12.5 mg  per tablet, TAKE 1 TABLET BY MOUTH ONCE  DAILY, Disp: 90 tablet, Rfl: 2    lamoTRIgine (LAMICTAL) 100 MG tablet, Take 1 tablet (100 mg total) by mouth 2 (two) times daily., Disp: 60 tablet, Rfl: 11    montelukast (SINGULAIR) 10 mg tablet, TAKE 1 TABLET(10 MG) BY MOUTH EVERY EVENING, Disp: 90 tablet, Rfl: 3    multivitamin with minerals tablet, , Disp: , Rfl:     busPIRone (BUSPAR) 10 MG tablet, Take 1 tablet (10 mg total) by mouth 2 (two) times daily., Disp: 60 tablet, Rfl: 11    donepeziL (ARICEPT) 10 MG tablet, Take 1 tablet (10 mg total) by mouth every evening. Take one half tablet for one week then as prescribed., Disp: 90 tablet, Rfl: 3    memantine (NAMENDA) 10 MG Tab, Take 1 tablet (10 mg total) by mouth 2 (two) times daily., Disp: 180 tablet, Rfl: 3    traZODone (DESYREL) 50 MG tablet, Take 1 tablet (50 mg total) by mouth every evening. for 14 days, Disp: 14 tablet, Rfl: 0  Past Medical History:   Diagnosis Date    Chronic kidney disease, stage 3a 04/20/2023    Depression     Hyperlipidemia     Hypertension     Obesity     Stroke     Tobacco dependence      Past Surgical History:   Procedure Laterality Date    CHOLECYSTECTOMY      removal    COLONOSCOPY N/A 11/1/2017    Procedure: COLONOSCOPY;  Surgeon: Francisco Javier Bai MD;  Location: [facility] ENDO;  Service: Endoscopy;  Laterality: N/A;    COLONOSCOPY N/A 6/26/2023    Procedure: COLONOSCOPY;  Surgeon: Vicki Krishna MD;  Location: [facility] ENDO;  Service: Endoscopy;  Laterality: N/A;    HYSTERECTOMY      MYOMECTOMY      removal    OOPHORECTOMY      TUBAL LIGATION       Social History     Socioeconomic History    Marital status:    Tobacco Use    Smoking status: Every Day     Current packs/day: 1.00     Average packs/day: 1 pack/day for 51.0 years (51.0 ttl pk-yrs)     Types: Cigarettes    Smokeless tobacco: Never   Substance and Sexual Activity    Alcohol use: Not Currently     Comment: occ    Drug use: No    Sexual activity: Not Currently      Partners: Male     Social Drivers of Health     Financial Resource Strain: High Risk (9/20/2024)    Overall Financial Resource Strain (CARDIA)     Difficulty of Paying Living Expenses: Very hard   Food Insecurity: Food Insecurity Present (9/20/2024)    Hunger Vital Sign     Worried About Running Out of Food in the Last Year: Sometimes true     Ran Out of Food in the Last Year: Never true   Transportation Needs: Unmet Transportation Needs (10/1/2024)    TRANSPORTATION NEEDS     Transportation : Yes, it has kept me from non-medical meetings, appointments, work or from getting things that I need.   Physical Activity: Inactive (9/20/2024)    Exercise Vital Sign     Days of Exercise per Week: 0 days     Minutes of Exercise per Session: 0 min   Stress: Stress Concern Present (9/20/2024)    [employer] - Occupational Stress Questionnaire     Feeling of Stress : Very much   Housing Stability: Low Risk  (9/20/2024)    Housing Stability Vital Sign     Unable to Pay for Housing in the Last Year: No     Homeless in the Last Year: No             Past/Current Medical/Surgical History, Past/Current Social History, Past/Current Family History and Past/Current Medications were reviewed in detail.        Objective:           VITAL SIGNS WERE REVIEWED      GENERAL APPEARANCE:     The patient looks comfortable.    Body habitus is normal BMI 28.27 KG     No signs of respiratory distress.    Normal breathing pattern.    No dysmorphic features    Normal eye contact.     GENERAL MEDICAL EXAM:    HEENT:  Head is atraumatic normocephalic.     No tender temporal arteries. Fundoscopic (Ophthalmoscopic) exam showed no disc edema.      Neck and Axillae: No JVD. No visible lesions.    No carotid bruits. No thyromegaly. No lymphadenopathy.    Cardiopulmonary: No cyanosis. No tachypnea. Normal respiratory effort.    Gastrointestinal/Urogenital:  No jaundice. No stomas or lesions. No visible hernias. No catheters.      Abdomen is soft non-tender. No masses or organomegaly.    Skin, Hair and Nails: No pathognonomic skin rash. No neurofibromatosis. No visible lesions.No stigmata of autoimmune disease. No clubbing.    Skin is warm and moist. No palpable masses.    Limbs: No varicose veins. No visible swelling.    No palpable edema. Pulses are symmetric. Pedal pulses are palpable.      Muskoskeletal: No visible deformities.No visible lesions.    No spine tenderness. No signs of longstanding neuropathy. No dislocations or fractures.            Neurologic Exam     Mental Status   Oriented to person.   Oriented to place. Oriented to country.   Disoriented to year, month and date.   Follows 2 step commands.   Attention: decreased. Concentration: decreased.   Speech: speech is normal and not slurred   Level of consciousness: alert  Knowledge: inconsistent with education and poor. Able to perform simple calculations.   Unable to name object. Able to read. Able to repeat. Able to write. Abnormal comprehension.     Cranial Nerves     CN II   Visual fields full to confrontation.   Visual acuity: decreased  Right visual field deficit: none  Left visual field deficit: none     CN III, IV, VI   Pupils are equal, round, and reactive to light.  Extraocular motions are normal.   Right pupil: Size: 2 mm. Shape: regular. Reactivity: brisk. Consensual response: intact. Accommodation: intact.   Left pupil: Size: 2 mm. Shape: regular. Reactivity: brisk. Consensual response: intact. Accommodation: intact.   CN III: no CN III palsy  CN VI: no CN VI palsy  Nystagmus: none   Diplopia: none  Ophthalmoparesis: none  Upgaze: normal  Downgaze: normal  Conjugate gaze: present  Vestibulo-ocular reflex: present    CN V   Facial sensation intact.   Right facial sensation deficit: none  Left facial sensation deficit: none    CN VII   Right facial weakness: none  Left facial weakness: none    CN VIII   CN VIII normal.   Hearing: intact    CN IX, X   CN IX normal.    CN X normal.   Palate: symmetric    CN XI   CN XI normal.   Right sternocleidomastoid strength: normal  Left sternocleidomastoid strength: normal  Right trapezius strength: normal  Left trapezius strength: normal    CN XII   CN XII normal.   Tongue: not atrophic  Fasciculations: absent  Tongue deviation: none    Motor Exam   Muscle bulk: normal  Overall muscle tone: normal  Right arm tone: normal  Left arm tone: normal  Right arm pronator drift: absent  Left arm pronator drift: absent  Right leg tone: normal  Left leg tone: normal    Strength   Strength 5/5 throughout.   Right neck flexion: 5/5  Left neck flexion: 5/5  Right neck extension: 5/5  Left neck extension: 5/5  Right deltoid: 5/5  Left deltoid: 5/5  Right biceps: 5/5  Left biceps: 5/5  Right triceps: 5/5  Left triceps: 5/5  Right wrist flexion: 5/5  Left wrist flexion: 5/5  Right wrist extension: 5/5  Left wrist extension: 5/5  Right interossei: 5/5  Left interossei: 5/5  Right iliopsoas: 5/5  Left iliopsoas: 5/5  Right quadriceps: 5/5  Left quadriceps: 5/5  Right hamstrin/5  Left hamstrin/5  Right glutei: 5/5  Left glutei: 5/5  Right anterior tibial: 5/5  Left anterior tibial: 5/5  Right posterior tibial: 5/5  Left posterior tibial: 5/5  Right peroneal: 5/5  Left peroneal: 5/5  Right gastroc: 5/5  Left gastroc: 5/5    Sensory Exam   Light touch normal.   Right arm light touch: normal  Left arm light touch: normal  Right leg light touch: normal  Left leg light touch: normal  Vibration normal.   Right arm vibration: normal  Left arm vibration: normal  Right leg vibration: normal  Left leg vibration: normal  Proprioception normal.   Right arm proprioception: normal  Left arm proprioception: normal  Right leg proprioception: normal  Left leg proprioception: normal  Pinprick normal.   Right arm pinprick: normal  Left arm pinprick: normal  Right leg pinprick: normal  Left leg pinprick: normal  Sensory deficit distribution on left: lateral cutaneous  thigh  Graphesthesia: normal  Stereognosis: normal    Gait, Coordination, and Reflexes     Gait  Gait: normal    Coordination   Romberg: negative  Finger to nose coordination: normal  Heel to shin coordination: normal  Tandem walking coordination: normal    Tremor   Resting tremor: absent  Intention tremor: absent  Action tremor: absent    Reflexes   Right brachioradialis: 2+  Left brachioradialis: 2+  Right biceps: 2+  Left biceps: 2+  Right triceps: 2+  Left triceps: 2+  Right patellar: 2+  Left patellar: 2+  Right achilles: 1+  Left achilles: 1+  Right plantar: normal  Left plantar: normal  Right Blum: absent  Left Blum: absent  Right ankle clonus: absent  Left ankle clonus: absent  Right pendular knee jerk: absent  Left pendular knee jerk: absent            JOS[facility] LUIS COGNITIVE ASSESSMENT (MOCA) TOTAL SCORE      MILD DEMENTIA 20-25    Education [employer] College       DATE 10-       TOTAL SCORE 23/30       VISUOSPATIAL EXECUTIVE (5) 4       NAMING (3) 3       ATTENTION (6) 6       LANGUAGE (3) 2       ABSTRACTION(2) 2       RECALL (5) 0       ORIENTATION (6) 6           Lab Results   Component Value Date    WBC 5.29 05/01/2024    HGB 12.7 05/01/2024    HCT 40.1 05/01/2024    MCV 96 05/01/2024     05/01/2024     Sodium   Date Value Ref Range Status   05/01/2024 142 136 - 145 mmol/L Final     Potassium   Date Value Ref Range Status   05/01/2024 3.6 3.5 - 5.1 mmol/L Final     Chloride   Date Value Ref Range Status   05/01/2024 106 95 - 110 mmol/L Final     CO2   Date Value Ref Range Status   05/01/2024 27 23 - 29 mmol/L Final     Glucose   Date Value Ref Range Status   05/01/2024 99 70 - 110 mg/dL Final     BUN   Date Value Ref Range Status   05/01/2024 17 8 - 23 mg/dL Final     Creatinine   Date Value Ref Range Status   05/01/2024 1.0 0.5 - 1.4 mg/dL Final     Calcium   Date Value Ref Range Status   05/01/2024 9.8 8.7 - 10.5 mg/dL Final     Total Protein   Date Value Ref Range Status   05/01/2024 7.3  6.0 - 8.4 g/dL Final     Albumin   Date Value Ref Range Status   05/01/2024 3.9 3.5 - 5.2 g/dL Final     Total Bilirubin   Date Value Ref Range Status   05/01/2024 0.5 0.1 - 1.0 mg/dL Final     Comment:     For infants and newborns, interpretation of results should be based  on gestational age, weight and in agreement with clinical  observations.    Premature Infant recommended reference ranges:  Up to 24 hours.............<8.0 mg/dL  Up to 48 hours............<12.0 mg/dL  3-5 days..................<15.0 mg/dL  6-29 days.................<15.0 mg/dL       Alkaline Phosphatase   Date Value Ref Range Status   05/01/2024 60 55 - 135 U/L Final     AST   Date Value Ref Range Status   05/01/2024 33 10 - 40 U/L Final     ALT   Date Value Ref Range Status   05/01/2024 30 10 - 44 U/L Final     Anion Gap   Date Value Ref Range Status   05/01/2024 9 8 - 16 mmol/L Final     eGFR if    Date Value Ref Range Status   05/30/2022 58 (A) >60 mL/min/1.73 m^2 Final     eGFR if non    Date Value Ref Range Status   05/30/2022 51 (A) >60 mL/min/1.73 m^2 Final     Comment:     Calculation used to obtain the estimated glomerular filtration  rate (eGFR) is the CKD-EPI equation.        Lab Results   Component Value Date    [id number] 636 10/26/2023     Lab Results   Component Value Date    TSH 1.511 10/26/2023     10-    UA neg for UTI, trace protein noted recommend increase hydration    DIEGO +, DIEGO Homogenous, DIEGO Titer 1:80, RPR Neg, FA level 17.7, Vitamin B 12 636 NL, HIV neg, HC level 10.1 NL, TSH level 1.511 NL,     Labs WNL except DIEGO+ recommend follow up with Rheumatology for further evaluation     04-    MRI BRAIN WO    Examination suggestive of prior small left thalamus lacunar infarction.       12-    MRI Brain WO    There is age-appropriate atrophy. There are multiple FLAIR hyperintensities throughout the periventricular and subcortical white matter bilaterally.     There is a  focus of restricted diffusion in the anterior superior left thalamus measuring approximately 1.6 cm with slight ADC hypointensity consistent with a subacute infarct.     There is no intracranial hemorrhage. There is no midline shift or hydrocephalus.     Normal T2 flow voids are present at the skull base. The vertebral basilar system is diminutive. The visualized paranasal sinuses are well aerated. The orbital soft tissues are normal.    No acute intracranial process seen.      12-    MRA Brain     MRA Brain NL       MRI Brain WO:    11-    No acute intracranial abnormality.     Interval appearance of a tiny, remote left centrum semiovale infarct as compared to the prior MRI 04/23/2021.     Unchanged small remote infarcts of the left thalamus, right cerebellum and vermis.     Minor chronic microvascular ischemic changes.      NEUROPSYCHOLOGY EVALUATION:     01-  1. Mild vascular dementia with mood disturbance        2. Major depressive disorder, recurrent episode with anxious distress        3. Psychosocial stressors        Reviewed the neuroimaging independently       Assessment:       1. MCI (mild cognitive impairment) with memory loss    2. Anxiety    3. Memory loss    4. 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 Cognitive communication disorder    6. Lacunar infarction    7. Right sided weakness    8. Major depressive disorder with single episode, in full remission    9. Other amnesia              Modified [place] Score (m-RS)      0  The patient has no residual symptoms.    1  The patient has no significant disability; able to carry out all pre-stroke activities.    2  The patient has slight disability; unable to carry out all pre-stroke activities but able to look after self without daily help.    3  The patient has moderate disability; requiring some external help but able to walk without the assistance of another individual.    4  The patient has  moderately severe disability; unable to walk or attend to bodily functions without assistance of another individual.    5  The patient has severe disability; bedridden, incontinent, requires continuous care.    6  The patient has  (during the hospital stay or after discharge from the hospital).    Plan:         MAJOR MILD NEUROCOGNITIVE DISORDER DUE POSSIBLE VASCULAR DEMENTIA OR DUE TO ANOTHER CONDITION WITH AGITATION/ HX OF STROKE MDD/CODIE/ TOBACCO USE/         EVALUATION       DriveAble to assess the cognitive aspects of driving.     Follow up with Psychiatry for optimization of CODIE/MDD     Follow up with Psychologist to evaluate and treat    Evaluate P-tau 181, 217       HX OF STROKE (HTN, HLD SMOKER)      ASA 81 mg QD      Vascular Risk Factors (VRFs) stratification (BP, BS, BC control and Smoking Cessation) is the mainstay of stroke prevention (>90%). The benefit of antiplatelets is < 20% stroke risk reduction.         Healthy diet and exercise    Avoid driving.    Call 911 if any SUDDEN:   Weakness  Numbness  Slurring of speech  Speech difficulty   Vertigo  Loss of balance  Loss of vision  Loss of hearing  Double vision  Trouble swallowing  Trouble breathing  Facial drooping      DISEASE-MODIFYING AGENTS     Explained to the patient and family that medications are intended to slow down the progression (in the early stages of the disease) and not stop it or reverse the disease. The disease is relentless, progressive and so far, cannot be controlled. Progression includes Cognitive decline, Behavioral disturbances, and Motor decline as well.     I counseled the patient and family that the rate of progression is extremely variable, and the average (10 years) is an inaccurate measure.     CLASSES:    NMDA RECEPTOR ANTAGONISTS    Continue memantine/Namenda 10 mg BID      CHOLINESTERASE INHIBITORS (CEI)    Continue donepezil/Aricept 10 mg QHS      If GI symptoms become an issue will try rivastigmine/Exelon  patches. Will obtain EKG before and after Aricept to verify effects on QT interval!  (Other formulations Adlarity TTS 5/10 mg weekly). Patches are convenient, bypass the GI system but have unintended consequences of being taken off prematurely or being duplicated accidentally.       MISCELLANEOUS     [facility] study regarding Sildenafil (Viagra) impact on dementia showed possible benefit. Will wait for high quality prospective double-blinded placebo-controlled trials to make any proper recommendations.     Recommended against the use of OTC non-FDA evaluated supplements and claims.         SYMPTOMATIC MANAGEMENT-BEHAVIORAL SYMPTOMS AND NON-COGNITIVE SYMPTOMS       Continue Trazodone 50 mg po HS     ANTIDEPRESSANTS CLASS MEDICATIONS    Follow up with psychiatry for behavioral management     Continue  mg po BID for agitation     Continue  Bupsar 10 mg po BID anxiety symptoms.     HOME CARE    Falling Down Precautions. Gait is affected by the disease as well.     Avoid driving and access to firearms     Incremental 24/Care     Help with finances and decision making.    Join support group.    Proofing the house and use labeling.    Avoid antihistamines and anticholinergics.    Avoid changing routine.    Use written reminders.    Avoid multitasking.    Healthy diet, exercise (physical and cognitive).    Good sleep hygiene.        HERE ARE 10 WAYS TO REDUCE RISK OF DEMENTIA AND SLOW DOWN THE PROGRESSION OF DEMENTIA        1.Be physically active.    2. Avoid smoking and alcohol consumption.    3. Track your numbers. Keep your blood pressure, cholesterol, blood sugar and weight within recommended ranges.    4. Stay socially connected.    5. Make healthy food choices. Eat a well-balance and healthy diet that rich in cereals, fish, legumes, and vegetables.    6. Reduce stress.     7. Challenge your brain by trying something new, playing games, or learning a new language.    8. Take care of your hearing. Avoid  being continuously exposed to loud sounds and wear a hearing aid if hearing does become a problem.    9. Lower risk of falls. Consider installing handrails on all stairs and grab bars in bathrooms.    10. Reduce your exposure to air pollution, such as exposure to exhaust in heavy traffic.         CAREGIVERS COUNSELING     Recommend reading the following books:     The 36-Hour Day and there are many online and printed resources to help caregivers as well.     Alzheimer's Through the Stages.     Dementia with G.R.A.C.E.            PREVENTION OF DELIRIUM       1. Good hydration and avoid electrolyte imbalance  2. Recognize and treat infections immediately especially UTI.  3. Bladder emptying and prevent constipation.   4. Provide stimulating activities and familiar objects  5. Use eyeglasses and hearing aids if needed.   6. Use simple and regular communication about people, current place, and time  7. Mobility and range-of-motion exercises  8. Reduce noise, lighting and avoid sleep interruptions  9. Non-narcotic pain management.  10.Nondrug treatment for sleep problems or anxiety  11. Avoid antihistamines.  12. Avoid narcotics.  13. Avoid benzodiazepines.            HELPFUL STRATEGIES FOR THE FAMILY AND CAREGIVERS        BEHAVIORAL MANAGEMENT STRATEGIES     Remember that you cannot reason with acute psychosis or confusion. Unless there is an immediate safety issue, there is no need to challenge or correct the person.  For example, if a pt is hallucinating, do not argue with the person that what they are seeing is not real. If they are expressing paranoia, empathize gently with their fear, and attempt to redirect them to a different activity.   If the person is particularly stuck on a topic or activity, as long as the activity is safe and is not worsening their agitation, you don't need to intervene.     Communicate calmly, simply, and concisely    Reassure the person that they are safe and you are here to help  Try not  "to express irritation or anger  Speak quietly and calmly, do not shout or threaten the person. Avoid provocation.   Establish verbal contact and provide orientation and reassurance.  Attempt to identify the patient's wants and feelings, listen to what they are saying, and reflect those wants and feelings back to the patient.  Dont use sarcasm as a weapon    Set clear limits on behavior in a calm voice ("You cannot hit the nurse.")  Offer choices and optimism ("Which toothpaste would you like to use today?")    Redirect the person to an alternative behavior ("Can you come help me with this?)    Avoid saying things like, "We already went over this!" "You can't keep doing this." "I already explained this to you"  Instead, simply nod calmly and acknowledge what the person is saying ("Yes, that makes sense."), and then request their help or company in a different behavior.   Having a mental list of activities the patient enjoys can be helpful with redirection. For example, do they enjoy going for walks? Eating a piece of candy?   If redirection becomes challenging, you can place objects that your family member enjoys strategically in the home in order to use for distraction. This is particularly useful if there are items that they often talk about or frequently ask you questions about; or if they are visually striking. Once you focus the person on this object, talk about it briefly until they are calm and then attempt to redirect to a new behavior.   Sometimes activities which are repetitive can be calming, particularly if there is a comforting sensory element. For example, pt may enjoy folding soft towels or laundry.    Limit overstimulation    Decrease distractions by turning off the TV, computer, any fluorescent lights that hum, etc.  Ask any casual visitors to leave--the fewer people the better  If the person is very agitated, avoid touching them, and respect their personal space  Sit down and ask the person to sit " "down also     PREVENTATIVE STRATEGIES     Try to help the patient develop a routine and stick to it  Address all immediate safety issues  Does the person still have access to the car and keys? Take the keys away, or disconnect the car battery.  Are they wandering at night? Install locks on the outside doors. A keypad lock works well. Alarms on bedroom doors can also be helpful.   Are they turning on the stove and risking a fire? If you cannot limit their access to the kitchen, you may need to unplug dangerous devices any time you are not in the room.   If the person is exhibiting poor judgment throughout the day, they likely need someone supervising them at all times.   Do they still have access to significant financial assets? Limit their access to accounts, and consult with a  if necessary.   Identify situations that seem to trigger agitation or a problematic behavior, and modify the person's environment to minimize or eliminate that trigger.   For example, is their behavior worse if they don't get a good night's sleep? Or if they don't eat or drink enough? If so, prioritize those activities and build behavior plans to support them.  Do they get upset about not being allowed to answer the phone when it rings? Put all of the phones in the house on "silent."   Do they become paranoid that certain family members are trying to take advantage of them? Limit contact with the targeted family member as much as possible, and re-introduce them slowly after some time has passed.   Encourage independence in daily living whenever safely possible  Encourage collaborative decision-making regarding his care as well as daily activities  Encourage regular physical exercise  Address issues that may be impacting sleep   Ex: Urology consult for frequent nighttime urination, address chronic pain that may be interfering with sleep, moving to a different room if his roommate snores loudly, make sure the room is a comfortable " temperature and he has adequate pillows and blankets  Ensure he gets out into the sunlight at least once per day to encourage regular circadian cycle  No caffeine, sugar, or large meals within two hours of bedtime   Make sure room at night is dark and quiet and minimize nighttime interruptions from staff unless medically necessary   Try to help pt go to sleep and wake up at the same time every day  Monitor closely for worsening psychiatric symptoms (insomnia, social withdrawal, deterioration of personal hygiene, hostility, confusing or nonsensical speech, paranoia, hallucinations) and intervene promptly. Assess for possible antecedents, modify environment appropriately.            SLEEP HYGIENE     Poor sleep has a negative effect on cognition. Several strategies have been shown to improve sleep:     Caffeine intake in the afternoon and evening, as well as stuffing oneself at supper, can decrease the quality of restful sleep throughout the night.   Bedtime and wake-up times should be consistent every night and morning so the body becomes used to a single routine, even on the weekends.  Engage in daily physical activity, but not 2-3 hours before bedtime.   No technology use (television, computer, iPad) 1-2 hours before bed.   Have a wind down routine (e.g., soft lights in the house, bath before bed, reduced fluid intake, songs, reading, less noise) to promote sleep readiness.   Visit the www.sleepfoundation.org for more strategies.      COGNITIVE HYGIENE     Engage in regular exercise, which increases alertness and arousal and can improve attention and focus.  Consider lower impact exercises, such as yoga or light walking.  Get a good nights sleep, as this can enhance alertness and cognition.  Eat healthy foods and balanced meals. It is notable that research indicates certain nutrients may aid in brain function, such as B vitamins (especially B6, B12, and folic acid), antioxidants (such as vitamins C and E, and  beta carotene), and Omega-3 fatty acids. Talk with your physician or nutritionist about whats right for you.   Keep your brain active. Find activities to stay mentally active, such as reading, games (cards, checkers), puzzles (crosswords, Sudoku, jig saw), crafts (models, woodworking), gardening, or participating in activities in the community.  Stay socially engaged. Continue staying active with your family and friends.      FUTURE PLANNING     The patient and caregivers should consider formal arrangements to allow a designated person to make medical and financial decisions for the pt, should he/she become unable to do so.  Options to consider include designating a healthcare proxy, medical and/or financial power of , and completing advanced directives for healthcare decisions and estate planning (e.g., finalizing a will).  If cost is prohibitive, [facility] [place] (https://eyeQ.[employer]/) provides free  for individuals with low income.       ADDITIONAL RESOURCES     Alzheimer's Services of the [facility] (www.http://alzbr.org) have good local caregiver and patient resources.   Consider resources for support through the Governors Office of Elderly Affairs (http://goea.[place].[facility]/), [place] Chapter of the Alzheimers Association (www.alz.org/[place]/), the Family Caregiver [place] (www.caregiver.org), and the American Psychological Association (http://www.apa.org/pi/about/publications/caregivers/consumers/index.aspxconsumers/index.aspx).  For More Information About Hallucinations, Delusions, and Paranoia in Alzheimer's [facility] Alzheimer's and related Dementias Education and Referral (ADEAR) Center 1-571.888.7261 (toll-free) johnar@efren.nih.gov www.efren.nih.gov/alzheimers The National [place] on Aging's ADEAR Center offers information and free print publications about Alzheimer's disease and related dementias for families, caregivers, and health professionals. ADEAR Center staff  answer telephone, email, and written requests and make referrals to local and national resources            MEDICAL/SURGICAL COMORBIDITIES     All relevant medical comorbidities noted and managed by primary care physician and medical care team.          MISCELLANEOUS MEDICAL PROBLEMS       HEALTHY LIFESTYLE AND PREVENTATIVE CARE    Encouraged the patient to adhere to the age-appropriate health maintenance guidelines including screening tests and vaccinations.     Discussed the overall importance of healthy lifestyle, optimal weight, exercise, healthy diet, good sleep hygiene and avoiding drugs including smoking, alcohol and recreational drugs. The patient verbalized full understanding.       Advised the patient to follow COVID-19 prevention measures.       I spent 47 minutes total E/M more than 50 % spent face to face with the patient    Time spent in counseling and coordination of care including discussions etiology of diagnosis, pathogenesis of diagnosis, prognosis of diagnosis,, diagnostic results, impression and recommendations, diagnostic studies, management, risks and benefits of treatment, instructions of disease self-management, treatment instructions, follow up requirements, patient and family counseling/involvement in care compliance with treatment regimen. All of the patient's questions were answered during this discussion.     Kala Thorpe, MSN NP      Collaborating Provider: Cali Piedra MD, FAAN Neurologist/Epileptologist

## 2025-01-31 NOTE — TELEPHONE ENCOUNTER
[facility]    Medicine Progress Note - Hospitalist Service    Date of Admission:  10/5/2023    Brief History:  Hunter Gonzalez is a 62 year old male with PMH of HTN, mitral stenosis, CAD, pulmonary HTN, thrombocytopenia, history of DVT, atrial fibrillation, DM II, pancreatic insufficiency, liver cirrhosis, hepatitis C, SCC and IgA nephropathy s/p kidney transplant x 3 (1994, 2001, 2016). Presented to [facility] for MVR bioprosthetic valve, CABG x 1 (LIMA to LAD), left atrial appendage clipping, and cryoablation Lopez/maze procedure on 8/23/23 by Dr. Ibrahim.  He was extubated on 9/7/23 and had delirium and encephalopathy since then.  He became more agitated and had increased secretions with hypoxia, required to be reintubated on 9/12.  He had a trach placed on 9/19 and has not been able to be weaned off vent.  His encephalopathy was worked up and likely multifactorial (metabolic and medication induced)  EEG consistent with moderate diffuse nonspecific encephalopathy.  He has a NG tube and continues to receive tube feeds.   He required 1 unit of blood on 10/1.  He had a fever on 10/4 and ID was re-consulted, recommend to monitor off antibiotics.  He is colonized with pseudomonas and candida.       [facility] course:  10/6-10/8:  He is responding yes and no intermittently to questions.  Hg is low but has been stable at 7.1-7.3.  His penile implant was larger then usual, urology called and explained to nurse how to deflate, now wife says it is about right size. + Mucous diarrhea on 10/8- new, ordered cdiff.    10/9-10/11: Feeding tube came out.  Discussed with speech and they started trial puréed diet with mildly thickened liquids and then video swallow 10/10 which he did somewhat ok on - changed to pureed with thin liquids 1:1 supervision.  Following CXR - has slightly increased opacities but of unclear clinical significance.  Lantus dose decreased when switched from tube feeds to oral diet.  Will need to adjust that based on response.  Refill Routing Note   Medication(s) are not appropriate for processing by [facility] for the following reason(s):        Non-participating provider    [facility] action(s):  Route               Appointments  past 12m or future 3m with PCP    Date Provider   Last Visit   12/2/2024 Nancy Bauman, NP   Next Visit   Visit date not found Nancy Bauman, NP   ED visits in past 90 days: 0        Note composed:8:19 AM 01/31/2025            10/10: still confused but is less so than usual today.  Continue phase 2 wean. Need to watch caloric intake to see if he is able to maintain his nutrition with oral intake, o/w may need GJ tube.    10/12 -10/14:   Patient on pureed with thin liquids 1:1 supervision. Insulin being adjusted as patient on oral diet from tube feeds. IF po intake insufficient, will need to have GJ tube placement; but challenging given need for AC. Encourage PO intake. Start calorie count. 10/14- trial zyprexa at night    Assessment & Plan      Left arm swelling, slight pain x 1 day 10/12. Swelling improved  -Doppler Lt UE PENDING    Acute on chronic respiratory failure s/p trach on 9/19  Atelectasis   dysplastic cells from 9/18  -pulm consult   -RT consult  -vent wean per pulm  -trach dome during day, vent at night  -mucomyst and duonebs   -low threshold for Abx given recent CXR findings, may need further imaging if worsens.    New mucous diarrhea  -C. Diff neg  -unchanged    Encephalopathy   Anxiety   Agitation   Delerium  -Duloxetine   -10/14 : trial zyprexa at night  -Requiring restraints    S/p CABG x1 (LIMA to LAD) on 8/23  S/p L atrial appendage clipping with CHAVEZ/MAZE procedure on 8/23  S/p MVR -bioprosthetic valve on 8/23  Aflutter on 9/10  Wide complex tachyarrhythmia on 8/26  Hx of Atial fibrillation   hx of CAD, mitral valve stenosis  -sternal precautions until 10/4  -aspirin 162 mg daily   -hold statin due to liver cirrhosis   -hold BB/ACEI due to low BP  -warfarin dosing per pharm  -Cardiology on call paged 10/13 : regarding AC , awaiting call back.    S/p renal transplant x 3 (1994, 2001, 2016)  IgA nephropathy   Uremia   SAVANNAH on CKD, now requiring iHD  -renal consult   -HD per renal   -cont MMF  -cont tacro - pharm dosing   -cont prednisone   -cont bactrim for PJP prophy    Hepatic cirrhosis   Hx of hepatitis C s/p treatment   Transaminitis and hyperbilirubinemia   GERD   Pancreatic insufficiency  -monior LFT's  -fiber for  loose stools  -pantoprazole     Dysphagia  -nutrition consulted  -passed video swallow 10/10, placed on modified diet pureed with thin liquids.  -tube feeds stopped as he pulled out his NJ, hopefully can avoid replacing but if he does not maintain nutrition then may need GJ tube.  - IR consulted 10/13 for GJ tube possibly week of 10/16. Need coumadin held. Scheduling pending.   -Calorie count initiated 10/13    DM type 2, insulin dependent, hyperglycemia   -had been on lantus 30 units BID with tube feeds, now he is on oral diet have started lantus 8 units daily and change to QID ac/hs accuchecks/sliding scale insulin       +CMV  +EBV   ?Staph epi bacteremia   S/p pseudomonas pneumonia   Colonized with PsA and Candida  -Per ID notes at [facility] :    - meropenem: He has received a full one week (9/18 - 24/23) course for the possibility of a Pseudomonas healthcare-associated pneumonia. (Going forward, he is likely to continue to isolate Pseudomonas in sputum cultures as a persistent colonizer as long as his tracheostomy remains in place.)   - micafungin -- he has received a one week (9/19 - 25/23) course for persistent C albicans in respiratory cultures. The Candida is likely a now-persistent and non-pathogenic upper respiratory colonizer   - off IV vancomycin:  Only one of six 9/18 - 23/23 blood cultures isolated Staph epidermidis.   Plan has been to monitor for a residual Staph epidermidis line-contamination / bacteremia with surveillance blood cultures at about seven (~ 10/2/23) and two (~ 10/9/23) weeks off antibiotics. If recurrent fever during this monitoring window, I would recommend repeat blood culture at that time, and hold empirical antibiotics unless a new positive isolate is cultured, or there is a clear clinical change suggesting an infectious process. If concern for recurrent Staph epidermidis bacteremia, I would favor empirical treatment with vancomycin.    -Sputum culture is again positive on 9/29 for  Pseudomonas and Candida. He is likely to continue to isolate Pseudomonas (and Candida) in respiratory cultures as a persistent colonizer as long as his tracheostomy remains in place, so would not treat the Pseudomonas again in the future without additional clearcut findings suggesting a new pneumonia, such as new pulmonary infiltrate or worsening respiratory status. If this occurs, cefepime would be a good choice for empirical coverage. Candida is essentially never a pneumonic pathogen.   - finished two week course of IV ganciclovir on 10/3/23 (for the very-low-grade 9/19/23 CMV viremia and the viral cytopathic effect seen in his 9/19/23 sputum cytopathology)  - Check weekly plasma CMV PCr viral load assays for three weeks after the ganciclovir is discontinued. stable so far  - Monitor the blood EBV PCR viral load assay about monthly x 3. Rising exponentially.   10/12: transplant team recommended decreasing domingo of mycophenolate from 500mg bid to 250mg bid given his increase in EBV load.  - Continue TMP-SMX for Pneumocystis prophylaxis.     Chronic thrombocytopenia   Anemia of chronic disease  Anemia of acute blood loss  Hx of DVT, provoked   Coagulopathy due to surgical blood loss  -warfarin dosing per pharm  -s/p 1 unit of PRBC on 10/1  -montior cbc    L 5th toe dry necrosis   -wound care     Penile implant  - larger than normal -consulted urology  - implant was reduced per urology instructions      Resolved :  Adrenal insufficiency  LLE cellulitis  Pseudomonas pneumonia        Diet: Combination Diet Regular Diet; Moderate Consistent Carb (60 g CHO per Meal) Diet; Pureed Diet (level 4); Thin Liquids (level 0)  Calorie Counts  Snacks/Supplements Adult: Nepro Oral Supplement; With Meals  Snacks/Supplements Adult: Magic Cup; With Meals  Calorie Counts    DVT Prophylaxis: Warfarin  [place] Catheter: Not present  Lines: PRESENT      PICC 09/06/23 Triple Lumen Right Brachial vein medial ACCESS. PICC okay to use.-Site  Assessment: WDL  Hemodialysis Vascular Access Arteriovenous fistula Left Arm-Site Assessment: WDL      Cardiac Monitoring: None  Code Status: No CPR- Pre-arrest intubation OK      Clinically Significant Risk Factors          # Hypocalcemia: Lowest iCa = 1.14 mg/dL in last 2 days, will monitor and replace as appropriate  # Hypercalcemia: corrected calcium is >10.1, will monitor as appropriate    # Hypoalbuminemia: Lowest albumin = 2.4 g/dL at 10/9/2023  6:15 AM, will monitor as appropriate           # Hypertension: Noted on problem list    # Chronic heart failure with preserved ejection fraction: heart failure noted on problem list and last echo with EF >50%      # DMII: A1C = 8.2 % (Ref range: <5.7 %) within past 6 months        # History of CABG: noted on surgical history       Disposition Plan     Expected Discharge Date: 10/23/2023    Discharge Delays: Complex Discharge  Destination: other (comment) (to be determined)  Discharge Comments: trach, NG tube, likely needs PEG tube          Jos Sheldon MD  Hospitalist Service  [facility]  Securely message with [employer] (more info)  Text page via [email or web address] Paging/Directory   ______________________________________________________________________    Interval History   No acute events overnight.     Patient did not sleep last night, restless. No affect with atarax. Confused. Unable to be redirected. Dangling leg over the bed side rail. Denied discomfort. Required restraints.     Denies sob or chest pain.     Left arm/ hand slightly swollen (improved), US PENDING    Patient with sister and nephew, in good spirits. Encouraging po intake.        Last 24H PRN:     hydrOXYzine (ATARAX) tablet 25 mg, 25 mg at 10/13/23 6528 **OR** hydrOXYzine (ATARAX) tablet 50 mg, 50 mg at 10/13/23 0825      Physical Exam   Vital Signs: Temp: 98  F (36.7  C) Temp src: Oral BP: 122/66 Pulse: 106   Resp: 20 SpO2: 94 % O2 Device: Nasal cannula with humidification Oxygen Delivery: 2 LPM  Weight: 200 lbs  13.42 oz    General:  No acute distress, trach on trach dome   Eyes:  Sclera non icteric, normal conjuctiva  Nose:  NG tube in place   Neck :  Supple, no lymphadenopathy, trach in place   Lungs:  Clear to auscultation bilaterally, air entry equal bilaterally, no rhonchi or rales  CVS:  S1 and S2, regular rate and rhythem  Abdomen:  Soft, nontender  :  penile implant  Musculoskeletal: left arm swelling/ tenderness + - improved since time of onset  Neuro:  awake, toriented to year and month, not place or day, speaking in partial sentences moving all extremities     Medical Decision Making       55 MINUTES SPENT BY ME on the date of service doing chart review, history, exam, documentation & further activities per the note.  Discussed with nursing.    Data   Imaging results reviewed over the past 24 hrs:   No results found for this or any previous visit (from the past 24 hour(s)).        Most Recent 3 CBC's:  Recent Labs   Lab Test 10/13/23  0616 10/12/23  1106 10/11/23  0528 10/10/23  0602 10/09/23  0615   WBC 4.2  --  5.5  --  7.2   HGB 7.2* 7.0* 7.1*   < > 7.3*   MCV 94  --  93  --  92     --  146*  --  130*    < > = values in this interval not displayed.     Most Recent 3 BMP's:  Recent Labs   Lab Test 10/13/23  2042 10/13/23  1748 10/13/23  1207 10/13/23  0809 10/13/23  0616 10/12/23  1217 10/12/23  1106 10/11/23  0753 10/11/23  0528 10/09/23  0627 10/09/23  0615   NA  --   --   --   --  136  --  137  --  138   < > 137   POTASSIUM  --   --   --   --  4.0  --  4.0  --  3.9   < > 3.7   CHLORIDE  --   --   --   --  99  --   --   --  101  --  99   CO2  --   --   --   --  28  --   --   --  28  --  29   BUN  --   --   --   --  21.4  --   --   --  33.4*  --  71.6*   CR  --   --   --   --  1.49*  --   --   --  1.34*  --  1.70*   ANIONGAP  --   --   --   --  9  --   --   --  9  --  9   PACHECO  --   --   --   --  9.2  --   --   --  8.4*  --  10.2   * 257* 219*   < > 135*   < > 196*   < > 139*   < > 49*    < > =  values in this interval not displayed.     Most Recent 2 LFT's:  Recent Labs   Lab Test 10/13/23  0616 10/11/23  0528   AST 63* 75*   ALT 40 51   ALKPHOS 457* 520*   BILITOTAL 1.5* 1.7*     Most Recent 3 INR's:  Recent Labs   Lab Test 10/13/23  0616 10/12/23  0521 10/11/23  0528   INR 4.26* 3.35* 2.73*

## 2025-02-03 DIAGNOSIS — F02.811 MAJOR NEUROCOGNITIVE DISORDER DUE TO ANOTHER MEDICAL CONDITION, WITH AGITATION: ICD-10-CM

## 2025-02-03 DIAGNOSIS — R41.3 MEMORY LOSS: ICD-10-CM

## 2025-02-03 DIAGNOSIS — F41.9 ANXIETY: ICD-10-CM

## 2025-02-03 DIAGNOSIS — G31.84 MCI (MILD COGNITIVE IMPAIRMENT) WITH MEMORY LOSS: ICD-10-CM

## 2025-02-03 LAB
IMMUNOLOGIST REVIEW: NORMAL
M PHOSPHO-TAU 217: 0.12 PG/ML

## 2025-02-04 LAB — LC PHOSPHO-TAU (181P): 0.98 PG/ML (ref 0–0.97)

## 2025-02-05 RX ORDER — BUSPIRONE HYDROCHLORIDE 10 MG/1
10 TABLET ORAL 2 TIMES DAILY
Qty: 60 TABLET | Refills: 11 | Status: SHIPPED | OUTPATIENT
Start: 2025-02-05 | End: 2026-02-05

## 2025-02-07 ENCOUNTER — PATIENT OUTREACH (OUTPATIENT)
Dept: ADMINISTRATIVE | Facility: OTHER | Age: 75
End: 2025-02-07
Payer: MEDICARE

## 2025-02-07 NOTE — PROGRESS NOTES
02-    P- 0.98 ^ , P- 0.117 NL    Lab WNL except elevated [id number] this is a Alzheimer disease marker, it is an indicator for active disease. Recommend continuing memory work up and annual cognitive assessment.   Continue memantine/Namenda 10 mg BID and continue donepezil/Aricept 10 mg QHS     
No

## 2025-02-13 ENCOUNTER — PATIENT MESSAGE (OUTPATIENT)
Dept: NEUROLOGY | Facility: CLINIC | Age: 75
End: 2025-02-13
Payer: MEDICARE

## 2025-02-14 ENCOUNTER — PATIENT OUTREACH (OUTPATIENT)
Dept: ADMINISTRATIVE | Facility: OTHER | Age: 75
End: 2025-02-14
Payer: MEDICARE

## 2025-02-14 NOTE — PROGRESS NOTES
CHW - Unable to Contact    LPN to close episode at this time due to four missed attempts for patient contact.

## 2025-02-18 ENCOUNTER — PATIENT MESSAGE (OUTPATIENT)
Dept: INTERNAL MEDICINE | Facility: CLINIC | Age: 75
End: 2025-02-18
Payer: MEDICARE

## 2025-02-21 ENCOUNTER — CLINICAL SUPPORT (OUTPATIENT)
Dept: REHABILITATION | Facility: HOSPITAL | Age: 75
End: 2025-02-21
Payer: MEDICARE

## 2025-02-21 DIAGNOSIS — R41.841 COGNITIVE COMMUNICATION DISORDER: ICD-10-CM

## 2025-02-21 DIAGNOSIS — E66.811 CLASS 1 OBESITY DUE TO EXCESS CALORIES WITH SERIOUS COMORBIDITY AND BODY MASS INDEX (BMI) OF 30.0 TO 30.9 IN ADULT: ICD-10-CM

## 2025-02-21 DIAGNOSIS — R29.898 DECREASED STRENGTH OF LOWER EXTREMITY: ICD-10-CM

## 2025-02-21 DIAGNOSIS — E66.09 CLASS 1 OBESITY DUE TO EXCESS CALORIES WITH SERIOUS COMORBIDITY AND BODY MASS INDEX (BMI) OF 30.0 TO 30.9 IN ADULT: ICD-10-CM

## 2025-02-21 DIAGNOSIS — G31.84 MCI (MILD COGNITIVE IMPAIRMENT) WITH MEMORY LOSS: Primary | ICD-10-CM

## 2025-02-21 DIAGNOSIS — I63.81 LACUNAR INFARCTION: ICD-10-CM

## 2025-02-21 PROCEDURE — 97530 THERAPEUTIC ACTIVITIES: CPT

## 2025-02-21 PROCEDURE — 97110 THERAPEUTIC EXERCISES: CPT

## 2025-02-21 PROCEDURE — 97162 PT EVAL MOD COMPLEX 30 MIN: CPT

## 2025-02-21 NOTE — PROGRESS NOTES
"[facility]  Physical Therapy Neurological Rehabilitation Initial Evaluation     Name: Rosa Isela Pate  Clinic Number: [id number]    Therapy Diagnosis:   Encounter Diagnoses   Name Primary?    Class 1 obesity due to excess calories with serious comorbidity and body mass index (BMI) of 30.0 to 30.9 in adult     Decreased strength of lower extremity      Physician: Lacey Thorpe NP    Physician Orders: PT Eval and Treat   Medical Diagnosis from Referral: Class 1 obesity due to excess calories with serious comorbidity and body mass index (BMI) of 30.0 to 30.9 in adult [E66.811, E66.09, Z68.30], Decreased strength of lower extremity [R29.898]   Evaluation Date: 2/21/2025  Authorization Period Expiration: 1/31/2026  Plan of Care Expiration: 4/21/2025  Progress Note Due: 3/21/2025  Date of Surgery: n/a  Visit # / Visits authorized: 1/1  FOTO: 1/ 3    Precautions: Standard and Fall    Time In: 1115  Time Out: 1200  Total Billable Time: 45 minutes    Subjective      Date of onset: at least a month (possibly a year)    History of current condition - Rosa Isela reports: that she has been to therapy before for "the same thing". Pt has not attended therapy in 1.5 years, per self and daughter. Per daughter, pt is dragging her feet when she walks around the house and stumbling more often that she use to. Pt's daughter is concerned about pt's balance. Pt has not had speech therapy before. Pt's daughter moved in with parents a month ago because they are having trouble with "everything". Pt reports that she is able to dress and bathe. Pt's daughter states that pt should not drive but pt does feel like she can. Pt's daughter states that pt feel in bathroom 1 month ago due to tripping over the rug. Pt attributes this to medicine that makes her lightheaded and dizzy. Pt feels that she takes too much medicine. Pt's daughter states that mother no longer cooks, but did not really like cooking prior to onset of " "MCI. Pt makes grits in AM but that is it. Pt still able to clean.     Prior Therapy: yes, 1.5 years ago  Social History:  lives with their family  Falls: 1 in last month   DME: none   Home Environment: no CURRY; 1 story   Exercise Routine / History: per family and pt, she walks inside and outside   Family Present at time of Eval: yes, daughter   Occupation: retired, shopping online  Prior Level of Function: independent   Current Level of Function: independent     Pain: "just my back but I think it's my bed"    Patient's goals: "improve her memory and get a good system going"     Medical History:   Past Medical History:   Diagnosis Date    Anxiety 1/31/2025    Chronic kidney disease, stage 3a 04/20/2023    Depression     Hyperlipidemia     Hypertension     Obesity     Stroke     Tobacco dependence        Surgical History:   Rosa Isela Pate  has a past surgical history that includes Hysterectomy; Tubal ligation; Myomectomy; Cholecystectomy; Colonoscopy (N/A, 11/1/2017); Oophorectomy; and Colonoscopy (N/A, 6/26/2023).    Medications:   Rosa Isela has a current medication list which includes the following prescription(s): albuterol, amlodipine, aspirin, atorvastatin, buspirone, donepezil, irbesartan-hydrochlorothiazide, lamotrigine, memantine, montelukast, multivitamin with minerals, and trazodone.    Allergies:   Review of patient's allergies indicates:   Allergen Reactions    Enalapril Other (See Comments)     Ace cough. She doesn't recall this medication or a history of medication allergies/adverse reactions.        Objective      BALANCE:   Evaluation   30 second Chair Rise 11 completed with arms     Postural control:  MCTSIB:  1. Eyes Open/feet together/Firm: 30 seconds  2. Eyes Closed/feet together/Firm: 30 seconds, some increased dizziness and sway  3. Eyes Open/feet together/Foam: 30 seconds, moderately increased sway  4. Eyes Closed/feet together/Foam: 30 seconds, "I'm going to fall" increased sway    Endurance " "Deficit: fair (pt felt "tired" after MCTSIB)      Evaluation   Timed Up and Go 11.05 sec       Cognition: deferred to speech therapy  Standing balance: good -  MMT: WFL  ROM: WFL    Functional Gait Assessment:   1. Gait on level surface =  3   (3) Normal: less than 5.5 sec, no A.D., no imbalance, normal gait pattern, deviates< 6in   (2) Mild impairment: 7-5.6 sec, uses A.D., mild gait deviations, or deviates 6-10 in   (1) Moderate impairment: > 7 sec, slow speed, imbalance, deviates 10-15 in.   (0) Severe impairment: needs assist, deviates >15 in, reach/touch wall  2. Change in Gait Speed = 3   (3) Normal: smooth change w/o loss of balance or gait deviation, deviates < 6 in, significant difference between speeds   (2) Mild impairment: changes speed, but demonstrates mild gait deviations, deviates 6-10 in, OR no deviations but unable to significantly speed, OR uses A.D.   (1) Moderate impairment: minor changes to speed, OR changes speed w/ significant deviations, deviates 10-15 in, OR  Changes speed , but loses balance & recovers   (0) Severe impairment: cannot change speed, deviates >15 in, or loses balance & needs assist  3. Gait with horizontal head turns  = 3   (3) Normal: no change in gait, deviates <6 in   (2) Mild impairment: slight change in speed, deviates 6-10 in, OR uses A.D.   (1) Moderate impairment: moderate change in speed, deviates 10-15 in   (0) Severe impairment: severe disruption of gait, deviates >15in  4. Gait with vertical head turns = 3   (3) Normal: no change in gait, deviates <6 in   (2) Mild impairment: slight change in speed, deviates 6-10 in OR uses A.D.   (1) Moderate impairment: moderate change in speed, deviates 10-15 in   (0) Severe impairment: severe disruption of gait, deviates >15 in  5. Gait with pivot turns = 2   (3) Normal: performs safely in 3 sec, no LOB   (2) Mild impairment: performs in >3 sec & no LOB, OR turns safely & requires several steps to regain LOB   (1) Moderate " impairment: turns slow, OR requires several small steps for balance following turn & stop   (0) Severe impairment: cannot turn safely, needs assist  6. Step over obstacle = 1   (3) Normal: steps over 2 stacked boxes w/o change in speed or LOB   (2) Mild impairment: able to step over 1 box w/o change in speed or LOB   (1) Moderate impairment: steps over 1 box but must slow down, may require VC   (0) Severe impairment: cannot perform w/o assist  7. Gait with Narrow NADIRA = 2   (3) Normal: 10 steps no staggering   (2) Mild impairment: 7-9 steps   (1) Moderate impairment: 4-7 steps   (0) Severe impairment: < 4 steps or cannot perform w/o assist  8. Gait with eyes closed = 1   (3) Normal: < 7 sec, no A.D., no LOB, normal gait pattern, deviates <6 in   (2) Mild impairment: 7.1-9 sec, mild gait deviations, deviates 6-10 in   (1) Moderate impairment: > 9 sec, abnormal pattern, LOB, deviates 10-15 in   (0) Severe impairment: cannot perform w/o assist, LOB, deviates >15in  9. Ambulating Backwards = 2   (3) Normal: no A.D., no LOB, normal gait pattern, deviates <6in   (2) Mild impairment: uses A.D., slower speed, mild gait deviations, deviates 6-10 in   (1) Moderate impairment: slow speed, abnormal gait pattern, LOB, deviates 10-15 in   (0) Severe impairment: severe gait deviations or LOB, deviates >15in  10. Steps = 2   (3) Normal: alternating feet, no rail   (2) Mild Impairment: alternating feet, uses rail   (1) Moderate impairment: step-to, uses rail   (0) Severe impairment: cannot perform safely    Score 22/30     Score:   <22/30 fall risk   <20/30 fall risk in older adults   <18/30 fall risk in Parkinsons       Intake Outcome Measure for FOTO  Survey  Neuromuscular disorder    Therapist reviewed FOTO scores for Rosa Isela JASON Herlinda on 2/21/2025.   FOTO report - see Media section or FOTO account episode details.    Intake Score: 48%       Treatment     Total Treatment time separate from Evaluation: 15 minutes    Rosa Isela  received the treatments listed below:      therapeutic exercises to develop strength, endurance, ROM, flexibility, posture, and core stabilization for 10 minutes including:  Sit to stands  Standing hip abd  Standing marches        Patient Education and Home Exercises     Education provided:   - HEP/POC    Written Home Exercises Provided: yes.  Exercises were reviewed and Rosa Isela was able to demonstrate them prior to the end of the session.  Rosa Isela demonstrated good  understanding of the education provided.     See EMR under Patient Instructions for exercises provided 2/21/2025.    Assessment     Rosa Isela is a 74 y.o. female referred to outpatient Physical Therapy with a medical diagnosis of mild cognitive impairment (MCI). Patient presents with decreased strength, impaired functional dynamic balance and decreased endurance    Patient prognosis is Fair.   Patient will benefit from skilled outpatient Physical Therapy to address the deficits stated above and in the chart below, provide patient /family education, and to maximize patient's level of independence.     Plan of care discussed with patient: Yes  Patient's spiritual, cultural and educational needs considered and patient is agreeable to the plan of care and goals as stated below:     Anticipated Barriers for therapy: noncompliance, nonattendance     Medical Necessity is demonstrated by the following  History  Co-morbidities and personal factors that may impact the plan of care [] LOW: no personal factors / co-morbidities  [] MODERATE: 1-2 personal factors / co-morbidities  [x] HIGH: 3+ personal factors / co-morbidities    Moderate / High Support Documentation:   Co-morbidities affecting plan of care:   Past Medical History:   Diagnosis Date    Anxiety 1/31/2025    Chronic kidney disease, stage 3a 04/20/2023    Depression     Hyperlipidemia     Hypertension     Obesity     Stroke     Tobacco dependence          Personal Factors:   age     Examination  Body  Structures and Functions, activity limitations and participation restrictions that may impact the plan of care [] LOW: addressing 1-2 elements  [x] MODERATE: 3+ elements  [] HIGH: 4+ elements (please support below)    Moderate / High Support Documentation: decreased strength, decreased endurance, impaired dynamic balance     Clinical Presentation [] LOW: stable  [x] MODERATE: Evolving  [] HIGH: Unstable     Decision Making/ Complexity Score: moderate       Goals:  Short Term Goals: 4 weeks   Pt will be able to perform 12 reps of sit to stands during 30 sec sit to stand test in order to decrease fall risk and improve functional mobility/endurance  Pt will be able to achieve 24/30 on FGA in order to reduce fall risk and improve functional mobility   Pt will report no falls in the last 4 weeks in order to reduce risk of injury and improve functional independence   Pt will    Long Term Goals: 8 weeks   Pt will be able to perform 13 reps of sit to stands during 30 sec sit to stand test in order to decrease fall risk and improve functional mobility/endurance  Pt will be able to achieve 26/30 on FGA in order to reduce fall risk and improve functional mobility   Pt will report that she feels like she   Pt will  Plan     Plan of care Certification: 2/21/2025 to 4/21/2025.    Outpatient Physical Therapy 1 times weekly for 8 weeks to include the following interventions: Gait Training, Neuromuscular Re-ed, Patient Education, Therapeutic Activities, and Therapeutic Exercise.     Lyly Mclaughlin, PT        Physician's Signature: _________________________________________ Date: ________________

## 2025-03-04 ENCOUNTER — TELEPHONE (OUTPATIENT)
Dept: NEUROLOGY | Facility: CLINIC | Age: 75
End: 2025-03-04
Payer: MEDICARE

## 2025-03-04 ENCOUNTER — PATIENT MESSAGE (OUTPATIENT)
Dept: INTERNAL MEDICINE | Facility: CLINIC | Age: 75
End: 2025-03-04
Payer: MEDICARE

## 2025-03-04 NOTE — TELEPHONE ENCOUNTER
----- Message from Lata sent at 3/4/2025 10:33 AM CST -----  Contact: lacy Thorpe  ..Type:  Patient Requesting CallWho Called: lacy Thorpe Does the patient know what this is regarding?: needing clarification for donepeziL (ARICEPT) 10 MG tabletWould the patient rather a call back or a response via [place]?  callBest Call Back Number: [phone] Information:  ref #[id number]

## 2025-03-14 ENCOUNTER — CLINICAL SUPPORT (OUTPATIENT)
Dept: REHABILITATION | Facility: HOSPITAL | Age: 75
End: 2025-03-14
Payer: MEDICARE

## 2025-03-14 DIAGNOSIS — R68.89 DECREASED ACTIVITY TOLERANCE: Primary | ICD-10-CM

## 2025-03-14 DIAGNOSIS — I63.81 LACUNAR INFARCTION: ICD-10-CM

## 2025-03-14 DIAGNOSIS — G31.84 MCI (MILD COGNITIVE IMPAIRMENT) WITH MEMORY LOSS: ICD-10-CM

## 2025-03-14 DIAGNOSIS — R41.841 COGNITIVE COMMUNICATION DISORDER: ICD-10-CM

## 2025-03-14 PROCEDURE — 97110 THERAPEUTIC EXERCISES: CPT

## 2025-03-14 PROCEDURE — 96125 COGNITIVE TEST BY HC PRO: CPT

## 2025-03-14 NOTE — PROGRESS NOTES
[facility]   Physical Therapy Treatment Note      Name: Rosa Isela Pate  Clinic Number: [id number]    Therapy Diagnosis:   Encounter Diagnosis   Name Primary?    Decreased activity tolerance Yes     Physician: Lacey Thorpe NP    Visit Date: 3/14/2025    Physician Orders: PT Eval and Treat   Medical Diagnosis from Referral: Class 1 obesity due to excess calories with serious comorbidity and body mass index (BMI) of 30.0 to 30.9 in adult [E66.811, E66.09, Z68.30], Decreased strength of lower extremity [R29.898]   Evaluation Date: 2/21/2025  Authorization Period Expiration: 1/31/2026  Plan of Care Expiration: 4/21/2025  Progress Note Due: 3/21/2025  Date of Surgery: n/a  Visit # / Visits authorized: 1/10  FOTO: 1/ 3     Precautions: Standard and Fall     Time In: 1312 (pt late)  Time Out: 1320   Total Billable Time: 8 minutes    PTA Visit #: 0/5       Subjective     Patient reports: that she has been doing well, has had no falls and has speech therapy appt on 4/21.  She was compliant with home exercise program.      Objective      Objective Measures updated at progress report unless specified.     Treatment     Rosa Isela received the treatments listed below:      therapeutic exercises to develop strength, endurance, ROM, flexibility, posture, and core stabilization for 8 minutes including:  Nustep 5 min, L5    neuromuscular re-education activities to improve: Balance, Coordination, Kinesthetic, Sense, Proprioception, and Posture for 0 minutes. The following activities were included:      therapeutic activities to improve functional performance for 0  minutes, including:      gait training to improve functional mobility and safety for -  minutes, including:        Patient Education and Home Exercises       Education provided:   - HEP/POC    Written Home Exercises Provided: Pt instructed to continue prior HEP. Exercises were reviewed and Rosa Isela was able to demonstrate them prior to the  end of the session.  Rosa Isela demonstrated good  understanding of the education provided. See Electronic Medical Record under Patient Instructions for exercises provided during therapy sessions    Assessment     Pt felt that wili was tiring but did well. PT coordinated with SLP for pt to see SLP today as pt's cognition is more of an issue than her physical limitations at this time. Pt's ambulation appeared good today.     Rosa Isela Is progressing well towards her goals.   Patient prognosis is Good.     Patient will continue to benefit from skilled outpatient physical therapy to address the deficits listed in the problem list box on initial evaluation, provide pt/family education and to maximize pt's level of independence in the home and community environment.     Patient's spiritual, cultural and educational needs considered and pt agreeable to plan of care and goals.     Anticipated barriers to physical therapy: noncompliance, nonattendance     Goals:     Short Term Goals: 4 weeks   Pt will be able to perform 12 reps of sit to stands during 30 sec sit to stand test in order to decrease fall risk and improve functional mobility/endurance  Pt will be able to achieve 24/30 on FGA in order to reduce fall risk and improve functional mobility   Pt will report no falls in the last 4 weeks in order to reduce risk of injury and improve functional independence      Long Term Goals: 8 weeks   Pt will be able to perform 13 reps of sit to stands during 30 sec sit to stand test in order to decrease fall risk and improve functional mobility/endurance  Pt will be able to achieve 26/30 on FGA in order to reduce fall risk and improve functional mobility     Plan     Continue with POC, advancing as pt can tolerate    Lyly Mclaughlin, PT

## 2025-03-16 ENCOUNTER — PATIENT MESSAGE (OUTPATIENT)
Dept: NEUROLOGY | Facility: CLINIC | Age: 75
End: 2025-03-16
Payer: MEDICARE

## 2025-03-17 DIAGNOSIS — R41.841 COGNITIVE COMMUNICATION DISORDER: ICD-10-CM

## 2025-03-17 DIAGNOSIS — I63.81 LACUNAR INFARCTION: ICD-10-CM

## 2025-03-17 DIAGNOSIS — G31.84 MCI (MILD COGNITIVE IMPAIRMENT) WITH MEMORY LOSS: Primary | ICD-10-CM

## 2025-03-17 NOTE — PROGRESS NOTES
[facility]  Speech Therapy Evaluation -Neurological Rehabilitation    Date: 3/14/2025     Name: Rosa Isela Pate   MRN: [id number]    Therapy Diagnosis:   Encounter Diagnoses   Name Primary?    MCI (mild cognitive impairment) with memory loss     Lacunar infarction     Cognitive communication disorder      Physician: Lacey Thorpe NP  Physician Orders: Ambulatory Referral to Speech Therapy   Medical Diagnosis:   G31.84 (ICD-10-CM) - MCI (mild cognitive impairment) with memory loss   I63.81 (ICD-10-CM) - Lacunar infarction   R41.841 (ICD-10-CM) - Cognitive communication disorder       Visit # / Visits Authorized:  1 / 1   Date of Evaluation:  3/14/2025   Insurance Authorization Period: 2/21/2025 to 12/31/2025  Plan of Care Certification:    3/14/2025 to 5/9/2025      Time In: 1320   Time Out: 1345   Total time: 25 minutes     Procedure   Cognitive Communication Evaluation including scoring and interpretation (Total time: 60 minutes)     Precautions: Standard and Fall  Subjective   Date of Onset: 2020  History of Current Condition:  Rosa Isela Pate is a 74 y.o. female who presents to [facility] Outpatient Speech Therapy for evaluation secondary to mild cognitive impairment. Patient was referred to therapy by Lacey Thorpe NP , which is the patient's Neurology provider.   Patient was accompanied to the evaluation by her daughter, who provided history. Per her daughter, patient experienced cognitive changes following a cerebral vascular accident in 2020. She is followed by Neurology. She has completed speech therapy in the past (in [place] and again at [facility] in [place]). They report attention and short term memory concerns.   Past Medical History: Rosa Isela Pate  has a past medical history of Anxiety (1/31/2025), Chronic kidney disease, stage 3a (04/20/2023), Depression, Hyperlipidemia, Hypertension, Obesity, Stroke, and Tobacco dependence.  Rosa Isela Pate  has a  past surgical history that includes Hysterectomy; Tubal ligation; Myomectomy; Cholecystectomy; Colonoscopy (N/A, 11/1/2017); Oophorectomy; and Colonoscopy (N/A, 6/26/2023).  Medical Hx and Allergies: Rosa Isela has a current medication list which includes the following prescription(s): albuterol, amlodipine, aspirin, atorvastatin, buspirone, donepezil, irbesartan-hydrochlorothiazide, lamotrigine, memantine, montelukast, multivitamin with minerals, and trazodone.   Review of patient's allergies indicates:   Allergen Reactions    Enalapril Other (See Comments)     Ace cough. She doesn't recall this medication or a history of medication allergies/adverse reactions.     Prior Therapy:  Patient has completed prior outpatient ST   Social History:  Patient lives with her daughter. Patient is not currently driving.   Occupation:  N/A  Prior Level of Function: Independent    Current Level of Function: Requires some assistance for activities of daily living and/or complex tasks (I.e. medication or financial management).   Pain Scale: no pain indicated throughout session  Patient's Therapy Goals:  To assess cognitive-communication functions   Objective   Formal Assessment:  The Repeatable Battery for the Assessment of Neuropsychological Status (RBANS) Version A was administered. The RBANS is a brief, individually administered battery to measure cognitive decline or improvement. It covers five domains including immediate memory, visuospatial/constructional, language, attention, and delayed memory. The results are outlined below:    Domain Subtest Total Score Index Score Percentile Qualitative Description   Immediate Memory List Learning 20   76   5%   Borderline    Story Memory 10      Visuospatial/  Constructional Figure Copy 19   92   30%   Average    Line Orientation 12      Language Picture Naming 10   96   39%   Average    Semantic Fluency 17      Attention Digit Span 7   88   21%   Low Average    Coding 44        Delayed  Memory List Recall 0     56     .2%     Extremely Low    List Recognition 14       Story Recall 0       Figure Recall 9          Total Scale   77     Percentile   6%     Descriptor   Borderline   Interpretation:  Scaled score: mean of 10, standard deviation of 3. Therefore, 16+ = Very Superior; 14-15 = Superior; 12-13 = High Average; 8-11 = Average; 6-7 = Low Average; 4-5 = Borderline; 3 and below = Extremely Low    Index score: mean of 100, standard deviation of 15. Therefore, 130+ = Very Superior; 120-129 = Superior; 110-119 = High average;  = Average; 80-89 = Low Average; 70-79 = Borderline; 69 and below = Extremely Low. Apparently the most reliable score; factor in education level.    Treatment   Total Treatment Time Separate from Evaluation: not applicable   No treatment performed secondary to time to complete evaluation.     Education provided:   -role of Speech Therapy, goals/plan of care, scheduling/cancellations, insurance limitations with patient    Patient and family members expressed understanding.     Home Program: Not yet established   Assessment     Rosa Isela presents to [facility] status post medical diagnosis of MCI (mild cognitive impairment) with memory loss.     Interpretation of objective assessment:     Overall, according to the RBANS research, total Scale index is a good indicator of general cognitive functioning. The patient presents with a mild-moderate cognitive communication disorder charaterized by deficits in attention which impacts her ability to encode, store, and subsequently retreive information at immediate and delayed intervals. Of note, visual attention and memory appears to be less impacted than auditory as indicated by the coding and figure recall tasks. However, information presented verbally was not recalled at a delayed interval (list recall/story recall) . Patient demonstrated relative strengths in visual attention (coding), language tasks, and  visuospatial/constructional tasks.     Demonstrates impairments including limitations as described in the problem list.     Positive prognostic factors: Family support  Negative prognostic factors: Potential degenerative disease per chart review   Barriers to therapy: No barriers to therapy identified.     Patient's spiritual, cultural, and educational needs considered and patient agreeable to plan of care and goals.    Patient will benefit from skilled therapy.    Rehab Potential: good      Short Term Goals: (6 weeks) Current Progress:   1. Patient will sustain attention to complete moderate to complex reasoning tasks for 2 minutes with one request for clarification to increase sustained attention.    Progressing/ Not Met 3/14/2025   Established this date   2. Patient will complete selective attention tasks (auditory or visual) with 90% accuracy independently increase selective attention.     Progressing/ Not Met 3/14/2025   Established this date   3. Patient will use attention shifting strategies to shift attention between two tasks (auditory or visual) with no more than 3 cues or 90% accuracy to improve alternating attention.      Progressing/ Not Met 3/14/2025   Established this date    4. Patient will complete short term recall tasks after a 5 minutes delay with 90% accuracy  independently  with use of memory strategies to improve recall of information and generalization of memory strategies.     Progressing/ Not Met 3/14/2025   Established this date    5. Patient will complete mental manipulation tasks with 90% acc to improve working memory.     Progressing/ Not Met 3/14/2025   Established this date    6.  Given implementation of strategies, patient will complete a task following initial attempt with 90% accuracy and reported reduced number on the relative scale of cognitive load when compared to previous trial.     Progressing/ Not Met 3/14/2025   Established this date    7. Patient will complete a functional  task to improve attention, memory and/or executive functions with 80% accuracy and natural environment noise distractions (TV news background, music, etc.).     Progressing/ Not Met 3/14/2025   Established this date        Long Term Goals: (8 weeks) Current Progress:   1. Patient will improve  attention skills to effectively attend to and communicate in complex daily living tasks in functional living environment.    Established this date     2. Patient will demonstrate use of self awareness,  goal setting, planning,  initiation, and  self-monitoring during daily living activities to improve safety and awareness in functional living environment.     Established this date     3. Patient will use appropriate memory strategies to schedule and recall weekly activities, express needs and recall names to maintain safety and participate socially in functional living environment.        Established this date         Plan     Recommended Treatment Plan:  Patient will participate in the [facility] rehabilitation program for speech therapy 1-2 times per week for 8 weeks to address her Cognition deficits, to educate patient and their family, and to participate in a home exercise program.      Therapist's Name:     Marcela Novak, CCC-SLP, CBIS   Speech Language Pathologist   Certified Brain Injury Specialist   3/17/2025

## 2025-04-08 ENCOUNTER — PATIENT MESSAGE (OUTPATIENT)
Dept: ADMINISTRATIVE | Facility: OTHER | Age: 75
End: 2025-04-08
Payer: MEDICARE

## 2025-04-09 ENCOUNTER — TELEPHONE (OUTPATIENT)
Dept: REHABILITATION | Facility: HOSPITAL | Age: 75
End: 2025-04-09
Payer: MEDICARE

## 2025-04-09 NOTE — TELEPHONE ENCOUNTER
Clinician called regarding missed ST visit. Patient's  reported he is unable to bring her to due illness. Next appointment scheduled for 4/16. Patient's  aware.     Marcela Novak, CCC-SLP, CBIS   Speech Language Pathologist   Certified Brain Injury Specialist   4/9/2025

## 2025-04-14 ENCOUNTER — PATIENT MESSAGE (OUTPATIENT)
Dept: NEUROLOGY | Facility: CLINIC | Age: 75
End: 2025-04-14
Payer: MEDICARE

## 2025-04-14 DIAGNOSIS — G31.84 MCI (MILD COGNITIVE IMPAIRMENT) WITH MEMORY LOSS: ICD-10-CM

## 2025-04-15 RX ORDER — TRAZODONE HYDROCHLORIDE 50 MG/1
50 TABLET ORAL NIGHTLY
Qty: 14 TABLET | Refills: 0 | Status: SHIPPED | OUTPATIENT
Start: 2025-04-15 | End: 2025-04-15 | Stop reason: SDUPTHER

## 2025-04-15 RX ORDER — TRAZODONE HYDROCHLORIDE 50 MG/1
50 TABLET ORAL NIGHTLY
Qty: 90 TABLET | Refills: 3 | Status: SHIPPED | OUTPATIENT
Start: 2025-04-15 | End: 2026-04-15

## 2025-04-16 ENCOUNTER — CLINICAL SUPPORT (OUTPATIENT)
Dept: REHABILITATION | Facility: HOSPITAL | Age: 75
End: 2025-04-16
Payer: MEDICARE

## 2025-04-16 ENCOUNTER — CLINICAL SUPPORT (OUTPATIENT)
Dept: REHABILITATION | Facility: HOSPITAL | Age: 75
End: 2025-04-16
Attending: PHYSICAL THERAPIST
Payer: MEDICARE

## 2025-04-16 DIAGNOSIS — R41.841 COGNITIVE COMMUNICATION DISORDER: Primary | ICD-10-CM

## 2025-04-16 DIAGNOSIS — R68.89 DECREASED ACTIVITY TOLERANCE: Primary | ICD-10-CM

## 2025-04-16 PROCEDURE — 97110 THERAPEUTIC EXERCISES: CPT | Performed by: PHYSICAL THERAPIST

## 2025-04-16 PROCEDURE — 97130 THER IVNTJ EA ADDL 15 MIN: CPT

## 2025-04-16 PROCEDURE — 97129 THER IVNTJ 1ST 15 MIN: CPT

## 2025-04-16 NOTE — PROGRESS NOTES
[facility]  Speech Therapy Treatment Note - Neurological Rehabilitation    Date: 4/16/2025     Name: Rosa Isela Pate   MRN: [id number]    Therapy Diagnosis:   Encounter Diagnosis   Name Primary?    Cognitive communication disorder Yes       Physician: Lacey Thorpe NP  Physician Orders: Ambulatory Referral to Speech Therapy   Medical Diagnosis:   G31.84 (ICD-10-CM) - MCI (mild cognitive impairment) with memory loss   I63.81 (ICD-10-CM) - Lacunar infarction   R41.841 (ICD-10-CM) - Cognitive communication disorder     Visit # / Visits Authorized:  1/ 10  Date of Evaluation: 3/14/2025   Insurance Authorization Period: 2/21/2025 to 12/31/2025  Plan of Care Certification: 3/14/2025 to 5/9/2025      Time In: 1035  Time Out: 1115  Total time: 40 minutes     TIMED  Procedure Min.   Cognitive Therapeutic Interventions, first 15 minutes CPT 80584 15   Cognitive Therapeutic Interventions, each additional 15 minutes CPT 67275 25     Precautions: Standard and Fall  Subjective   Patient reports: No significant changes since evaluation (3/14/2025).  Pain Scale: No pain indicated throughout session.    Objective     Short Term Goals: (6 weeks) Current Progress:   1. Patient will sustain attention to complete moderate to complex reasoning tasks for 2 minutes with one request for clarification to increase sustained attention.                        Progressing/ Not Met 4/16/2025   AUDITORY: Patient completed auditory sustained attention task of listening to a paragraph and completing auditory comprehension questions.    Trial 1: 0% accuracy given moderate verbal cueing.    Trial 2: 20% accuracy given max verbal cueing.    Trial 3: 60% accuracy given max verbal cueing, written note-taking strategy, and binary choice.   2. Patient will complete selective attention tasks (auditory or visual) with 90% accuracy independently increase selective attention.     Progressing/ Not Met 4/16/2025   Not formally addressed.   3.  Patient will use attention shifting strategies to shift attention between two tasks (auditory or visual) with no more than 3 cues or 90% accuracy to improve alternating attention.      Progressing/ Not Met 4/16/2025   Not formally addressed.    4. Patient will complete short term recall tasks after a 5 minutes delay with 90% accuracy independently  with use of memory strategies to improve recall of information and generalization of memory strategies.     Progressing/ Not Met 4/16/2025   Not formally addressed.   5. Patient will complete mental manipulation tasks with 90% accuracy to improve working memory.                   Progressing/ Not Met 4/16/2025   AUDITORY: Patient completed mental manipulation task today of providing words in reverse order than verbally provided.    Trial 1: 0% accuracy given moderate verbal cueing    Trial 2: 70% accuracy given max verbal cueing and writing strategies.   6.  Given implementation of strategies, patient will complete a task following initial attempt with 90% accuracy and reported reduced number on the relative scale of cognitive load when compared to previous trial.     Progressing/ Not Met 4/16/2025   Not formally addressed.   7. Patient will complete a functional task to improve attention, memory and/or executive functions with 80% accuracy and natural environment noise distractions (TV news background, music, etc.).     Progressing/ Not Met 4/16/2025   Not formally addressed.      Patient Education/Response:   Patient educated regarding:  Importance of attending treatment sessions for progress/continuity of care  Discharge from speech therapy after next treatment session    Home program established: Not yet established. Patient verbalized understanding to all above education provided.     See Electronic Medical Record under Patient Instructions for exercises provided throughout therapy.  Assessment:   Patient completed several visual and auditory attention tasks today.  Patient with decreased motivation/awareness of deficits. Patient has not attended therapy since initial evaluation on 3/14. Discussed potentially discharging with home exercise program due to difficulty with consistent attendance. Patient agreeable. Will discuss further at next session on 4/21.    Rosa Isela is progressing towards her goals. Current goals remain appropriate. Goals to be updated as necessary.     Patient prognosis is Fair. Patient would continue to benefit from skilled outpatient speech and language therapy to address the deficits listed in the problem list on initial evaluation, however patient is noted to have increased difficulty with attending appointments on a consistent weekly basis.    Medical necessity is demonstrated by the following IMPAIRMENTS:  Cognition: Deficits in executive functioning, attention, and memory prevent the pt from relaying medically and safety relevant information in a timely manner in a state of emergency.   Barriers to Therapy: N/A  Patient's spiritual, cultural and educational needs considered and patient agreeable to plan of care and discharge of therapy following next treatment session.  Plan:   Continue Plan of Care with plan to discuss discharge from speech therapy next session as pt is with increased difficulty with attending appointments on a consistent weekly basis. Patient should seek outpatient speech therapy services following discharge if speech, language, cognitive, and/or swallowing difficulties arise and consistently attending appointments is applicable for the patient/patient's family.    Therapist's Name:   NUPUR Ahumada  4/16/2025     Marcela Novak, CCC-SLP, CBIS   Speech Language Pathologist   Certified Brain Injury Specialist   4/17/2025

## 2025-04-17 ENCOUNTER — PATIENT MESSAGE (OUTPATIENT)
Dept: NEUROLOGY | Facility: CLINIC | Age: 75
End: 2025-04-17
Payer: MEDICARE

## 2025-04-17 NOTE — PROGRESS NOTES
Outpatient Rehab    Physical Therapy Visit    Patient Name: Rosa Isela Pate  MRN: [id number]  YOB: 1950  Encounter Date: 4/16/2025    Therapy Diagnosis:   Encounter Diagnosis   Name Primary?    Decreased activity tolerance Yes     Physician: Lacey Thorpe NP    Physician Orders: Eval and Treat  Medical Diagnosis: Class 1 obesity due to excess calories with serious comorbidity and body mass index (BMI) of 30.0 to 30.9 in adult  Decreased strength of lower extremity    Visit # / Visits Authorized:  2 / 10  Insurance Authorization Period: 2/21/2025 to 12/31/2025  Date of Evaluation: 2/21/2025  Plan of Care Certification: 2/21/2025 to 4/21/2025     PT/PTA:     Number of PTA visits since last PT visit:   Time In: 1018   Time Out: 1033  Total Time: 15   Total Billable Time:  15 Minutes     FOTO:  Intake Score:  %  Survey Score 1:  %  Survey Score 2:  %         Subjective   Patient arrived late to session and had ST immediately after; therefore shortened session time..         Objective            Treatment:       CPT Intervention Performed   Today Duration / Intensity   MT      TE Nu-step  x 9 Minutes     FOTO assessment  x See media           NMR                    TA                                                PLAN               CPT Codes available for Billing:   (00) minutes of Manual therapy (MT) to improve pain and ROM.  (15) minutes of Therapeutic Exercise (TE) to develop strength, endurance, range of motion, and flexibility.  (00) minutes of Neuromuscular Re-Education (NMR)  to improve: Balance, Coordination, Kinesthetic, Sense, Proprioception, and Posture.  (00) minutes of Therapeutic Activities (TA) to improve functional performance.  Vasopneumatic Device Therapy () for management of swelling/edema. (87641)  Unattended Electrical Stimulation (ES) for muscle performance or pain modulation.  BFR: Blood flow restriction applied during exercise  Time Entry(in minutes):       Assessment & Plan    Assessment: Discussed POC with patient and she wishes to discharge at next session.  She will be issued HEP for continued strengthening.       Patient will continue to benefit from skilled outpatient physical therapy to address the deficits listed in the problem list box on initial evaluation, provide pt/family education and to maximize pt's level of independence in the home and community environment.     Patient's spiritual, cultural, and educational needs considered and patient agreeable to plan of care and goals.           Plan: Continue with strengthening.    Goals:     Migdalia Logan, PT, DPT

## 2025-04-21 ENCOUNTER — CLINICAL SUPPORT (OUTPATIENT)
Dept: REHABILITATION | Facility: HOSPITAL | Age: 75
End: 2025-04-21
Payer: MEDICARE

## 2025-04-21 DIAGNOSIS — R68.89 DECREASED ACTIVITY TOLERANCE: Primary | ICD-10-CM

## 2025-04-21 DIAGNOSIS — R41.841 COGNITIVE COMMUNICATION DISORDER: Primary | ICD-10-CM

## 2025-04-21 PROCEDURE — 97130 THER IVNTJ EA ADDL 15 MIN: CPT

## 2025-04-21 PROCEDURE — 97112 NEUROMUSCULAR REEDUCATION: CPT | Mod: CQ

## 2025-04-21 PROCEDURE — 97110 THERAPEUTIC EXERCISES: CPT | Mod: CQ

## 2025-04-21 PROCEDURE — 97129 THER IVNTJ 1ST 15 MIN: CPT

## 2025-04-21 NOTE — PROGRESS NOTES
"  Outpatient Rehab    Physical Therapy Visit    Patient Name: Rosa Isela Pate  MRN: [id number]  YOB: 1950  Encounter Date: 4/21/2025    Therapy Diagnosis:   Encounter Diagnosis   Name Primary?    Decreased activity tolerance Yes     Physician: Lacey Thorpe NP    Physician Orders: Eval and Treat  Medical Diagnosis: Class 1 obesity due to excess calories with serious comorbidity and body mass index (BMI) of 30.0 to 30.9 in adult  Decreased strength of lower extremity    Visit # / Visits Authorized:  3 / 10  Insurance Authorization Period: 2/21/2025 to 12/31/2025  Date of Evaluation: 2/21/2025  Plan of Care Certification: 2/21/2025 to 4/21/2025     PT/PTA: PTA   Number of PTA visits since last PT visit:1  Time In: 1500   Time Out: 1130  Total Time: 1230   Total Billable Time:  45 Minutes     FOTO:  Intake Score:  %  Survey Score 1:  %  Survey Score 2:  %         Subjective   pt states she wants to make this visit her last treatment and would not like to schedule more either PT or ST appointments..         Objective       BALANCE:    Evaluation   30 second Chair Rise 8 completed with arms      Postural control:  MCTSIB:  1. Eyes Open/feet together/Firm: 1 minute   2. Eyes Closed/feet together/Firm: 1 minute   3. Eyes Open/feet together/Foam: 1 minute  4. Eyes Closed/feet together/Foam: 1 minute     Endurance Deficit: fair (pt felt "tired" after MCTSIB)        Evaluation   Timed Up and Go 9.2 sec            Functional Gait Assessment:   1. Gait on level surface =  3              (3) Normal: less than 5.5 sec, no A.D., no imbalance, normal gait pattern, deviates< 6in              (2) Mild impairment: 7-5.6 sec, uses A.D., mild gait deviations, or deviates 6-10 in              (1) Moderate impairment: > 7 sec, slow speed, imbalance, deviates 10-15 in.              (0) Severe impairment: needs assist, deviates >15 in, reach/touch wall  2. Change in Gait Speed = 3              (3) Normal: smooth change w/o " loss of balance or gait deviation, deviates < 6 in, significant difference between speeds              (2) Mild impairment: changes speed, but demonstrates mild gait deviations, deviates 6-10 in, OR no deviations but unable to significantly speed, OR uses A.D.              (1) Moderate impairment: minor changes to speed, OR changes speed w/ significant deviations, deviates 10-15 in, OR  Changes speed , but loses balance & recovers              (0) Severe impairment: cannot change speed, deviates >15 in, or loses balance & needs assist  3. Gait with horizontal head turns  = 3              (3) Normal: no change in gait, deviates <6 in              (2) Mild impairment: slight change in speed, deviates 6-10 in, OR uses A.D.              (1) Moderate impairment: moderate change in speed, deviates 10-15 in              (0) Severe impairment: severe disruption of gait, deviates >15in  4. Gait with vertical head turns = 3              (3) Normal: no change in gait, deviates <6 in              (2) Mild impairment: slight change in speed, deviates 6-10 in OR uses A.D.              (1) Moderate impairment: moderate change in speed, deviates 10-15 in              (0) Severe impairment: severe disruption of gait, deviates >15 in  5. Gait with pivot turns = 3              (3) Normal: performs safely in 3 sec, no LOB              (2) Mild impairment: performs in >3 sec & no LOB, OR turns safely & requires several steps to regain LOB              (1) Moderate impairment: turns slow, OR requires several small steps for balance following turn & stop              (0) Severe impairment: cannot turn safely, needs assist  6. Step over obstacle = 2              (3) Normal: steps over 2 stacked boxes w/o change in speed or LOB              (2) Mild impairment: able to step over 1 box w/o change in speed or LOB              (1) Moderate impairment: steps over 1 box but must slow down, may require VC              (0) Severe impairment:  cannot perform w/o assist  7. Gait with Narrow NADIRA = 3              (3) Normal: 10 steps no staggering              (2) Mild impairment: 7-9 steps              (1) Moderate impairment: 4-7 steps              (0) Severe impairment: < 4 steps or cannot perform w/o assist  8. Gait with eyes closed = 3              (3) Normal: < 7 sec, no A.D., no LOB, normal gait pattern, deviates <6 in              (2) Mild impairment: 7.1-9 sec, mild gait deviations, deviates 6-10 in              (1) Moderate impairment: > 9 sec, abnormal pattern, LOB, deviates 10-15 in              (0) Severe impairment: cannot perform w/o assist, LOB, deviates >15in  9. Ambulating Backwards = 3              (3) Normal: no A.D., no LOB, normal gait pattern, deviates <6in              (2) Mild impairment: uses A.D., slower speed, mild gait deviations, deviates 6-10 in              (1) Moderate impairment: slow speed, abnormal gait pattern, LOB, deviates 10-15 in              (0) Severe impairment: severe gait deviations or LOB, deviates >15in  10. Steps = 3              (3) Normal: alternating feet, no rail              (2) Mild Impairment: alternating feet, uses rail              (1) Moderate impairment: step-to, uses rail              (0) Severe impairment: cannot perform safely     Score 29/30      Score:   <22/30 fall risk   <20/30 fall risk in older adults   <18/30 fall risk in Parkinsons            Treatment:       CPT Intervention Performed   Today Duration / Intensity   MT      TE Nu-step  x 9 Minutes     FOTO assessment  x See media           NMR Heel raises  x X 30 no hands in parallel bars     March in place  x X 30 no hands in parallel bars                       TA                                                PLAN               CPT Codes available for Billing:   (00) minutes of Manual therapy (MT) to improve pain and ROM.  (30) minutes of Therapeutic Exercise (TE) to develop strength, endurance, range of motion, flexibility obtaining  "objective measurements, and completing FOTO.  (15) minutes of Neuromuscular Re-Education (NMR)  to improve: Balance, Coordination, Kinesthetic, Sense, Proprioception, and Posture.  (00) minutes of Therapeutic Activities (TA) to improve functional performance.  Vasopneumatic Device Therapy () for management of swelling/edema. ([id number])  Unattended Electrical Stimulation (ES) for muscle performance or pain modulation.  BFR: Blood flow restriction applied during exercise  Time Entry(in minutes):       Assessment & Plan   Assessment: pt is disintered in continuing physical therapy. she is able to show an improvemnet in her Timed Up and Go test, her Functional Gait Assessment test, and all her MCTSIB times. she was given the option to retest her Sit To Stand test but declined saying, "That's good enough".       Patient will continue to benefit from skilled outpatient physical therapy to address the deficits listed in the problem list box on initial evaluation, provide pt/family education and to maximize pt's level of independence in the home and community environment.     Patient's spiritual, cultural, and educational needs considered and patient agreeable to plan of care and goals.           Plan: pt is dsicharged from skilled physical therapy servises at this time. see discharge summary by supervising physical therapist.    Goals:       Goals:  Short Term Goals: 4 weeks   Pt will be able to perform 12 reps of sit to stands during 30 sec sit to stand test in order to decrease fall risk and improve functional mobility/endurance NOT MET  Pt will be able to achieve 24/30 on FGA in order to reduce fall risk and improve functional mobility MET  Pt will report no falls in the last 4 weeks in order to reduce risk of injury and improve functional independence MET  Pt will     Long Term Goals: 8 weeks   Pt will be able to perform 13 reps of sit to stands during 30 sec sit to stand test in order to decrease fall risk and improve " functional mobility/endurance NOT MET  Pt will be able to achieve 26/30 on FGA in order to reduce fall risk and improve functional mobility MET  Pt will report that she feels like she   Pt will  Plan      Plan of care Certification: 2/21/2025 to 4/21/2025.     Outpatient Physical Therapy 1 times weekly for 8 weeks to include the following interventions: Gait Training, Neuromuscular Re-ed, Patient Education, Therapeutic Activities, and Therapeutic Exercise.     Kwame Dobbs, PTA

## 2025-04-21 NOTE — PROGRESS NOTES
[facility]  Speech Therapy Treatment Note/Discharge Note - Neurological Rehabilitation    Date: 4/21/2025     Name: Rosa Isela Pate   MRN: [id number]    Therapy Diagnosis:   Encounter Diagnosis   Name Primary?    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   Cognitive Therapeutic Interventions, first 15 minutes CPT 04491 15   Cognitive Therapeutic Interventions, each additional 15 minutes CPT 32312 15     Precautions: Standard and Fall  Subjective   Patient reports: No significant changes since last session.  Pain Scale: No pain indicated throughout session.    Objective     Short Term Goals: (6 weeks) Current Progress:   1. Patient will sustain attention to complete moderate to complex reasoning tasks for 2 minutes with one request for clarification to increase sustained attention.                                                  Progressing/ Not Met 4/21/2025 AUDITORY:   Patient completed auditory sustained attention task of immediate recall of digits read aloud.  Patient recalls 4 digits with 100% accuracy.   Patient recalled 5 digits with  60% accuracy independently; 80% accuracy given one repetition of stimuli.    Patient completed auditory sustained attention task of immediate recall of voicemails with 90% accuracy given minimal cueing.     VISUAL:   Patient completed visual sustained attention task of filling in a pill box with four medications with 80%  accuracy given minimal cueing; 100% accuracy given moderate cueing.     Patient completed visual sustained attention task of N-Back Level 1 (pictures) with 74% accuracy given minimal cueing.     2. Patient will complete selective attention tasks (auditory or visual) with 90% accuracy independently increase selective attention.     Progressing/ Not Met 4/21/2025  Not formally addressed.   3. Patient will use attention shifting strategies to shift attention between two tasks (auditory or visual) with no more than 3 cues or 90% accuracy to improve alternating attention.      Progressing/ Not Met 4/21/2025 Not formally addressed.    4. Patient will complete short term recall tasks after a 5 minutes delay with 90% accuracy independently  with use of memory strategies to improve recall of information and generalization of memory strategies.     Progressing/ Not Met 4/21/2025  Not formally addressed.   5. Patient will complete mental manipulation tasks with 90% accuracy to improve working memory.     Progressing/ Not Met 4/21/2025  Not formally addressed.    6.  Given implementation of strategies, patient will complete a task following initial attempt with 90% accuracy and reported reduced number on the relative scale of cognitive load when compared to previous trial.     Progressing/ Not Met 4/21/2025 Not formally addressed.   7. Patient will complete a functional task to improve attention, memory and/or executive functions with 80% accuracy and natural environment noise distractions (TV news background, music, etc.).     Progressing/ Not Met 4/21/2025 Not formally addressed.      Patient Education/Response:   Patient educated regarding:  Importance of attending treatment sessions for progress/continuity of care  Discharge from speech therapy after next treatment session    Home program established: Not yet established. Patient verbalized understanding to all above education provided.     See Electronic Medical Record  under Patient Instructions for exercises provided throughout therapy.  Assessment:   Patient completed several visual and auditory attention tasks today. Patient with decreased motivation/awareness of deficits. This is patient's last scheduled visit. Discussed scheduling more visits versus d/c. Patient does not wish to schedule any more visits at this time. She was advised to reach out with any future concerns or if she wished to re-initiate therapy.    Rosa Isela is progressing towards her goals. Current goals remain appropriate. Goals to be updated as necessary.     Patient prognosis is Fair.     Medical necessity is demonstrated by the following IMPAIRMENTS:  Cognition: Deficits in executive functioning, attention, and memory prevent the pt from relaying medically and safety relevant information in a timely manner in a state of emergency.   Barriers to Therapy: N/A  Patient's spiritual, cultural and educational needs considered and patient agreeable to plan of care and discharge of therapy following next treatment session.  Plan:   D/C from ST d/t patient's request.     Marcela Novak, CCC-SLP, CBIS   Speech Language Pathologist   Certified Brain Injury Specialist   4/21/2025

## 2025-05-11 ENCOUNTER — PATIENT MESSAGE (OUTPATIENT)
Dept: ADMINISTRATIVE | Facility: OTHER | Age: 75
End: 2025-05-11
Payer: MEDICARE

## 2025-05-16 ENCOUNTER — PATIENT MESSAGE (OUTPATIENT)
Dept: INTERNAL MEDICINE | Facility: CLINIC | Age: 75
End: 2025-05-16
Payer: MEDICARE

## 2025-05-16 DIAGNOSIS — F32.2 MAJOR DEPRESSIVE DISORDER, SINGLE EPISODE, SEVERE WITHOUT PSYCHOTIC FEATURES: ICD-10-CM

## 2025-05-16 RX ORDER — AMLODIPINE BESYLATE 10 MG/1
10 TABLET ORAL DAILY
Qty: 90 TABLET | Refills: 1 | Status: SHIPPED | OUTPATIENT
Start: 2025-05-16

## 2025-05-16 NOTE — TELEPHONE ENCOUNTER
Provider Staff:  Action required for this patient    Requires labs      Please see care gap opportunities below in Care Due Message.    Thanks!  [facility]     Appointments      Date Provider   Last Visit   11/14/2024 Sherrell Drummond MD   Next Visit   Visit date not found Sherrell Drummond MD     Refill Decision Note   Rosa Isela Pate  is requesting a refill authorization.  Brief Assessment and Rationale for Refill:  Approve     Medication Therapy Plan:         Comments:     Note composed:11:01 AM 05/16/2025

## 2025-05-16 NOTE — TELEPHONE ENCOUNTER
Care Due:                  Date            Visit Type   Department     Provider  --------------------------------------------------------------------------------                                MYCHART                              FOLLOWUP/OF  [facility]  Last Visit: 11-      [facility] VISIT   MEDICINE       Sherrell Drummond  Next Visit: None Scheduled  None         None Found                                                            Last  Test          Frequency    Reason                     Performed    Due Date  --------------------------------------------------------------------------------    CMP.........  12 months..  atorvastatin.............  05- 04-    Lipid Panel.  12 months..  atorvastatin.............  05- 04-    [employer] Embedded Care Due Messages. Reference number: [id number].   5/16/2025 9:41:57 AM CDT

## 2025-06-04 PROBLEM — F32.2 MAJOR DEPRESSIVE DISORDER, SINGLE EPISODE, SEVERE WITHOUT PSYCHOTIC FEATURES: Status: ACTIVE | Noted: 2025-06-04

## 2025-06-04 PROBLEM — I69.351 HEMIPLEGIA AND HEMIPARESIS FOLLOWING CEREBRAL INFARCTION AFFECTING RIGHT DOMINANT SIDE: Status: ACTIVE | Noted: 2025-06-04

## 2025-06-05 ENCOUNTER — LAB VISIT (OUTPATIENT)
Dept: LAB | Facility: HOSPITAL | Age: 75
End: 2025-06-05
Payer: MEDICARE

## 2025-06-05 ENCOUNTER — OFFICE VISIT (OUTPATIENT)
Dept: INTERNAL MEDICINE | Facility: CLINIC | Age: 75
End: 2025-06-05
Payer: MEDICARE

## 2025-06-05 VITALS
BODY MASS INDEX: 29.21 KG/M2 | HEIGHT: 63 IN | SYSTOLIC BLOOD PRESSURE: 132 MMHG | HEART RATE: 81 BPM | TEMPERATURE: 98 F | OXYGEN SATURATION: 94 % | WEIGHT: 164.88 LBS | DIASTOLIC BLOOD PRESSURE: 64 MMHG

## 2025-06-05 DIAGNOSIS — E78.5 HYPERLIPIDEMIA, UNSPECIFIED HYPERLIPIDEMIA TYPE: ICD-10-CM

## 2025-06-05 DIAGNOSIS — N18.31 CHRONIC KIDNEY DISEASE, STAGE 3A: ICD-10-CM

## 2025-06-05 DIAGNOSIS — R73.03 PREDIABETES: ICD-10-CM

## 2025-06-05 DIAGNOSIS — G31.84 MCI (MILD COGNITIVE IMPAIRMENT): Primary | ICD-10-CM

## 2025-06-05 DIAGNOSIS — F32.2 MAJOR DEPRESSIVE DISORDER, SINGLE EPISODE, SEVERE WITHOUT PSYCHOTIC FEATURES: ICD-10-CM

## 2025-06-05 DIAGNOSIS — I69.351 HEMIPLEGIA AND HEMIPARESIS FOLLOWING CEREBRAL INFARCTION AFFECTING RIGHT DOMINANT SIDE: ICD-10-CM

## 2025-06-05 DIAGNOSIS — I10 ESSENTIAL HYPERTENSION: ICD-10-CM

## 2025-06-05 LAB
ABSOLUTE EOSINOPHIL (OHS): 0.2 K/UL
ABSOLUTE MONOCYTE (OHS): 0.51 K/UL (ref 0.3–1)
ABSOLUTE NEUTROPHIL COUNT (OHS): 2.39 K/UL (ref 1.8–7.7)
ALBUMIN SERPL BCP-MCNC: 4.2 G/DL (ref 3.5–5.2)
ALP SERPL-CCNC: 66 UNIT/L (ref 40–150)
ALT SERPL W/O P-5'-P-CCNC: 23 UNIT/L (ref 10–44)
ANION GAP (OHS): 11 MMOL/L (ref 8–16)
AST SERPL-CCNC: 26 UNIT/L (ref 11–45)
BASOPHILS # BLD AUTO: 0.05 K/UL
BASOPHILS NFR BLD AUTO: 0.9 %
BILIRUB SERPL-MCNC: 0.6 MG/DL (ref 0.1–1)
BUN SERPL-MCNC: 24 MG/DL (ref 8–23)
CALCIUM SERPL-MCNC: 9.7 MG/DL (ref 8.7–10.5)
CHLORIDE SERPL-SCNC: 109 MMOL/L (ref 95–110)
CHOLEST SERPL-MCNC: 131 MG/DL (ref 120–199)
CHOLEST/HDLC SERPL: 2.6 {RATIO} (ref 2–5)
CO2 SERPL-SCNC: 26 MMOL/L (ref 23–29)
CREAT SERPL-MCNC: 1 MG/DL (ref 0.5–1.4)
EAG (OHS): 111 MG/DL (ref 68–131)
ERYTHROCYTE [DISTWIDTH] IN BLOOD BY AUTOMATED COUNT: 14.1 % (ref 11.5–14.5)
GFR SERPLBLD CREATININE-BSD FMLA CKD-EPI: 59 ML/MIN/1.73/M2
GLUCOSE SERPL-MCNC: 123 MG/DL (ref 70–110)
HBA1C MFR BLD: 5.5 % (ref 4–5.6)
HCT VFR BLD AUTO: 39.6 % (ref 37–48.5)
HDLC SERPL-MCNC: 51 MG/DL (ref 40–75)
HDLC SERPL: 38.9 % (ref 20–50)
HGB BLD-MCNC: 12.6 GM/DL (ref 12–16)
IMM GRANULOCYTES # BLD AUTO: 0.03 K/UL (ref 0–0.04)
IMM GRANULOCYTES NFR BLD AUTO: 0.5 % (ref 0–0.5)
LDLC SERPL CALC-MCNC: 44.4 MG/DL (ref 63–159)
LYMPHOCYTES # BLD AUTO: 2.5 K/UL (ref 1–4.8)
MCH RBC QN AUTO: 30.4 PG (ref 27–31)
MCHC RBC AUTO-ENTMCNC: 31.8 G/DL (ref 32–36)
MCV RBC AUTO: 95 FL (ref 82–98)
NONHDLC SERPL-MCNC: 80 MG/DL
NUCLEATED RBC (/100WBC) (OHS): 0 /100 WBC
PLATELET # BLD AUTO: 234 K/UL (ref 150–450)
PMV BLD AUTO: 10.3 FL (ref 9.2–12.9)
POTASSIUM SERPL-SCNC: 3.9 MMOL/L (ref 3.5–5.1)
PROT SERPL-MCNC: 7.4 GM/DL (ref 6–8.4)
RBC # BLD AUTO: 4.15 M/UL (ref 4–5.4)
RELATIVE EOSINOPHIL (OHS): 3.5 %
RELATIVE LYMPHOCYTE (OHS): 44 % (ref 18–48)
RELATIVE MONOCYTE (OHS): 9 % (ref 4–15)
RELATIVE NEUTROPHIL (OHS): 42.1 % (ref 38–73)
SODIUM SERPL-SCNC: 146 MMOL/L (ref 136–145)
TRIGL SERPL-MCNC: 178 MG/DL (ref 30–150)
WBC # BLD AUTO: 5.68 K/UL (ref 3.9–12.7)

## 2025-06-05 PROCEDURE — 36415 COLL VENOUS BLD VENIPUNCTURE: CPT

## 2025-06-05 PROCEDURE — 1160F RVW MEDS BY RX/DR IN RCRD: CPT | Mod: CPTII,S$GLB,,

## 2025-06-05 PROCEDURE — 1159F MED LIST DOCD IN RCRD: CPT | Mod: CPTII,S$GLB,,

## 2025-06-05 PROCEDURE — 99214 OFFICE O/P EST MOD 30 MIN: CPT | Mod: S$GLB,,,

## 2025-06-05 PROCEDURE — 1126F AMNT PAIN NOTED NONE PRSNT: CPT | Mod: CPTII,S$GLB,,

## 2025-06-05 PROCEDURE — 3008F BODY MASS INDEX DOCD: CPT | Mod: CPTII,S$GLB,,

## 2025-06-05 PROCEDURE — 3078F DIAST BP <80 MM HG: CPT | Mod: CPTII,S$GLB,,

## 2025-06-05 PROCEDURE — 80061 LIPID PANEL: CPT

## 2025-06-05 PROCEDURE — 83036 HEMOGLOBIN GLYCOSYLATED A1C: CPT

## 2025-06-05 PROCEDURE — 85025 COMPLETE CBC W/AUTO DIFF WBC: CPT

## 2025-06-05 PROCEDURE — 3075F SYST BP GE 130 - 139MM HG: CPT | Mod: CPTII,S$GLB,,

## 2025-06-05 PROCEDURE — 80053 COMPREHEN METABOLIC PANEL: CPT

## 2025-06-05 PROCEDURE — 3044F HG A1C LEVEL LT 7.0%: CPT | Mod: CPTII,S$GLB,,

## 2025-06-05 PROCEDURE — 99999 PR PBB SHADOW E&M-EST. PATIENT-LVL IV: CPT | Mod: PBBFAC,,,

## 2025-06-05 PROCEDURE — 1101F PT FALLS ASSESS-DOCD LE1/YR: CPT | Mod: CPTII,S$GLB,,

## 2025-06-05 PROCEDURE — 3288F FALL RISK ASSESSMENT DOCD: CPT | Mod: CPTII,S$GLB,,

## 2025-06-05 PROCEDURE — G2211 COMPLEX E/M VISIT ADD ON: HCPCS | Mod: S$GLB,,,

## 2025-06-05 NOTE — PROGRESS NOTES
Rosa Isela Pate  06/10/2025  [id number]    Sherrell Drummond MD  Patient Care Team:  Sherrell Drummond MD as PCP - General (Family Medicine)  Anahi Diana LPN as Care Coordinator  Qian Qureshi MD as Hypertension Digital Medicine Responsible Provider (Internal Medicine)  Qian Qureshi MD as Hyperlipidemia Digital Medicine Responsible Provider (Internal Medicine)  Migdalia Grady LCSW as  (Psychiatry)  Lacey Thorpe NP as Nurse Practitioner (Neurology)  Christopher Manrique OD as Consulting Physician (Optometry)  Medicare, Digital Medicine as Hypertension Digital Medicine Contract  Margo Patterson PharmD as Hypertension Digital Medicine Clinician (Pharmacist)  Margo Patterson, PharmD as Hyperlipidemia Digital Medicine Clinician (Pharmacist)          Visit Type:a scheduled routine follow-up visit    Chief Complaint:  Chief Complaint   Patient presents with    Annual Exam       History of Present Illness:    History of Present Illness    CHIEF COMPLAINT:  Ms. Pate presents today for medication management    MEDICATION MANAGEMENT:  She reports non-compliance with her prescribed 13 daily medications, including blood pressure medications, vitamins, and aspirin. She expresses feeling confused and tired when taking medications, and reports improved cognition and awareness when not taking them. She is uncertain which medications she takes consistently and expresses interest in reducing her medication regimen. Her sister is at the visit with her, she feels that she is taking too many medicines. She often times does not take her medicines. She is not sure which medicines she does not take.     NEUROLOGICAL HISTORY:  She has a history of stroke and cognitive decline with memory problems previously evaluated by neurology. Family reports implementation of memory aids and organizational strategies when patient lived with them, which improved her cognitive function and reduced  agitation.    SOCIAL HISTORY:  She lives with her  and no longer drives per medical advice.          ROS:  General: -fever, -chills, +fatigue, -weight gain, -weight loss  Eyes: -vision changes, -redness, -discharge  ENT: -ear pain, -nasal congestion, -sore throat  Cardiovascular: -chest pain, -palpitations, -lower extremity edema  Respiratory: -cough, -shortness of breath  Gastrointestinal: -abdominal pain, -nausea, -vomiting, -diarrhea, -constipation, -blood in stool  Genitourinary: -dysuria, -hematuria, -frequency  Musculoskeletal: -joint pain, -muscle pain  Skin: -rash, -lesion  Neurological: -headache, -dizziness, -numbness, -tingling, +confusion or disorientation, +confusion, +memory loss  Psychiatric: -anxiety, -depression, -sleep difficulty, +agitation, +new or worsening mood changes, +mood swings            History:  Past Medical History:   Diagnosis Date    Anxiety 1/31/2025    Chronic kidney disease, stage 3a 04/20/2023    Depression     Hyperlipidemia     Hypertension     Obesity     Stroke     Tobacco dependence      Past Surgical History:   Procedure Laterality Date    CHOLECYSTECTOMY      removal    COLONOSCOPY N/A 11/1/2017    Procedure: COLONOSCOPY;  Surgeon: Francisco Javier Bai MD;  Location: [facility] ENDO;  Service: Endoscopy;  Laterality: N/A;    COLONOSCOPY N/A 6/26/2023    Procedure: COLONOSCOPY;  Surgeon: Vicki Krishna MD;  Location: [facility];  Service: Endoscopy;  Laterality: N/A;    HYSTERECTOMY      MYOMECTOMY      removal    OOPHORECTOMY      TUBAL LIGATION       Family History   Problem Relation Name Age of Onset    Stroke Mother      Hypertension Mother      Hypertension Father      Heart disease Father      Cancer Sister          unknown    Alcohol abuse Brother      Diabetes Neg Hx       Social History[1]  Problem List[2]  Review of patient's allergies indicates:   Allergen Reactions    Enalapril Other (See Comments)     Ace cough. She doesn't recall this medication or a  history of medication allergies/adverse reactions.       The following were reviewed at this visit: active problem list, medication list, allergies, family history, social history, and health maintenance.    Medications:  Medications Ordered Prior to Encounter[3]    Medications have been reviewed and reconciled with patient at this visit.  Barriers to medications reviewed with patient.    Adverse reactions to current medications reviewed with patient..    Over the counter medications reviewed and reconciled with patient.    Exam:  Wt Readings from Last 3 Encounters:   06/05/25 74.8 kg (164 lb 14.5 oz)   01/31/25 73.3 kg (161 lb 9.6 oz)   12/02/24 73 kg (160 lb 15 oz)     Temp Readings from Last 3 Encounters:   06/05/25 97.8 °F (36.6 °C) (Tympanic)   12/02/24 96 °F (35.6 °C) (Tympanic)   11/14/24 96.5 °F (35.8 °C) (Tympanic)     BP Readings from Last 3 Encounters:   06/05/25 132/64   01/31/25 131/69   12/02/24 134/78     Pulse Readings from Last 3 Encounters:   06/05/25 81   01/31/25 81   12/02/24 69     Body mass index is 29.21 kg/m².    Physical Exam  Nursing note reviewed.   HENT:      Head: Normocephalic and atraumatic.   Cardiovascular:      Rate and Rhythm: Normal rate and regular rhythm.      Heart sounds: Normal heart sounds.   Pulmonary:      Effort: Pulmonary effort is normal. No respiratory distress.      Breath sounds: Normal breath sounds.   Neurological:      Mental Status: She is alert.           Laboratory Reviewed ({Yes)  Lab Results   Component Value Date    WBC 5.68 06/05/2025    HGB 12.6 06/05/2025    HCT 39.6 06/05/2025     06/05/2025    CHOL 131 06/05/2025    TRIG 178 (H) 06/05/2025    HDL 51 06/05/2025    ALT 23 06/05/2025    AST 26 06/05/2025     (H) 06/05/2025    K 3.9 06/05/2025     06/05/2025    CREATININE 1.0 06/05/2025    BUN 24 (H) 06/05/2025    CO2 26 06/05/2025    TSH 1.511 10/26/2023    INR 1.0 10/27/2022    HGBA1C 5.5 06/05/2025         Rosa Isela was seen today for  annual exam.    Diagnoses and all orders for this visit:    MCI (mild cognitive impairment)  -     CBC Auto Differential; Future  -     Comprehensive Metabolic Panel; Future    Essential hypertension    Hyperlipidemia, unspecified hyperlipidemia type  -     CBC Auto Differential; Future  -     Lipid Panel; Future    Chronic kidney disease, stage 3a  -     Comprehensive Metabolic Panel; Future    Prediabetes  -     Hemoglobin A1C; Future    Major depressive disorder, single episode, severe without psychotic features  -     CBC Auto Differential; Future  -     Comprehensive Metabolic Panel; Future    Hemiplegia and hemiparesis following cerebral infarction affecting right dominant side  -     CBC Auto Differential; Future  -     Comprehensive Metabolic Panel; Future    Assessment & Plan    MAJOR DEPRESSIVE DISORDER, SINGLE EPISODE, SEVERE WITHOUT PSYCHOTIC FEATURES:  - Evaluated need for psychiatric consultation as previously recommended.  - Discussed the patient's mental health, including memory problems, cognitive decline, and mood issues.  - Will review and possibly adjust medications to manage symptoms better, including reducing the number of medications and their dosages.  - Ms. Pate instructed to bring all medications to the next appointment for review.    HEMIPLEGIA AND HEMIPARESIS FOLLOWING CEREBRAL INFARCTION AFFECTING RIGHT DOMINANT SIDE:  - Assessed cognitive status and functional abilities in light of reported stroke history and family concerns.  - Ms. Pate has a history of stroke with resulting cognitive decline, memory problems, organizational issues, and agitation.  - Documented that neurology has advised against driving due to memory loss.    MILD NEUROCOGNITIVE DISORDER DUE TO STROKE:  - Ms. Pate demonstrates cognitive decline that is not improving with current management.  - Neurology's assessment was based on the 's presentation at the appointment.  - Will consider referral to  psychiatry.      MEDICATION MANAGEMENT AND COMPLIANCE:  - Ms. Pate reports feeling better when not taking medications and is not consistently compliant with prescribed regimen.  - Will review complete medication list to identify opportunities for deprescribing or dose adjustments.  - Educated patient on risks of abruptly stopping medications, particularly antihypertensive and anti-seizure medications, and discussed importance of consistent adherence to avoid withdrawal effects and maintain therapeutic benefits.  - Instructed patient to chart medication intake for 1 week prior to next appointment and obtain access to patient portal ([employer]) to review medication list.    FOLLOW-UP AND ADDITIONAL TESTS:  - Scheduled follow up in 1 week to review medication chart and discuss potential medication adjustments.  - Ordered labs.     Patient's sister states that she will call to schedule a follow up appt and bring meds to the visit    At her last appt with Neuro, referral was placed to our psych department.     Visit today included increased complexity associated with the care of the episodic problem MCI , which was addressed while instituting co-management of the longitudinal care of the patient due to the serious and/or complex managed problem(s) .    I have evaluated and discussed management associated with medical care services that serve as the continuing focal point for all needed health care services and/or with medical care services that are part of ongoing care related to my patient's single, serious condition or a complex condition(s).    I am providing ongoing care and I am the primary care provider for this patient, and they are being managed, monitored, and/or observed for their chronic conditions over time.     I have addressed their ongoing health maintenance requirements and needs for all health care services and reviewed co-management plans provided by specialty providers when available.    [facility]  Maintenance Due   Topic Date Due    RSV Vaccine (Age 60+ and Pregnant patients) (1 - Risk 60-74 years 1-dose series) Never done    Shingles Vaccine (2 of 2) 10/05/2021                 Care Plan/Goals: Reviewed    Goals        Blood Pressure < 140/90      Exercise at least 150 minutes per week.      LDL CHOLESTEROL < 70      Take at least one BP reading per week at various times of the day             Follow up: No follow-ups on file.    After visit summary was printed and given to patient upon discharge today.  Patient goals and care plan are included in After Visit Summary.    This note was generated with the assistance of ambient listening technology. Verbal consent was obtained by the patient and accompanying visitor(s) for the recording of patient appointment to facilitate this note. I attest to having reviewed and edited the generated note for accuracy, though some syntax or spelling errors may persist. Please contact the author of this note for any clarification.            [1]   Social History  Socioeconomic History    Marital status:    Tobacco Use    Smoking status: Every Day     Current packs/day: 1.00     Average packs/day: 1 pack/day for 51.0 years (51.0 ttl pk-yrs)     Types: Cigarettes    Smokeless tobacco: Never   Substance and Sexual Activity    Alcohol use: Not Currently     Comment: occ    Drug use: No    Sexual activity: Not Currently     Partners: Male     Social Drivers of Health     Financial Resource Strain: Medium Risk (5/11/2025)    Overall Financial Resource Strain (CARDIA)     Difficulty of Paying Living Expenses: Somewhat hard   Food Insecurity: Food Insecurity Present (5/11/2025)    Hunger Vital Sign     Worried About Running Out of Food in the Last Year: Sometimes true     Ran Out of Food in the Last Year: Sometimes true   Transportation Needs: Unmet Transportation Needs (5/11/2025)    PRAPARE - Transportation     Lack of Transportation (Medical): Yes     Lack of Transportation  (Non-Medical): Yes   Physical Activity: Inactive (5/11/2025)    Exercise Vital Sign     Days of Exercise per Week: 0 days     Minutes of Exercise per Session: 0 min   Stress: Stress Concern Present (5/11/2025)    [employer] - Occupational Stress Questionnaire     Feeling of Stress : Rather much   Housing Stability: Low Risk  (5/11/2025)    Housing Stability Vital Sign     Unable to Pay for Housing in the Last Year: No     Number of Times Moved in the Last Year: 1     Homeless in the Last Year: No   [2]   Patient Active Problem List  Diagnosis    Essential hypertension    Hyperlipidemia    Vasovagal syncope    Tobacco use disorder    Class 1 obesity due to excess calories in adult    Right sided weakness    Deficit in activities of daily living (ADL)    Decreased activity tolerance    Decreased strength of lower extremity    Lacunar infarction    Major depressive disorder with single episode, in full remission    Chronic kidney disease, stage 3a    Major neurocognitive disorder due to another medical condition, with agitation    Anxiety    Other amnesia    Agitation    Stress due to family tension    Major depressive disorder, single episode, severe without psychotic features    Hemiplegia and hemiparesis following cerebral infarction affecting right dominant side   [3]   Current Outpatient Medications on File Prior to Visit   Medication Sig Dispense Refill    albuterol (VENTOLIN HFA) 90 mcg/actuation inhaler Inhale 2 puffs into the lungs every 6 (six) hours as needed for Wheezing. Rescue 18 g 3    amLODIPine (NORVASC) 10 MG tablet Take 1 tablet (10 mg total) by mouth once daily. 90 tablet 1    aspirin 81 MG Chew Take 1 tablet (81 mg total) by mouth once daily. 90 tablet 3    atorvastatin (LIPITOR) 80 MG tablet TAKE 1 TABLET BY MOUTH EVERY  NIGHT 90 tablet 3    busPIRone (BUSPAR) 10 MG tablet Take 1 tablet (10 mg total) by mouth 2 (two) times daily. 60 tablet 11    donepeziL (ARICEPT) 10  MG tablet Take 1 tablet (10 mg total) by mouth every evening. Take one half tablet for one week then as prescribed. 90 tablet 3    irbesartan-hydrochlorothiazide (AVALIDE) 150-12.5 mg per tablet TAKE 1 TABLET BY MOUTH ONCE  DAILY 90 tablet 2    lamoTRIgine (LAMICTAL) 100 MG tablet Take 1 tablet (100 mg total) by mouth 2 (two) times daily. 60 tablet 11    memantine (NAMENDA) 10 MG Tab Take 1 tablet (10 mg total) by mouth 2 (two) times daily. 180 tablet 3    montelukast (SINGULAIR) 10 mg tablet TAKE 1 TABLET(10 MG) BY MOUTH EVERY EVENING 90 tablet 3    multivitamin with minerals tablet       traZODone (DESYREL) 50 MG tablet Take 1 tablet (50 mg total) by mouth every evening. 90 tablet 3     No current facility-administered medications on file prior to visit.

## 2025-06-06 ENCOUNTER — RESULTS FOLLOW-UP (OUTPATIENT)
Dept: INTERNAL MEDICINE | Facility: CLINIC | Age: 75
End: 2025-06-06

## 2025-07-09 ENCOUNTER — PATIENT MESSAGE (OUTPATIENT)
Dept: NEUROLOGY | Facility: CLINIC | Age: 75
End: 2025-07-09
Payer: MEDICARE

## 2025-07-09 RX ORDER — LAMOTRIGINE 100 MG/1
100 TABLET ORAL 2 TIMES DAILY
Qty: 200 TABLET | Refills: 2 | Status: SHIPPED | OUTPATIENT
Start: 2025-07-09

## 2025-07-10 DIAGNOSIS — F41.9 ANXIETY: ICD-10-CM

## 2025-07-10 DIAGNOSIS — G30.9 DEMENTIA, ALZHEIMER'S, WITH BEHAVIOR DISTURBANCE: ICD-10-CM

## 2025-07-10 DIAGNOSIS — F02.811 MAJOR NEUROCOGNITIVE DISORDER DUE TO ANOTHER MEDICAL CONDITION, WITH AGITATION: Primary | ICD-10-CM

## 2025-07-10 DIAGNOSIS — Z74.8: ICD-10-CM

## 2025-07-10 DIAGNOSIS — F02.818 DEMENTIA, ALZHEIMER'S, WITH BEHAVIOR DISTURBANCE: ICD-10-CM

## 2025-07-15 ENCOUNTER — TELEPHONE (OUTPATIENT)
Dept: NEUROLOGY | Facility: CLINIC | Age: 75
End: 2025-07-15
Payer: MEDICARE

## 2025-07-15 NOTE — TELEPHONE ENCOUNTER
Attempted to contact Winter from [facility]. I left a detailed message to give our office a call back regarding needing information for patient.

## 2025-07-29 ENCOUNTER — PATIENT MESSAGE (OUTPATIENT)
Dept: INTERNAL MEDICINE | Facility: CLINIC | Age: 75
End: 2025-07-29
Payer: MEDICARE

## 2025-07-30 DIAGNOSIS — R41.3 MEMORY LOSS: ICD-10-CM

## 2025-07-30 DIAGNOSIS — F02.811 MAJOR NEUROCOGNITIVE DISORDER DUE TO ANOTHER MEDICAL CONDITION, WITH AGITATION: ICD-10-CM

## 2025-07-30 DIAGNOSIS — J20.9 ACUTE BRONCHITIS, UNSPECIFIED ORGANISM: ICD-10-CM

## 2025-07-30 DIAGNOSIS — F41.9 ANXIETY: ICD-10-CM

## 2025-07-30 DIAGNOSIS — F32.2 MAJOR DEPRESSIVE DISORDER, SINGLE EPISODE, SEVERE WITHOUT PSYCHOTIC FEATURES: ICD-10-CM

## 2025-07-30 DIAGNOSIS — G31.84 MCI (MILD COGNITIVE IMPAIRMENT) WITH MEMORY LOSS: ICD-10-CM

## 2025-07-31 RX ORDER — BUSPIRONE HYDROCHLORIDE 10 MG/1
10 TABLET ORAL 2 TIMES DAILY
Qty: 180 TABLET | Refills: 3 | Status: SHIPPED | OUTPATIENT
Start: 2025-07-31 | End: 2026-07-31

## 2025-07-31 RX ORDER — ALBUTEROL SULFATE 90 UG/1
2 INHALANT RESPIRATORY (INHALATION) EVERY 6 HOURS PRN
Qty: 54 G | Refills: 1 | Status: SHIPPED | OUTPATIENT
Start: 2025-07-31 | End: 2025-10-29

## 2025-07-31 RX ORDER — AMLODIPINE BESYLATE 10 MG/1
10 TABLET ORAL DAILY
Qty: 90 TABLET | Refills: 1 | Status: SHIPPED | OUTPATIENT
Start: 2025-07-31

## 2025-07-31 NOTE — TELEPHONE ENCOUNTER
No care due was identified.  [facility] Embedded Care Due Messages. Reference number: [id number].   7/31/2025 10:03:36 AM CDT

## 2025-07-31 NOTE — TELEPHONE ENCOUNTER
No care due was identified.  [facility] Embedded Care Due Messages. Reference number: [id number].   7/30/2025 7:32:02 PM CDT

## 2025-07-31 NOTE — TELEPHONE ENCOUNTER
Refill Decision Note   Rosa Isela Pate  is requesting a refill authorization.  Brief Assessment and Rationale for Refill:  Approve     Medication Therapy Plan:         Comments:     Note composed:10:04 AM 07/31/2025

## 2025-08-01 NOTE — TELEPHONE ENCOUNTER
Refill Decision Note   Rosa Isela Pate  is requesting a refill authorization.  Brief Assessment and Rationale for Refill:  Approve     Medication Therapy Plan:         Comments:     Note composed:10:14 PM 07/31/2025             Appointments     Last Visit   11/14/2024 Sherrell Drummond MD   Next Visit   Visit date not found Sherrell Drummond MD

## 2025-08-27 ENCOUNTER — EXTERNAL HOME HEALTH (OUTPATIENT)
Dept: HOME HEALTH SERVICES | Facility: HOSPITAL | Age: 75
End: 2025-08-27
Payer: MEDICARE

## 2025-08-27 ENCOUNTER — DOCUMENT SCAN (OUTPATIENT)
Dept: HOME HEALTH SERVICES | Facility: HOSPITAL | Age: 75
End: 2025-08-27
Payer: MEDICARE